# Patient Record
Sex: FEMALE | Race: WHITE | NOT HISPANIC OR LATINO | URBAN - NONMETROPOLITAN AREA
[De-identification: names, ages, dates, MRNs, and addresses within clinical notes are randomized per-mention and may not be internally consistent; named-entity substitution may affect disease eponyms.]

---

## 2017-03-31 ENCOUNTER — APPOINTMENT (OUTPATIENT)
Age: 67
Setting detail: DERMATOLOGY
End: 2017-03-31

## 2017-03-31 DIAGNOSIS — L57.8 OTHER SKIN CHANGES DUE TO CHRONIC EXPOSURE TO NONIONIZING RADIATION: ICD-10-CM

## 2017-03-31 DIAGNOSIS — L40.0 PSORIASIS VULGARIS: ICD-10-CM

## 2017-03-31 DIAGNOSIS — D485 NEOPLASM OF UNCERTAIN BEHAVIOR OF SKIN: ICD-10-CM

## 2017-03-31 PROBLEM — D48.5 NEOPLASM OF UNCERTAIN BEHAVIOR OF SKIN: Status: ACTIVE | Noted: 2017-03-31

## 2017-03-31 PROCEDURE — OTHER TREATMENT REGIMEN: OTHER

## 2017-03-31 PROCEDURE — 11101: CPT

## 2017-03-31 PROCEDURE — 99214 OFFICE O/P EST MOD 30 MIN: CPT | Mod: 25

## 2017-03-31 PROCEDURE — OTHER COUNSELING: OTHER

## 2017-03-31 PROCEDURE — OTHER BIOPSY BY SHAVE METHOD: OTHER

## 2017-03-31 PROCEDURE — OTHER MIPS QUALITY: OTHER

## 2017-03-31 PROCEDURE — 11100: CPT

## 2017-03-31 PROCEDURE — OTHER DEFER: OTHER

## 2017-03-31 ASSESSMENT — LOCATION DETAILED DESCRIPTION DERM
LOCATION DETAILED: RIGHT UPPER CUTANEOUS LIP
LOCATION DETAILED: LEFT MEDIAL DISTAL PRETIBIAL REGION
LOCATION DETAILED: RIGHT CENTRAL FRONTAL SCALP
LOCATION DETAILED: RIGHT MEDIAL TRAPEZIAL NECK
LOCATION DETAILED: UPPER STERNUM
LOCATION DETAILED: LEFT DISTAL POSTERIOR UPPER ARM
LOCATION DETAILED: RIGHT KNEE

## 2017-03-31 ASSESSMENT — LOCATION ZONE DERM
LOCATION ZONE: ARM
LOCATION ZONE: LEG
LOCATION ZONE: TRUNK
LOCATION ZONE: SCALP
LOCATION ZONE: LIP
LOCATION ZONE: NECK

## 2017-03-31 ASSESSMENT — LOCATION SIMPLE DESCRIPTION DERM
LOCATION SIMPLE: LEFT PRETIBIAL REGION
LOCATION SIMPLE: RIGHT KNEE
LOCATION SIMPLE: LEFT POSTERIOR UPPER ARM
LOCATION SIMPLE: CHEST
LOCATION SIMPLE: POSTERIOR NECK
LOCATION SIMPLE: RIGHT SCALP
LOCATION SIMPLE: RIGHT LIP

## 2017-03-31 NOTE — PROCEDURE: MIPS QUALITY
Quality 111:Pneumonia Vaccination Status For Older Adults: Pneumococcal Vaccination not Administered or Previously Received, Reason not Otherwise Specified
Detail Level: Detailed
Quality 110: Preventive Care And Screening: Influenza Immunization: Influenza Immunization Ordered or Recommended, but not Administered
Quality 47: Advance Care Plan: Advance Care Planning discussed and documented; advance care plan or surrogate decision maker documented in the medical record.

## 2017-03-31 NOTE — PROCEDURE: TREATMENT REGIMEN
Action 1: Continue
Initiate Regimen: Kit-psor 1 and Kit- eczema from [facility]
Detail Level: Zone

## 2017-03-31 NOTE — PROCEDURE: BIOPSY BY SHAVE METHOD
Cryotherapy Text: The wound bed was treated with cryotherapy after the biopsy was performed.
Wound Care: Bacitracin
Post-Care Instructions: I reviewed with the patient in detail post-care instructions. Patient is to keep the biopsy site dry overnight, and then apply bacitracin twice daily until healed. Patient may apply hydrogen peroxide soaks to remove any crusting.
Silver Nitrate Text: The wound bed was treated with silver nitrate after the biopsy was performed.
Electrodesiccation Text: The wound bed was treated with electrodesiccation after the biopsy was performed.
Anesthesia Volume In Cc: 0.5
Consent: Written consent was obtained and risks were reviewed including but not limited to scarring, infection, bleeding, scabbing, incomplete removal, nerve damage and allergy to anesthesia.
Hemostasis: Drysol
Anesthesia Type: 1% lidocaine with epinephrine
Additional Anesthesia Volume In Cc (Will Not Render If 0): 0
Billing Type: Third-Party Bill
Biopsy Method: Double edge Personna blades
Bill For Surgical Tray: no
Dressing: bandage
Electrodesiccation And Curettage Text: The wound bed was treated with electrodesiccation and curettage after the biopsy was performed.
Detail Level: Detailed
Type Of Destruction Used: Curettage
Notification Instructions: Patient will be notified of biopsy results. However, patient instructed to call the office if not contacted within 2 weeks.
Biopsy Type: H and E
Curettage Text: The wound bed was treated with curettage after the biopsy was performed.

## 2017-04-28 ENCOUNTER — APPOINTMENT (OUTPATIENT)
Age: 67
Setting detail: DERMATOLOGY
End: 2017-04-28

## 2017-04-28 DIAGNOSIS — L57.0 ACTINIC KERATOSIS: ICD-10-CM

## 2017-04-28 DIAGNOSIS — D485 NEOPLASM OF UNCERTAIN BEHAVIOR OF SKIN: ICD-10-CM

## 2017-04-28 DIAGNOSIS — L57.8 OTHER SKIN CHANGES DUE TO CHRONIC EXPOSURE TO NONIONIZING RADIATION: ICD-10-CM

## 2017-04-28 PROBLEM — E78.5 HYPERLIPIDEMIA, UNSPECIFIED: Status: ACTIVE | Noted: 2017-04-28

## 2017-04-28 PROBLEM — D48.5 NEOPLASM OF UNCERTAIN BEHAVIOR OF SKIN: Status: ACTIVE | Noted: 2017-04-28

## 2017-04-28 PROCEDURE — 99213 OFFICE O/P EST LOW 20 MIN: CPT | Mod: 25

## 2017-04-28 PROCEDURE — 17000 DESTRUCT PREMALG LESION: CPT

## 2017-04-28 PROCEDURE — OTHER RECOMMENDATIONS: OTHER

## 2017-04-28 PROCEDURE — OTHER LIQUID NITROGEN: OTHER

## 2017-04-28 PROCEDURE — OTHER FOLLOW UP FOR NEXT VISIT: OTHER

## 2017-04-28 PROCEDURE — OTHER BIOPSY BY SHAVE METHOD: OTHER

## 2017-04-28 PROCEDURE — 11100: CPT | Mod: 59

## 2017-04-28 PROCEDURE — OTHER COUNSELING: OTHER

## 2017-04-28 PROCEDURE — 17003 DESTRUCT PREMALG LES 2-14: CPT

## 2017-04-28 ASSESSMENT — LOCATION SIMPLE DESCRIPTION DERM
LOCATION SIMPLE: CHEST
LOCATION SIMPLE: LEFT PRETIBIAL REGION
LOCATION SIMPLE: RIGHT LIP

## 2017-04-28 ASSESSMENT — LOCATION DETAILED DESCRIPTION DERM
LOCATION DETAILED: UPPER STERNUM
LOCATION DETAILED: RIGHT UPPER CUTANEOUS LIP
LOCATION DETAILED: LEFT DISTAL PRETIBIAL REGION

## 2017-04-28 ASSESSMENT — LOCATION ZONE DERM
LOCATION ZONE: TRUNK
LOCATION ZONE: LIP
LOCATION ZONE: LEG

## 2017-04-28 NOTE — PROCEDURE: LIQUID NITROGEN
Render Post-Care Instructions In Note?: no
Detail Level: Simple
Post-Care Instructions: I reviewed with the patient in detail post-care instructions. Patient is to wear sunprotection, and avoid picking at any of the treated lesions. Pt may apply Vaseline to crusted or scabbing areas.
Consent: The patient's consent was obtained including but not limited to risks of crusting, scabbing, blistering, scarring, darker or lighter pigmentary change, recurrence, incomplete removal and infection.
Number Of Freeze-Thaw Cycles: 2 freeze-thaw cycles
Duration Of Freeze Thaw-Cycle (Seconds): 2

## 2017-04-28 NOTE — PROCEDURE: RECOMMENDATIONS
Detail Level: Simple
Recommendation Preamble: The following recommendations were made during the visit:

## 2017-04-28 NOTE — PROCEDURE: BIOPSY BY SHAVE METHOD
Additional Anesthesia Volume In Cc (Will Not Render If 0): 0
Dressing: bandage
Detail Level: Detailed
Electrodesiccation Text: The wound bed was treated with electrodesiccation after the biopsy was performed.
Curettage Text: The wound bed was treated with curettage after the biopsy was performed.
Silver Nitrate Text: The wound bed was treated with silver nitrate after the biopsy was performed.
Type Of Destruction Used: Curettage
Hemostasis: Drysol
Destruction After The Procedure: No
Notification Instructions: Patient will be notified of biopsy results. However, patient instructed to call the office if not contacted within 2 weeks.
Consent: Written consent was obtained and risks were reviewed including but not limited to scarring, infection, bleeding, scabbing, incomplete removal, nerve damage and allergy to anesthesia.
Biopsy Method: Double edge Personna blades
Post-Care Instructions: I reviewed with the patient in detail post-care instructions. Patient is to keep the biopsy site dry overnight, and then apply bacitracin twice daily until healed. Patient may apply hydrogen peroxide soaks to remove any crusting.
Anesthesia Volume In Cc: 0.5
Anesthesia Type: 1% lidocaine with epinephrine
Electrodesiccation And Curettage Text: The wound bed was treated with electrodesiccation and curettage after the biopsy was performed.
Biopsy Type: H and E
Wound Care: Bacitracin
Billing Type: Third-Party Bill
Cryotherapy Text: The wound bed was treated with cryotherapy after the biopsy was performed.

## 2017-05-31 ENCOUNTER — APPOINTMENT (OUTPATIENT)
Age: 67
Setting detail: DERMATOLOGY
End: 2017-06-01

## 2017-05-31 PROBLEM — C44.92 SQUAMOUS CELL CARCINOMA OF SKIN, UNSPECIFIED: Status: ACTIVE | Noted: 2017-05-31

## 2017-05-31 PROBLEM — C44.729 SQUAMOUS CELL CARCINOMA OF SKIN OF LEFT LOWER LIMB, INCLUDING HIP: Status: ACTIVE | Noted: 2017-05-31

## 2017-05-31 PROCEDURE — OTHER SUPERFICIAL RADIATION TREATMENT: OTHER

## 2017-05-31 PROCEDURE — OTHER TREATMENT REGIMEN: OTHER

## 2017-05-31 PROCEDURE — 77334 RADIATION TREATMENT AID(S): CPT

## 2017-05-31 PROCEDURE — 77280 THER RAD SIMULAJ FIELD SMPL: CPT

## 2017-05-31 PROCEDURE — 99211 OFF/OP EST MAY X REQ PHY/QHP: CPT | Mod: 25

## 2017-05-31 PROCEDURE — OTHER COUNSELING: OTHER

## 2017-05-31 PROCEDURE — OTHER FOLLOW UP FOR NEXT VISIT: OTHER

## 2017-05-31 PROCEDURE — 77300 RADIATION THERAPY DOSE PLAN: CPT

## 2017-05-31 PROCEDURE — 77261 THER RADIOLOGY TX PLNG SMPL: CPT

## 2017-05-31 NOTE — PROCEDURE: SUPERFICIAL RADIATION TREATMENT
Include Rx 4 When Rendering Additional Prescriptions: No
Total Dose (Optional-Please Include Units): 5062.2 cGy
Simple Simulation Afterword Text Will Be Included With Simple Simulations (Indications............): The patient had a complete consultation regarding all applicable modalities for the treatment of their skin cancer and based on a variety of factors including the type of tumor, size, and location, the relevant medical history as well as local tissue factors, the functional status of the individual, the ability to perform necessary postoperative wound instructions and the need for simultaneous treatments as well as overall wound healing status, it was determined that the patient would begin radiation therapy treatment for skin cancer.  A full simulation and treatment device design was performed including the determination and formulation of appropriate simple and complex devices including lead shield of 0.762 mm thickness to form molded customized shielding to specifically correlate with the lesion size including treatment margin.  The custom lead shield is adequate to accommodate the appropriate applicator and provide adequate shielding around the treatment site.  The specific field applicator, shields, and devices both simple and complex as well as the specific patient setup is outlined below.  The patient was given a full consent for superficial radiation to both verbally and in writing and the full determination of patient's eligibility for treatment and selection is outlined on the patient eligibility and treatment selection form.  The specific superficial radiotherapy prescription was determined and was documented on the superficial radiotherapy prescription form.  A treatment calculation was also performed and documented on the treatment calculation form.  Based on the prescription, the patient was scheduled for a series of fractional treatments.
Port Dimensions-X Axis In Cm: 2
Field Size (Applicator): 2.5 cm
Total Number Of Fractions Rx 3: 15
Fractionation Number (Evaluation): 5
Field Size (Applicator) Rx 2: 4.0 cm
Treatment Margins In Cm: 0.5
Custom Shielding Preamble Text Will Not Be Included With Simple Simulations (.......... X X Y Cm): A lead shield of 0.762 mm thickness is utilized to form a molded, custom shield with a
Field Number: 1
Additional Prescription Justification Text: If there is any interruption in treatment exceeding 5 days please see Decay and Dose Adjustment Calculation and complete treatment under Prescription 2.
Simple Simulation Preamble Text Will Be Included With Simple Simulations (.......... Indications): Simple simulation was performed today for the following reasons:
Energy (Include Units): 50kV
Fractions / Week: 3
Dose / Tx In Cgy (Optional): 383.5
Treatment Time / Fractionation (Optional- Include Units): 0.33 min
Assessment: Appropriate reaction
Total Number Of Fractions: 20
Fractionation Number: 0
Daily Fractionated Dose (Optional- Include Units): 253.11 cGy
Treatment Time In Min (Optional): 0.50
Computed Treatment Time In Min (Will Render The Same As Calculated Treatment Time If Left Blank): 0.33
Treatment Device Design After Initial Simulation Justification (Will Render If Bill For Treatment Devices = Yes): The patient is status post radiation simulation and is evaluated as to the use of additional devices for shielding and placement for radiation therapy.
Detail Level: Detailed
Render Prescriptions In Note?: Yes
Custom Shielding Afterword Text Will Not Be Included With Simple Simulations (X X Y Cm............): port to correlate with the lesion size, including treatment margin. The custom lead shield is adequate to accommodate the appropriate applicator and provide adequate shielding around the treatment site. Additional shielding (as noted below) is used to protect sensitive, normal tissues.
Functional Status: 1 (ambulatory, light activity)
Time Dose Fractionation (Optional- Include Units If Applicable): 86
Intro Statement (Will Not Render If Left Blank): The patient is undergoing superficial radiation therapy for skin cancer and presents for weekly evaluation and management.  Per protocol and as documented on the flow sheet, the patient was questioned as to subjective redness, pruritus, pain, drainage, fatigue, or any other symptoms.  Objectively, the radiation area was evaluated with regards to erythema, atrophy, scale, crusting, erosion, ulceration, edema, purpura, tenderness, warmth, drainage, and any other findings.  The plan was extensively reviewed including the dose, and dosing schedule.  The simulation and clinical setup was also reviewed as was the external and any internal shields and based on this review the appropriateness and sufficiency of treatment was determined.
Shielding Size (Optional- Include Units) Rx 2: 3.0 x 2.5 cm
Shielding Size (Optional- Include Units): 2.0 x 2.0 cm
Energy (Optional-Please Include Units) Rx 2: 50 kV
Dose Per Fractionation In Cgy (Optional): 253.11
Patient Positioning: Sitting

## 2017-05-31 NOTE — PROCEDURE: TREATMENT REGIMEN
Plan: Per the request of Blaire Camejo Sherry was seen today for Superficial Radiation Therapy requiring simulation (CPT® 62127) in preparation for treatment of specific diseased site(s). Simulation is necessary to determine correct patient and treatment portal positioning, deliver safe and effective radiation therapy.
Detail Level: Zone

## 2017-07-05 ENCOUNTER — APPOINTMENT (OUTPATIENT)
Age: 67
Setting detail: DERMATOLOGY
End: 2017-07-05

## 2017-07-05 PROBLEM — C44.729 SQUAMOUS CELL CARCINOMA OF SKIN OF LEFT LOWER LIMB, INCLUDING HIP: Status: ACTIVE | Noted: 2017-07-05

## 2017-07-05 PROCEDURE — 77280 THER RAD SIMULAJ FIELD SMPL: CPT

## 2017-07-05 PROCEDURE — OTHER SUPERFICIAL RADIATION TREATMENT: OTHER

## 2017-07-05 PROCEDURE — OTHER FOLLOW UP FOR NEXT VISIT: OTHER

## 2017-07-05 PROCEDURE — OTHER TREATMENT REGIMEN: OTHER

## 2017-07-05 PROCEDURE — G6001 ECHO GUIDANCE RADIOTHERAPY: HCPCS

## 2017-07-05 PROCEDURE — 77401 RADIATION TX DELIVERY SUPFC: CPT

## 2017-07-05 NOTE — PROCEDURE: TREATMENT REGIMEN
Detail Level: Zone
Plan: Per the request of Blaire Camejo Irais was seen today for Superficial Radiation Therapy requiring simulation (CPT® 63664) in preparation for treatment of specific diseased site(s). Simulation is necessary to determine correct patient and treatment portal positioning, deliver safe and effective radiation therapy. A high frequency ultrasound image was acquired prior to treatment today for three dimensional evaluation of tumor volume and response to treatment, in addition, geometric accuracy of field placement (CPT® ). Physician evaluation of the ultrasound tumor depth will be ongoing through course of treatment, and is deemed medically necessary ensuring efficacy of treatment. Today’s image and setup was evaluated determining continuation of treatment with the current plan, or necessary changes as appropriate. All appropriate custom blocking and treatment parameters verified by radiation therapist according to initial simulation.\\n\\nUS image guidance and field placement prior to treatment delivery performed. US depth= 0.7 mm\\n\\nPer Dr. Reid, continued daily US guidance and simulation is required for field placement, measurement of tumor depth, progress and edema monitoring

## 2017-07-05 NOTE — PROCEDURE: SUPERFICIAL RADIATION TREATMENT
Total Number Of Fractions Rx 4: 15
Vinh For Simulation Without Treatment Device Design (Simple Simulation): No
Field Size (Applicator) Rx 2: 4.0 cm
Fractionation Number (Evaluation): 5
Total Number Of Fractions: 20
Additional Prescription Justification Text: If there is any interruption in treatment exceeding 5 days please see Decay and Dose Adjustment Calculation and complete treatment under Prescription 2.
Dose Per Fractionation In Cgy (Optional): 253.11
Functional Status: 1 (ambulatory, light activity)
Prescription Used: 1
Treatment Device Design After Initial Simulation Justification (Will Render If Bill For Treatment Devices = Yes): The patient is status post radiation simulation and is evaluated as to the use of additional devices for shielding and placement for radiation therapy.
Shielding Size (Optional- Include Units) Rx 2: 3.0 x 2.5 cm
Energy (Include Units): 50kV
Detail Level: Detailed
Total Dose (Optional-Please Include Units): 5062.2 cGy
Initial Radiation Treatment Planning (Will Render If Bill Simulation = Yes): The patient had a complete consultation regarding all applicable modalities for the treatment of their skin cancer and based on a variety of factors including the type of tumor, size, and location, the relevant medical history as well as local tissue factors, the functional status of the individual, the ability to perform necessary postoperative wound instructions and the need for simultaneous treatments as well as overall wound healing status, it was determined that the patient would begin radiation therapy treatment for skin cancer.  A full simulation and treatment device design was performed including the determination and formulation of appropriate simple and complex devices including lead shield of 0.762 mm thickness to form molded customized shielding to specifically correlate with the lesion size including treatment margin.  The custom lead shield is adequate to accommodate the appropriate applicator and provide adequate shielding around the treatment site.  The specific field applicator, shields, and devices both simple and complex as well as the specific patient setup is outlined below.  The patient was given a full consent for superficial radiation to both verbally and in writing and the full determination of patient's eligibility for treatment and selection is outlined on the patient eligibility and treatment selection form.  The specific superficial radiotherapy prescription was determined and was documented on the superficial radiotherapy prescription form.  A treatment calculation was also performed and documented on the treatment calculation form.  Based on the prescription, the patient was scheduled for a series of fractional treatments.
Computed Treatment Time In Min (Will Render The Same As Calculated Treatment Time If Left Blank): 0.33
Field Size (Applicator): 2.5 cm
Fractionation Number: 0
Treatment Time / Fractionation (Optional- Include Units): 0.33 min
Energy (Optional-Please Include Units) Rx 2: 50 kV
Port Dimensions-Y Axis In Cm: 2
Patient Positioning: Sitting
Intro Statement (Will Not Render If Left Blank): The patient is undergoing superficial radiation therapy for skin cancer and presents for weekly evaluation and management.  Per protocol and as documented on the flow sheet, the patient was questioned as to subjective redness, pruritus, pain, drainage, fatigue, or any other symptoms.  Objectively, the radiation area was evaluated with regards to erythema, atrophy, scale, crusting, erosion, ulceration, edema, purpura, tenderness, warmth, drainage, and any other findings.  The plan was extensively reviewed including the dose, and dosing schedule.  The simulation and clinical setup was also reviewed as was the external and any internal shields and based on this review the appropriateness and sufficiency of treatment was determined.
Custom Shielding Afterword Text Will Not Be Included With Simple Simulations (X X Y Cm............): port to correlate with the lesion size, including treatment margin. The custom lead shield is adequate to accommodate the appropriate applicator and provide adequate shielding around the treatment site. Additional shielding (as noted below) is used to protect sensitive, normal tissues.
Assessment: Appropriate reaction
Day Of The Week Treatment Administered: Wednesday
Time Dose Fractionation (Optional- Include Units If Applicable): 86
Bill For Radiation Treatment: Yes
Treatment Margins In Cm: 0.5
Daily Fractionated Dose (Optional- Include Units): 253.11 cGy
Custom Shielding Preamble Text Will Not Be Included With Simple Simulations (.......... X X Y Cm): A lead shield of 0.762 mm thickness is utilized to form a molded, custom shield with a
Fractions / Week Rx 2: 3
Shielding Size (Optional- Include Units): 2.0 x 2.0 cm
Treatment Time In Min (Optional): 0.50
Simple Simulation Preamble Text Will Be Included With Simple Simulations (.......... Indications): Simple simulation was performed today for the following reasons:
Dose / Tx In Cgy (Optional): 383.5

## 2017-07-07 ENCOUNTER — APPOINTMENT (OUTPATIENT)
Age: 67
Setting detail: DERMATOLOGY
End: 2017-07-10

## 2017-07-07 PROBLEM — E78.5 HYPERLIPIDEMIA, UNSPECIFIED: Status: ACTIVE | Noted: 2017-07-07

## 2017-07-07 PROBLEM — C44.729 SQUAMOUS CELL CARCINOMA OF SKIN OF LEFT LOWER LIMB, INCLUDING HIP: Status: ACTIVE | Noted: 2017-07-07

## 2017-07-07 PROCEDURE — OTHER TREATMENT REGIMEN: OTHER

## 2017-07-07 PROCEDURE — OTHER SUPERFICIAL RADIATION TREATMENT: OTHER

## 2017-07-07 PROCEDURE — 77280 THER RAD SIMULAJ FIELD SMPL: CPT

## 2017-07-07 PROCEDURE — 77401 RADIATION TX DELIVERY SUPFC: CPT

## 2017-07-07 PROCEDURE — G6001 ECHO GUIDANCE RADIOTHERAPY: HCPCS

## 2017-07-07 PROCEDURE — OTHER FOLLOW UP FOR NEXT VISIT: OTHER

## 2017-07-07 NOTE — PROCEDURE: TREATMENT REGIMEN
Plan: Per the request of Blaire Camejo Sherry was seen today for Superficial Radiation Therapy requiring simulation (CPT® 66760) in preparation for treatment of specific diseased site(s). Simulation is necessary to determine correct patient and treatment portal positioning, deliver safe and effective radiation therapy. A high frequency ultrasound image was acquired prior to treatment today for three dimensional evaluation of tumor volume and response to treatment, in addition, geometric accuracy of field placement (CPT® ). Physician evaluation of the ultrasound tumor depth will be ongoing through course of treatment, and is deemed medically necessary ensuring efficacy of treatment. Today’s image and setup was evaluated determining continuation of treatment with the current plan, or necessary changes as appropriate. All appropriate custom blocking and treatment parameters verified by radiation therapist according to initial simulation.\\n\\nUS image guidance and field placement prior to treatment delivery performed. US depth= 0.54\\n\\nPer Dr. Reid, continued daily US guidance and simulation is required for field placement, measurement of tumor depth, progress and edema monitoring
Detail Level: Zone

## 2017-07-07 NOTE — PROCEDURE: SUPERFICIAL RADIATION TREATMENT
Intro Statement (Will Not Render If Left Blank): The patient is undergoing superficial radiation therapy for skin cancer and presents for weekly evaluation and management.  Per protocol and as documented on the flow sheet, the patient was questioned as to subjective redness, pruritus, pain, drainage, fatigue, or any other symptoms.  Objectively, the radiation area was evaluated with regards to erythema, atrophy, scale, crusting, erosion, ulceration, edema, purpura, tenderness, warmth, drainage, and any other findings.  The plan was extensively reviewed including the dose, and dosing schedule.  The simulation and clinical setup was also reviewed as was the external and any internal shields and based on this review the appropriateness and sufficiency of treatment was determined.
Functional Status: 1 (ambulatory, light activity)
Render Patient Eligibility And Selection In Note?: No
Bill For Radiation Treatment: Yes
Fractions / Week: 3
Detail Level: Detailed
Fractionation Number (Evaluation): 5
Cumulative Dose In Cgy (Optional): 506.22
Shielding Size (Optional- Include Units): 2.0 x 2.0 cm
Treatment Device Design After Initial Simulation Justification (Will Render If Bill For Treatment Devices = Yes): The patient is status post radiation simulation and is evaluated as to the use of additional devices for shielding and placement for radiation therapy.
Custom Shielding Afterword Text Will Not Be Included With Simple Simulations (X X Y Cm............): port to correlate with the lesion size, including treatment margin. The custom lead shield is adequate to accommodate the appropriate applicator and provide adequate shielding around the treatment site. Additional shielding (as noted below) is used to protect sensitive, normal tissues.
Field Size (Applicator): 2.5 cm
Port Dimensions-X Axis In Cm: 2
Additional Prescription Justification Text: If there is any interruption in treatment exceeding 5 days please see Decay and Dose Adjustment Calculation and complete treatment under Prescription 2.
Dimensions-X Axis In Cm: 1
Total Dose (Optional-Please Include Units): 5062.2 cGy
Dose Per Fractionation In Cgy (Optional): 253.11
Shielding Size (Optional- Include Units) Rx 2: 3.0 x 2.5 cm
Custom Shielding Preamble Text Will Not Be Included With Simple Simulations (.......... X X Y Cm): A lead shield of 0.762 mm thickness is utilized to form a molded, custom shield with a
Assessment: Appropriate reaction
Time Dose Fractionation (Optional- Include Units If Applicable): 86
Energy (Include Units): 50kV
Patient Positioning: Sitting
Total Number Of Fractions Rx 3: 15
Dose / Tx In Cgy (Optional): 383.5
Simple Simulation Preamble Text Will Be Included With Simple Simulations (.......... Indications): Simple simulation was performed today for the following reasons:
Treatment Time In Min (Optional): 0.50
Field Size (Applicator) Rx 2: 4.0 cm
Energy (Optional-Please Include Units) Rx 2: 50 kV
Total Number Of Fractions: 20
Daily Fractionated Dose (Optional- Include Units): 253.11 cGy
Initial Radiation Treatment Planning (Will Render If Bill Simulation = Yes): The patient had a complete consultation regarding all applicable modalities for the treatment of their skin cancer and based on a variety of factors including the type of tumor, size, and location, the relevant medical history as well as local tissue factors, the functional status of the individual, the ability to perform necessary postoperative wound instructions and the need for simultaneous treatments as well as overall wound healing status, it was determined that the patient would begin radiation therapy treatment for skin cancer.  A full simulation and treatment device design was performed including the determination and formulation of appropriate simple and complex devices including lead shield of 0.762 mm thickness to form molded customized shielding to specifically correlate with the lesion size including treatment margin.  The custom lead shield is adequate to accommodate the appropriate applicator and provide adequate shielding around the treatment site.  The specific field applicator, shields, and devices both simple and complex as well as the specific patient setup is outlined below.  The patient was given a full consent for superficial radiation to both verbally and in writing and the full determination of patient's eligibility for treatment and selection is outlined on the patient eligibility and treatment selection form.  The specific superficial radiotherapy prescription was determined and was documented on the superficial radiotherapy prescription form.  A treatment calculation was also performed and documented on the treatment calculation form.  Based on the prescription, the patient was scheduled for a series of fractional treatments.
Computed Treatment Time In Min (Will Render The Same As Calculated Treatment Time If Left Blank): 0.33
Treatment Time / Fractionation (Optional- Include Units): 0.33 min
Treatment Margins In Cm: 0.5
Day Of The Week Treatment Administered: Friday
Fractionation Number: 0

## 2017-07-10 ENCOUNTER — APPOINTMENT (OUTPATIENT)
Age: 67
Setting detail: DERMATOLOGY
End: 2017-07-10

## 2017-07-10 PROBLEM — C44.729 SQUAMOUS CELL CARCINOMA OF SKIN OF LEFT LOWER LIMB, INCLUDING HIP: Status: ACTIVE | Noted: 2017-07-10

## 2017-07-10 PROCEDURE — 77280 THER RAD SIMULAJ FIELD SMPL: CPT

## 2017-07-10 PROCEDURE — OTHER SUPERFICIAL RADIATION TREATMENT: OTHER

## 2017-07-10 PROCEDURE — OTHER TREATMENT REGIMEN: OTHER

## 2017-07-10 PROCEDURE — 77401 RADIATION TX DELIVERY SUPFC: CPT

## 2017-07-10 PROCEDURE — OTHER FOLLOW UP FOR NEXT VISIT: OTHER

## 2017-07-10 PROCEDURE — G6001 ECHO GUIDANCE RADIOTHERAPY: HCPCS

## 2017-07-10 NOTE — PROCEDURE: TREATMENT REGIMEN
Detail Level: Zone
Plan: Per the request of Dr. Reid, Blaire Irais was seen today for Superficial Radiation Therapy requiring simulation (CPT® 45794) in preparation for treatment of specific diseased site(s). Simulation is necessary to determine correct patient and treatment portal positioning, deliver safe and effective radiation therapy. A high frequency ultrasound image was acquired prior to treatment today for three dimensional evaluation of tumor volume and response to treatment, in addition, geometric accuracy of field placement (CPT® ). Physician evaluation of the ultrasound tumor depth will be ongoing through course of treatment, and is deemed medically necessary ensuring efficacy of treatment. Today’s image and setup was evaluated determining continuation of treatment with the current plan, or necessary changes as appropriate. All appropriate custom blocking and treatment parameters verified by radiation therapist according to initial simulation.\\n\\nUS image guidance and field placement prior to treatment delivery performed. US depth is 0.67mm.\\n\\nPer Dr. Reid, continued daily US guidance and simulation is required for field placement, measurement of tumor depth, progress and edema monitoring

## 2017-07-10 NOTE — PROCEDURE: SUPERFICIAL RADIATION TREATMENT
Port Dimensions-Y Axis In Cm: 2
Computed Treatment Time In Min (Will Render The Same As Calculated Treatment Time If Left Blank): 0.33
Field Size (Applicator): 2.5 cm
Prescription Used: 1
Render Patient Eligibility And Selection In Note?: No
Day Of The Week Treatment Administered: Monday
Time Dose Fractionation (Optional- Include Units If Applicable): 86
Patient Positioning: Sitting
Total Number Of Fractions Rx 4: 15
Energy (Optional-Please Include Units): 50kV
Custom Shielding Afterword Text Will Not Be Included With Simple Simulations (X X Y Cm............): port to correlate with the lesion size, including treatment margin. The custom lead shield is adequate to accommodate the appropriate applicator and provide adequate shielding around the treatment site. Additional shielding (as noted below) is used to protect sensitive, normal tissues.
Field Size (Applicator) Rx 2: 4.0 cm
Simple Simulation Preamble Text Will Be Included With Simple Simulations (.......... Indications): Simple simulation was performed today for the following reasons:
Shielding Size (Optional- Include Units): 2.0 x 2.0 cm
Fractionation Number (Evaluation): 5
Energy (Optional-Please Include Units) Rx 2: 50 kV
Treatment Time In Min (Optional): 0.50
Custom Shielding Preamble Text Will Not Be Included With Simple Simulations (.......... X X Y Cm): A lead shield of 0.762 mm thickness is utilized to form a molded, custom shield with a
Intro Statement (Will Not Render If Left Blank): The patient is undergoing superficial radiation therapy for skin cancer and presents for weekly evaluation and management.  Per protocol and as documented on the flow sheet, the patient was questioned as to subjective redness, pruritus, pain, drainage, fatigue, or any other symptoms.  Objectively, the radiation area was evaluated with regards to erythema, atrophy, scale, crusting, erosion, ulceration, edema, purpura, tenderness, warmth, drainage, and any other findings.  The plan was extensively reviewed including the dose, and dosing schedule.  The simulation and clinical setup was also reviewed as was the external and any internal shields and based on this review the appropriateness and sufficiency of treatment was determined.
Treatment Device Design After Initial Simulation Justification (Will Render If Bill For Treatment Devices = Yes): The patient is status post radiation simulation and is evaluated as to the use of additional devices for shielding and placement for radiation therapy.
Total Number Of Fractions: 20
Total Dose (Optional-Please Include Units): 5062.2 cGy
Initial Radiation Treatment Planning (Will Render If Bill Simulation = Yes): The patient had a complete consultation regarding all applicable modalities for the treatment of their skin cancer and based on a variety of factors including the type of tumor, size, and location, the relevant medical history as well as local tissue factors, the functional status of the individual, the ability to perform necessary postoperative wound instructions and the need for simultaneous treatments as well as overall wound healing status, it was determined that the patient would begin radiation therapy treatment for skin cancer.  A full simulation and treatment device design was performed including the determination and formulation of appropriate simple and complex devices including lead shield of 0.762 mm thickness to form molded customized shielding to specifically correlate with the lesion size including treatment margin.  The custom lead shield is adequate to accommodate the appropriate applicator and provide adequate shielding around the treatment site.  The specific field applicator, shields, and devices both simple and complex as well as the specific patient setup is outlined below.  The patient was given a full consent for superficial radiation to both verbally and in writing and the full determination of patient's eligibility for treatment and selection is outlined on the patient eligibility and treatment selection form.  The specific superficial radiotherapy prescription was determined and was documented on the superficial radiotherapy prescription form.  A treatment calculation was also performed and documented on the treatment calculation form.  Based on the prescription, the patient was scheduled for a series of fractional treatments.
Fractions / Week: 3
Treatment Margins In Cm: 0.5
Detail Level: Detailed
Functional Status: 1 (ambulatory, light activity)
Bill For Radiation Treatment: Yes
Daily Fractionated Dose (Optional- Include Units): 253.11 cGy
Assessment: Appropriate reaction
Dose Per Fractionation In Cgy (Optional): 253.11
Cumulative Dose In Cgy (Optional): 759.33
Shielding Size (Optional- Include Units) Rx 2: 3.0 x 2.5 cm
Additional Prescription Justification Text: If there is any interruption in treatment exceeding 5 days please see Decay and Dose Adjustment Calculation and complete treatment under Prescription 2.
Treatment Time / Fractionation (Optional- Include Units): 0.33 min
Dose / Tx In Cgy (Optional): 383.5
Fractionation Number: 0

## 2017-07-12 ENCOUNTER — APPOINTMENT (OUTPATIENT)
Age: 67
Setting detail: DERMATOLOGY
End: 2017-07-12

## 2017-07-12 PROBLEM — C44.729 SQUAMOUS CELL CARCINOMA OF SKIN OF LEFT LOWER LIMB, INCLUDING HIP: Status: ACTIVE | Noted: 2017-07-12

## 2017-07-12 PROCEDURE — 77401 RADIATION TX DELIVERY SUPFC: CPT

## 2017-07-12 PROCEDURE — 77280 THER RAD SIMULAJ FIELD SMPL: CPT

## 2017-07-12 PROCEDURE — OTHER TREATMENT REGIMEN: OTHER

## 2017-07-12 PROCEDURE — OTHER SUPERFICIAL RADIATION TREATMENT: OTHER

## 2017-07-12 PROCEDURE — G6001 ECHO GUIDANCE RADIOTHERAPY: HCPCS

## 2017-07-12 PROCEDURE — OTHER FOLLOW UP FOR NEXT VISIT: OTHER

## 2017-07-12 NOTE — PROCEDURE: TREATMENT REGIMEN
Detail Level: Zone
Plan: Per the request of Dr. Reid, Blaire Irais was seen today for Superficial Radiation Therapy requiring simulation (CPT® 19310) in preparation for treatment of specific diseased site(s). Simulation is necessary to determine correct patient and treatment portal positioning, deliver safe and effective radiation therapy. A high frequency ultrasound image was acquired prior to treatment today for three dimensional evaluation of tumor volume and response to treatment, in addition, geometric accuracy of field placement (CPT® ). Physician evaluation of the ultrasound tumor depth will be ongoing through course of treatment, and is deemed medically necessary ensuring efficacy of treatment. Today’s image and setup was evaluated determining continuation of treatment with the current plan, or necessary changes as appropriate. All appropriate custom blocking and treatment parameters verified by radiation therapist according to initial simulation.\\n\\nUS image guidance and field placement prior to treatment delivery performed. US depth is 0.54mm.\\n\\nPer Dr. Reid, continued daily US guidance and simulation is required for field placement, measurement of tumor depth, progress and edema monitoring

## 2017-07-12 NOTE — PROCEDURE: SUPERFICIAL RADIATION TREATMENT
Patient Positioning: Sitting
Cumulative Dose In Cgy (Optional): 1012.44
Intro Statement (Will Not Render If Left Blank): The patient is undergoing superficial radiation therapy for skin cancer and presents for weekly evaluation and management.  Per protocol and as documented on the flow sheet, the patient was questioned as to subjective redness, pruritus, pain, drainage, fatigue, or any other symptoms.  Objectively, the radiation area was evaluated with regards to erythema, atrophy, scale, crusting, erosion, ulceration, edema, purpura, tenderness, warmth, drainage, and any other findings.  The plan was extensively reviewed including the dose, and dosing schedule.  The simulation and clinical setup was also reviewed as was the external and any internal shields and based on this review the appropriateness and sufficiency of treatment was determined.
Field Size (Applicator): 2.5 cm
Render Patient Eligibility And Selection In Note?: No
Field Number: 1
Treatment Device Design After Initial Simulation Justification (Will Render If Bill For Treatment Devices = Yes): The patient is status post radiation simulation and is evaluated as to the use of additional devices for shielding and placement for radiation therapy.
Custom Shielding Afterword Text Will Not Be Included With Simple Simulations (X X Y Cm............): port to correlate with the lesion size, including treatment margin. The custom lead shield is adequate to accommodate the appropriate applicator and provide adequate shielding around the treatment site. Additional shielding (as noted below) is used to protect sensitive, normal tissues.
Fractionation Number: 4
Bill For Radiation Treatment: Yes
Computed Treatment Time In Min (Will Render The Same As Calculated Treatment Time If Left Blank): 0.33
Total Number Of Fractions: 20
Custom Shielding Preamble Text Will Not Be Included With Simple Simulations (.......... X X Y Cm): A lead shield of 0.762 mm thickness is utilized to form a molded, custom shield with a
Field Size (Applicator) Rx 2: 4.0 cm
Treatment Time / Fractionation (Optional- Include Units): 0.33 min
Total Number Of Fractions Rx 3: 15
Assessment: Appropriate reaction
Dose Per Fractionation In Cgy (Optional): 253.11
Total Dose (Optional-Please Include Units): 5062.2 cGy
Fractions / Week Rx 3: 5
Port Dimensions-Y Axis In Cm: 2
Functional Status: 1 (ambulatory, light activity)
Shielding Size (Optional- Include Units) Rx 2: 3.0 x 2.5 cm
Fractionation Number: 0
Daily Fractionated Dose (Optional- Include Units): 253.11 cGy
Fractions / Week Rx 2: 3
Energy (Optional-Please Include Units): 50kV
Dose / Tx In Cgy (Optional): 383.5
Shielding Size (Optional- Include Units): 2.0 x 2.0 cm
Initial Radiation Treatment Planning (Will Render If Bill Simulation = Yes): The patient had a complete consultation regarding all applicable modalities for the treatment of their skin cancer and based on a variety of factors including the type of tumor, size, and location, the relevant medical history as well as local tissue factors, the functional status of the individual, the ability to perform necessary postoperative wound instructions and the need for simultaneous treatments as well as overall wound healing status, it was determined that the patient would begin radiation therapy treatment for skin cancer.  A full simulation and treatment device design was performed including the determination and formulation of appropriate simple and complex devices including lead shield of 0.762 mm thickness to form molded customized shielding to specifically correlate with the lesion size including treatment margin.  The custom lead shield is adequate to accommodate the appropriate applicator and provide adequate shielding around the treatment site.  The specific field applicator, shields, and devices both simple and complex as well as the specific patient setup is outlined below.  The patient was given a full consent for superficial radiation to both verbally and in writing and the full determination of patient's eligibility for treatment and selection is outlined on the patient eligibility and treatment selection form.  The specific superficial radiotherapy prescription was determined and was documented on the superficial radiotherapy prescription form.  A treatment calculation was also performed and documented on the treatment calculation form.  Based on the prescription, the patient was scheduled for a series of fractional treatments.
Detail Level: Detailed
Treatment Margins In Cm: 0.5
Simple Simulation Preamble Text Will Be Included With Simple Simulations (.......... Indications): Simple simulation was performed today for the following reasons:
Treatment Time In Min (Optional): 0.50
Time Dose Fractionation (Optional- Include Units If Applicable): 86
Energy (Optional-Please Include Units) Rx 2: 50 kV
Day Of The Week Treatment Administered: Wednesday
Additional Prescription Justification Text: If there is any interruption in treatment exceeding 5 days please see Decay and Dose Adjustment Calculation and complete treatment under Prescription 2.

## 2017-07-14 ENCOUNTER — APPOINTMENT (OUTPATIENT)
Age: 67
Setting detail: DERMATOLOGY
End: 2017-07-14

## 2017-07-14 PROBLEM — C44.729 SQUAMOUS CELL CARCINOMA OF SKIN OF LEFT LOWER LIMB, INCLUDING HIP: Status: ACTIVE | Noted: 2017-07-14

## 2017-07-14 PROCEDURE — OTHER TREATMENT REGIMEN: OTHER

## 2017-07-14 PROCEDURE — OTHER SUPERFICIAL RADIATION TREATMENT: OTHER

## 2017-07-14 PROCEDURE — G6001 ECHO GUIDANCE RADIOTHERAPY: HCPCS

## 2017-07-14 PROCEDURE — 77401 RADIATION TX DELIVERY SUPFC: CPT

## 2017-07-14 PROCEDURE — OTHER FOLLOW UP FOR NEXT VISIT: OTHER

## 2017-07-14 PROCEDURE — 77280 THER RAD SIMULAJ FIELD SMPL: CPT

## 2017-07-14 NOTE — PROCEDURE: TREATMENT REGIMEN
Plan: Per the request of Dr. Reid, Blaire Irais was seen today for Superficial Radiation Therapy requiring simulation (CPT® 01801) in preparation for treatment of specific diseased site(s). Simulation is necessary to determine correct patient and treatment portal positioning, deliver safe and effective radiation therapy. A high frequency ultrasound image was acquired prior to treatment today for three dimensional evaluation of tumor volume and response to treatment, in addition, geometric accuracy of field placement (CPT® ). Physician evaluation of the ultrasound tumor depth will be ongoing through course of treatment, and is deemed medically necessary ensuring efficacy of treatment. Today’s image and setup was evaluated determining continuation of treatment with the current plan, or necessary changes as appropriate. All appropriate custom blocking and treatment parameters verified by radiation therapist according to initial simulation.\\n\\nUS image guidance and field placement prior to treatment delivery performed. US depth is 0.54mm.\\n\\nPer Dr. Reid, continued daily US guidance and simulation is required for field placement, measurement of tumor depth, progress and edema monitoring
Detail Level: Zone
Plan: Per the request of Dr. Reid, BlaireIrais was seen today for Superficial Radiation Therapy requiring simulation (CPT® 16095) in preparation for treatment of specific diseased site(s). Simulation is necessary to determine correct patient and treatment portal positioning, deliver safe and effective radiation therapy. A high frequency ultrasound image was acquired prior to treatment today for three dimensional evaluation of tumor volume and response to treatment, in addition, geometric accuracy of field placement (CPT® ). Physician evaluation of the ultrasound tumor depth will be ongoing through course of treatment, and is deemed medically necessary ensuring efficacy of treatment. Today’s image and setup was evaluated determining continuation of treatment with the current plan, or necessary changes as appropriate. All appropriate custom blocking and treatment parameters verified by radiation therapist according to initial simulation.\\n\\nUS image guidance and field placement prior to treatment delivery performed. US depth is n/a due to poor image quality.\\n\\nPer Dr. Reid, continued daily US guidance and simulation is required for field placement, measurement of tumor depth, progress and edema monitoring

## 2017-07-14 NOTE — PROCEDURE: SUPERFICIAL RADIATION TREATMENT
Total Number Of Fractions: 20
Custom Shielding Afterword Text Will Not Be Included With Simple Simulations (X X Y Cm............): port to correlate with the lesion size, including treatment margin. The custom lead shield is adequate to accommodate the appropriate applicator and provide adequate shielding around the treatment site. Additional shielding (as noted below) is used to protect sensitive, normal tissues.
Energy (Include Units): 50kV
Daily Fractionated Dose (Optional- Include Units): 253.11 cGy
Detail Level: Detailed
Port Dimensions-X Axis In Cm: 2
Number Of Days Off Treatment: 1
Field Size (Applicator) Rx 2: 4.0 cm
Bill For Dosimetry/Render Treatment Time Calculation In Note: No
Field Size (Applicator): 2.5 cm
Patient Positioning: Sitting
Treatment Device Design After Initial Simulation Justification (Will Render If Bill For Treatment Devices = Yes): The patient is status post radiation simulation and is evaluated as to the use of additional devices for shielding and placement for radiation therapy.
Additional Prescription Justification Text: If there is any interruption in treatment exceeding 5 days please see Decay and Dose Adjustment Calculation and complete treatment under Prescription 2.
Total Number Of Fractions Rx 2: 15
Energy (Optional-Please Include Units) Rx 2: 50 kV
Functional Status: 1 (ambulatory, light activity)
Fractions / Week: 3
Dose / Tx In Cgy (Optional): 253.11
Bill For Radiation Treatment: Yes
Fractionation Number (Evaluation): 5
Simple Simulation Preamble Text Will Be Included With Simple Simulations (.......... Indications): Simple simulation was performed today for the following reasons:
Simple Simulation Afterword Text Will Be Included With Simple Simulations (Indications............): The patient had a complete consultation regarding all applicable modalities for the treatment of their skin cancer and based on a variety of factors including the type of tumor, size, and location, the relevant medical history as well as local tissue factors, the functional status of the individual, the ability to perform necessary postoperative wound instructions and the need for simultaneous treatments as well as overall wound healing status, it was determined that the patient would begin radiation therapy treatment for skin cancer.  A full simulation and treatment device design was performed including the determination and formulation of appropriate simple and complex devices including lead shield of 0.762 mm thickness to form molded customized shielding to specifically correlate with the lesion size including treatment margin.  The custom lead shield is adequate to accommodate the appropriate applicator and provide adequate shielding around the treatment site.  The specific field applicator, shields, and devices both simple and complex as well as the specific patient setup is outlined below.  The patient was given a full consent for superficial radiation to both verbally and in writing and the full determination of patient's eligibility for treatment and selection is outlined on the patient eligibility and treatment selection form.  The specific superficial radiotherapy prescription was determined and was documented on the superficial radiotherapy prescription form.  A treatment calculation was also performed and documented on the treatment calculation form.  Based on the prescription, the patient was scheduled for a series of fractional treatments.
Treatment Time In Min (Optional): 0.50
Treatment Margins In Cm: 0.5
Assessment: Appropriate reaction
Day Of The Week Treatment Administered: Friday
Intro Statement (Will Not Render If Left Blank): The patient is undergoing superficial radiation therapy for skin cancer and presents for weekly evaluation and management.  Per protocol and as documented on the flow sheet, the patient was questioned as to subjective redness, pruritus, pain, drainage, fatigue, or any other symptoms.  Objectively, the radiation area was evaluated with regards to erythema, atrophy, scale, crusting, erosion, ulceration, edema, purpura, tenderness, warmth, drainage, and any other findings.  The plan was extensively reviewed including the dose, and dosing schedule.  The simulation and clinical setup was also reviewed as was the external and any internal shields and based on this review the appropriateness and sufficiency of treatment was determined.
Shielding Size (Optional- Include Units) Rx 2: 3.0 x 2.5 cm
Fractionation Number: 0
Custom Shielding Preamble Text Will Not Be Included With Simple Simulations (.......... X X Y Cm): A lead shield of 0.762 mm thickness is utilized to form a molded, custom shield with a
Dose / Tx In Cgy (Optional): 383.5
Day Of The Week Treatment Administered: Wednesday
Computed Treatment Time In Min (Will Render The Same As Calculated Treatment Time If Left Blank): 0.33
Treatment Time / Fractionation (Optional- Include Units): 0.33 min
Cumulative Dose In Cgy (Optional): 1012.44
Cumulative Dose In Cgy (Optional): 1265.55
Time Dose Fractionation (Optional- Include Units If Applicable): 86
Total Dose (Optional-Please Include Units): 5062.2 cGy
Shielding Size (Optional- Include Units): 2.0 x 2.0 cm
Fractionation Number: 4

## 2017-07-17 ENCOUNTER — APPOINTMENT (OUTPATIENT)
Age: 67
Setting detail: DERMATOLOGY
End: 2017-07-24

## 2017-07-17 PROBLEM — C44.729 SQUAMOUS CELL CARCINOMA OF SKIN OF LEFT LOWER LIMB, INCLUDING HIP: Status: ACTIVE | Noted: 2017-07-17

## 2017-07-17 PROCEDURE — OTHER FOLLOW UP FOR NEXT VISIT: OTHER

## 2017-07-17 PROCEDURE — 77427 RADIATION TX MANAGEMENT X5: CPT

## 2017-07-17 PROCEDURE — OTHER SUPERFICIAL RADIATION TREATMENT: OTHER

## 2017-07-17 PROCEDURE — OTHER TREATMENT REGIMEN: OTHER

## 2017-07-17 PROCEDURE — G6001 ECHO GUIDANCE RADIOTHERAPY: HCPCS

## 2017-07-17 NOTE — PROCEDURE: SUPERFICIAL RADIATION TREATMENT
Dose Per Fractionation In Cgy (Optional): 253.11
Field Size (Applicator): 2.5 cm
Render Text From Evaluation And Management Tab (Will Not Bill 37348): No
Fractions / Week Rx 2: 3
Fractions / Week Rx 3: 5
Shielding Size (Optional- Include Units) Rx 2: 3.0 x 2.5 cm
Energy (Optional-Please Include Units): 50kV
Additional Prescription Justification Text: If there is any interruption in treatment exceeding 5 days please see Decay and Dose Adjustment Calculation and complete treatment under Prescription 2.
Dimensions-X Axis In Cm: 1
Assessment: Appropriate reaction
Total Number Of Fractions Rx 2: 15
Custom Shielding Afterword Text Will Not Be Included With Simple Simulations (X X Y Cm............): port to correlate with the lesion size, including treatment margin. The custom lead shield is adequate to accommodate the appropriate applicator and provide adequate shielding around the treatment site. Additional shielding (as noted below) is used to protect sensitive, normal tissues.
Field Size (Applicator) Rx 2: 4.0 cm
Simple Simulation Afterword Text Will Be Included With Simple Simulations (Indications............): The patient had a complete consultation regarding all applicable modalities for the treatment of their skin cancer and based on a variety of factors including the type of tumor, size, and location, the relevant medical history as well as local tissue factors, the functional status of the individual, the ability to perform necessary postoperative wound instructions and the need for simultaneous treatments as well as overall wound healing status, it was determined that the patient would begin radiation therapy treatment for skin cancer.  A full simulation and treatment device design was performed including the determination and formulation of appropriate simple and complex devices including lead shield of 0.762 mm thickness to form molded customized shielding to specifically correlate with the lesion size including treatment margin.  The custom lead shield is adequate to accommodate the appropriate applicator and provide adequate shielding around the treatment site.  The specific field applicator, shields, and devices both simple and complex as well as the specific patient setup is outlined below.  The patient was given a full consent for superficial radiation to both verbally and in writing and the full determination of patient's eligibility for treatment and selection is outlined on the patient eligibility and treatment selection form.  The specific superficial radiotherapy prescription was determined and was documented on the superficial radiotherapy prescription form.  A treatment calculation was also performed and documented on the treatment calculation form.  Based on the prescription, the patient was scheduled for a series of fractional treatments.
Daily Fractionated Dose (Optional- Include Units): 253.11 cGy
Dose / Tx In Cgy (Optional): 383.5
Total Dose (Optional-Please Include Units): 5062.2 cGy
Custom Shielding Preamble Text Will Not Be Included With Simple Simulations (.......... X X Y Cm): A lead shield of 0.762 mm thickness is utilized to form a molded, custom shield with a
Treatment Device Design After Initial Simulation Justification (Will Render If Bill For Treatment Devices = Yes): The patient is status post radiation simulation and is evaluated as to the use of additional devices for shielding and placement for radiation therapy.
Bill For Radiation Treatment: Yes
Treatment Time / Fractionation (Optional- Include Units): 0.33 min
Functional Status: 1 (ambulatory, light activity)
Time Dose Fractionation (Optional- Include Units If Applicable): 86
Simple Simulation Preamble Text Will Be Included With Simple Simulations (.......... Indications): Simple simulation was performed today for the following reasons:
Fractionation Number: 0
Energy (Optional-Please Include Units) Rx 2: 50 kV
Total Number Of Fractions: 20
Port Dimensions-X Axis In Cm: 2
Detail Level: Detailed
Shielding Size (Optional- Include Units): 2.0 x 2.0 cm
Treatment Time In Min (Optional): 0.50
Intro Statement (Will Not Render If Left Blank): The patient is undergoing superficial radiation therapy for skin cancer and presents for weekly evaluation and management.  Per protocol and as documented on the flow sheet, the patient was questioned as to subjective redness, pruritus, pain, drainage, fatigue, or any other symptoms.  Objectively, the radiation area was evaluated with regards to erythema, atrophy, scale, crusting, erosion, ulceration, edema, purpura, tenderness, warmth, drainage, and any other findings.  The plan was extensively reviewed including the dose, and dosing schedule.  The simulation and clinical setup was also reviewed as was the external and any internal shields and based on this review the appropriateness and sufficiency of treatment was determined.
Patient Positioning: Sitting
Cumulative Dose In Cgy (Optional): 1265.55
Computed Treatment Time In Min (Will Render The Same As Calculated Treatment Time If Left Blank): 0.33
Treatment Margins In Cm: 0.5
Day Of The Week Treatment Administered: Friday

## 2017-07-19 ENCOUNTER — APPOINTMENT (OUTPATIENT)
Age: 67
Setting detail: DERMATOLOGY
End: 2017-07-24

## 2017-07-19 PROBLEM — C44.729 SQUAMOUS CELL CARCINOMA OF SKIN OF LEFT LOWER LIMB, INCLUDING HIP: Status: ACTIVE | Noted: 2017-07-19

## 2017-07-19 PROCEDURE — 77280 THER RAD SIMULAJ FIELD SMPL: CPT

## 2017-07-19 PROCEDURE — 77401 RADIATION TX DELIVERY SUPFC: CPT

## 2017-07-19 PROCEDURE — G6001 ECHO GUIDANCE RADIOTHERAPY: HCPCS

## 2017-07-19 PROCEDURE — OTHER SUPERFICIAL RADIATION TREATMENT: OTHER

## 2017-07-19 PROCEDURE — OTHER FOLLOW UP FOR NEXT VISIT: OTHER

## 2017-07-19 PROCEDURE — OTHER TREATMENT REGIMEN: OTHER

## 2017-07-19 NOTE — PROCEDURE: TREATMENT REGIMEN
Detail Level: Zone
Plan: Per the request of Dr. Reid, Blaire Irais was seen today for Superficial Radiation Therapy requiring simulation (CPT® 61453) in preparation for treatment of specific diseased site(s). Simulation is necessary to determine correct patient and treatment portal positioning, deliver safe and effective radiation therapy. A high frequency ultrasound image was acquired prior to treatment today for three dimensional evaluation of tumor volume and response to treatment, in addition, geometric accuracy of field placement (CPT® ). Physician evaluation of the ultrasound tumor depth will be ongoing through course of treatment, and is deemed medically necessary ensuring efficacy of treatment. Today’s image and setup was evaluated determining continuation of treatment with the current plan, or necessary changes as appropriate. All appropriate custom blocking and treatment parameters verified by radiation therapist according to initial simulation.\\n\\nUS image guidance and field placement prior to treatment delivery performed. US depth is 0.53mm.\\n\\nPer Dr. Reid, continued daily US guidance and simulation is required for field placement, measurement of tumor depth, progress and edema monitoring

## 2017-07-19 NOTE — PROCEDURE: SUPERFICIAL RADIATION TREATMENT
Port Dimensions-X Axis In Cm: 2
Energy (Optional-Please Include Units): 50kV
Intro Statement (Will Not Render If Left Blank): The patient is undergoing superficial radiation therapy for skin cancer and presents for weekly evaluation and management.  Per protocol and as documented on the flow sheet, the patient was questioned as to subjective redness, pruritus, pain, drainage, fatigue, or any other symptoms.  Objectively, the radiation area was evaluated with regards to erythema, atrophy, scale, crusting, erosion, ulceration, edema, purpura, tenderness, warmth, drainage, and any other findings.  The plan was extensively reviewed including the dose, and dosing schedule.  The simulation and clinical setup was also reviewed as was the external and any internal shields and based on this review the appropriateness and sufficiency of treatment was determined.
Dose / Tx In Cgy (Optional): 383.5
Shielding Size (Optional- Include Units): 2.0 x 2.0 cm
Fractions / Week Rx 3: 5
Field Number: 1
Vinh For Simulation Without Treatment Device Design (Simple Simulation): No
Treatment Time / Fractionation (Optional- Include Units): 0.33 min
Additional Prescription Justification Text: If there is any interruption in treatment exceeding 5 days please see Decay and Dose Adjustment Calculation and complete treatment under Prescription 2.
Dose Per Fractionation In Cgy (Optional): 253.11
Total Dose (Optional-Please Include Units): 5062.2 cGy
Custom Shielding Afterword Text Will Not Be Included With Simple Simulations (X X Y Cm............): port to correlate with the lesion size, including treatment margin. The custom lead shield is adequate to accommodate the appropriate applicator and provide adequate shielding around the treatment site. Additional shielding (as noted below) is used to protect sensitive, normal tissues.
Assessment: Appropriate reaction
Day Of The Week Treatment Administered: Wednesday
Cumulative Dose In Cgy (Optional): 1518.66
Functional Status: 1 (ambulatory, light activity)
Fractions / Week Rx 2: 3
Total Number Of Fractions Rx 4: 15
Computed Treatment Time In Min (Will Render The Same As Calculated Treatment Time If Left Blank): 0.33
Field Size (Applicator) Rx 2: 4.0 cm
Shielding Size (Optional- Include Units) Rx 2: 3.0 x 2.5 cm
Initial Radiation Treatment Planning (Will Render If Bill Simulation = Yes): The patient had a complete consultation regarding all applicable modalities for the treatment of their skin cancer and based on a variety of factors including the type of tumor, size, and location, the relevant medical history as well as local tissue factors, the functional status of the individual, the ability to perform necessary postoperative wound instructions and the need for simultaneous treatments as well as overall wound healing status, it was determined that the patient would begin radiation therapy treatment for skin cancer.  A full simulation and treatment device design was performed including the determination and formulation of appropriate simple and complex devices including lead shield of 0.762 mm thickness to form molded customized shielding to specifically correlate with the lesion size including treatment margin.  The custom lead shield is adequate to accommodate the appropriate applicator and provide adequate shielding around the treatment site.  The specific field applicator, shields, and devices both simple and complex as well as the specific patient setup is outlined below.  The patient was given a full consent for superficial radiation to both verbally and in writing and the full determination of patient's eligibility for treatment and selection is outlined on the patient eligibility and treatment selection form.  The specific superficial radiotherapy prescription was determined and was documented on the superficial radiotherapy prescription form.  A treatment calculation was also performed and documented on the treatment calculation form.  Based on the prescription, the patient was scheduled for a series of fractional treatments.
Simple Simulation Preamble Text Will Be Included With Simple Simulations (.......... Indications): Simple simulation was performed today for the following reasons:
Bill For Radiation Treatment: Yes
Patient Positioning: Sitting
Field Size (Applicator): 2.5 cm
Treatment Time In Min (Optional): 0.50
Fractionation Number: 6
Total Number Of Fractions: 20
Fractionation Number: 0
Time Dose Fractionation (Optional- Include Units If Applicable): 86
Daily Fractionated Dose (Optional- Include Units): 253.11 cGy
Detail Level: Detailed
Custom Shielding Preamble Text Will Not Be Included With Simple Simulations (.......... X X Y Cm): A lead shield of 0.762 mm thickness is utilized to form a molded, custom shield with a
Treatment Device Design After Initial Simulation Justification (Will Render If Bill For Treatment Devices = Yes): The patient is status post radiation simulation and is evaluated as to the use of additional devices for shielding and placement for radiation therapy.
Treatment Margins In Cm: 0.5
Energy (Optional-Please Include Units) Rx 2: 50 kV

## 2017-07-21 ENCOUNTER — APPOINTMENT (OUTPATIENT)
Age: 67
Setting detail: DERMATOLOGY
End: 2017-07-24

## 2017-07-21 PROBLEM — C44.729 SQUAMOUS CELL CARCINOMA OF SKIN OF LEFT LOWER LIMB, INCLUDING HIP: Status: ACTIVE | Noted: 2017-07-21

## 2017-07-21 PROCEDURE — OTHER FOLLOW UP FOR NEXT VISIT: OTHER

## 2017-07-21 PROCEDURE — G6001 ECHO GUIDANCE RADIOTHERAPY: HCPCS

## 2017-07-21 PROCEDURE — OTHER TREATMENT REGIMEN: OTHER

## 2017-07-21 PROCEDURE — OTHER SUPERFICIAL RADIATION TREATMENT: OTHER

## 2017-07-21 PROCEDURE — 77401 RADIATION TX DELIVERY SUPFC: CPT

## 2017-07-21 PROCEDURE — 77280 THER RAD SIMULAJ FIELD SMPL: CPT

## 2017-07-21 NOTE — PROCEDURE: TREATMENT REGIMEN
Detail Level: Zone
Plan: Per the request of Dr. Reid, Blaire Irais was seen today for Superficial Radiation Therapy requiring simulation (CPT® 30730) in preparation for treatment of specific diseased site(s). Simulation is necessary to determine correct patient and treatment portal positioning, deliver safe and effective radiation therapy. A high frequency ultrasound image was acquired prior to treatment today for three dimensional evaluation of tumor volume and response to treatment, in addition, geometric accuracy of field placement (CPT® ). Physician evaluation of the ultrasound tumor depth will be ongoing through course of treatment, and is deemed medically necessary ensuring efficacy of treatment. Today’s image and setup was evaluated determining continuation of treatment with the current plan, or necessary changes as appropriate. All appropriate custom blocking and treatment parameters verified by radiation therapist according to initial simulation.\\n\\nUS image guidance and field placement prior to treatment delivery performed. US depth is 0.50mm.\\n\\nPer Dr. Reid, continued daily US guidance and simulation is required for field placement, measurement of tumor depth, progress and edema monitoring

## 2017-07-21 NOTE — PROCEDURE: SUPERFICIAL RADIATION TREATMENT
Field Number: 1
Treatment Time / Fractionation (Optional- Include Units): 0.33 min
Fractions / Week Rx 4: 5
Total Number Of Fractions: 20
Simple Simulation Afterword Text Will Be Included With Simple Simulations (Indications............): The patient had a complete consultation regarding all applicable modalities for the treatment of their skin cancer and based on a variety of factors including the type of tumor, size, and location, the relevant medical history as well as local tissue factors, the functional status of the individual, the ability to perform necessary postoperative wound instructions and the need for simultaneous treatments as well as overall wound healing status, it was determined that the patient would begin radiation therapy treatment for skin cancer.  A full simulation and treatment device design was performed including the determination and formulation of appropriate simple and complex devices including lead shield of 0.762 mm thickness to form molded customized shielding to specifically correlate with the lesion size including treatment margin.  The custom lead shield is adequate to accommodate the appropriate applicator and provide adequate shielding around the treatment site.  The specific field applicator, shields, and devices both simple and complex as well as the specific patient setup is outlined below.  The patient was given a full consent for superficial radiation to both verbally and in writing and the full determination of patient's eligibility for treatment and selection is outlined on the patient eligibility and treatment selection form.  The specific superficial radiotherapy prescription was determined and was documented on the superficial radiotherapy prescription form.  A treatment calculation was also performed and documented on the treatment calculation form.  Based on the prescription, the patient was scheduled for a series of fractional treatments.
Shielding Size (Optional- Include Units) Rx 2: 3.0 x 2.5 cm
Energy (Optional-Please Include Units) Rx 2: 50 kV
Fractionation Number: 0
Port Dimensions-Y Axis In Cm: 2
Computed Treatment Time In Min (Will Render The Same As Calculated Treatment Time If Left Blank): 0.33
Render Additional Prescriptions In Note?: No
Dose Per Fractionation In Cgy (Optional): 253.11
Time Dose Fractionation (Optional- Include Units If Applicable): 86
Field Size (Applicator): 2.5 cm
Patient Positioning: Sitting
Energy (Optional-Please Include Units): 50kV
Treatment Time In Min (Optional): 0.50
Fractions / Week Rx 2: 3
Custom Shielding Afterword Text Will Not Be Included With Simple Simulations (X X Y Cm............): port to correlate with the lesion size, including treatment margin. The custom lead shield is adequate to accommodate the appropriate applicator and provide adequate shielding around the treatment site. Additional shielding (as noted below) is used to protect sensitive, normal tissues.
Detail Level: Detailed
Bill For Radiation Treatment: Yes
Assessment: Appropriate reaction
Treatment Device Design After Initial Simulation Justification (Will Render If Bill For Treatment Devices = Yes): The patient is status post radiation simulation and is evaluated as to the use of additional devices for shielding and placement for radiation therapy.
Intro Statement (Will Not Render If Left Blank): The patient is undergoing superficial radiation therapy for skin cancer and presents for weekly evaluation and management.  Per protocol and as documented on the flow sheet, the patient was questioned as to subjective redness, pruritus, pain, drainage, fatigue, or any other symptoms.  Objectively, the radiation area was evaluated with regards to erythema, atrophy, scale, crusting, erosion, ulceration, edema, purpura, tenderness, warmth, drainage, and any other findings.  The plan was extensively reviewed including the dose, and dosing schedule.  The simulation and clinical setup was also reviewed as was the external and any internal shields and based on this review the appropriateness and sufficiency of treatment was determined.
Additional Prescription Justification Text: If there is any interruption in treatment exceeding 5 days please see Decay and Dose Adjustment Calculation and complete treatment under Prescription 2.
Dose / Tx In Cgy (Optional): 383.5
Total Number Of Fractions Rx 3: 15
Shielding Size (Optional- Include Units): 2.0 x 2.0 cm
Daily Fractionated Dose (Optional- Include Units): 253.11 cGy
Field Size (Applicator) Rx 2: 4.0 cm
Total Dose (Optional-Please Include Units): 5062.2 cGy
Custom Shielding Preamble Text Will Not Be Included With Simple Simulations (.......... X X Y Cm): A lead shield of 0.762 mm thickness is utilized to form a molded, custom shield with a
Simple Simulation Preamble Text Will Be Included With Simple Simulations (.......... Indications): Simple simulation was performed today for the following reasons:
Fractionation Number: 7
Treatment Margins In Cm: 0.5
Functional Status: 1 (ambulatory, light activity)
Day Of The Week Treatment Administered: Friday
Cumulative Dose In Cgy (Optional): 1771.77

## 2017-07-26 ENCOUNTER — APPOINTMENT (OUTPATIENT)
Age: 67
Setting detail: DERMATOLOGY
End: 2017-07-26

## 2017-07-26 PROBLEM — C44.729 SQUAMOUS CELL CARCINOMA OF SKIN OF LEFT LOWER LIMB, INCLUDING HIP: Status: ACTIVE | Noted: 2017-07-26

## 2017-07-26 PROCEDURE — OTHER FOLLOW UP FOR NEXT VISIT: OTHER

## 2017-07-26 PROCEDURE — OTHER TREATMENT REGIMEN: OTHER

## 2017-07-26 PROCEDURE — G6001 ECHO GUIDANCE RADIOTHERAPY: HCPCS

## 2017-07-26 PROCEDURE — 77401 RADIATION TX DELIVERY SUPFC: CPT

## 2017-07-26 PROCEDURE — 77280 THER RAD SIMULAJ FIELD SMPL: CPT

## 2017-07-26 PROCEDURE — OTHER SUPERFICIAL RADIATION TREATMENT: OTHER

## 2017-07-26 NOTE — PROCEDURE: SUPERFICIAL RADIATION TREATMENT
Dose Per Fractionation In Cgy (Optional): 253.11
Total Dose (Optional-Please Include Units): 5062.2 cGy
Treatment Time In Min (Optional): 0.50
Field Size (Applicator): 2.5 cm
Daily Fractionated Dose (Optional- Include Units): 253.11 cGy
Include Rx 2 When Rendering Additional Prescriptions: No
Fractions / Week: 3
Initial Radiation Treatment Planning (Will Render If Bill Simulation = Yes): The patient had a complete consultation regarding all applicable modalities for the treatment of their skin cancer and based on a variety of factors including the type of tumor, size, and location, the relevant medical history as well as local tissue factors, the functional status of the individual, the ability to perform necessary postoperative wound instructions and the need for simultaneous treatments as well as overall wound healing status, it was determined that the patient would begin radiation therapy treatment for skin cancer.  A full simulation and treatment device design was performed including the determination and formulation of appropriate simple and complex devices including lead shield of 0.762 mm thickness to form molded customized shielding to specifically correlate with the lesion size including treatment margin.  The custom lead shield is adequate to accommodate the appropriate applicator and provide adequate shielding around the treatment site.  The specific field applicator, shields, and devices both simple and complex as well as the specific patient setup is outlined below.  The patient was given a full consent for superficial radiation to both verbally and in writing and the full determination of patient's eligibility for treatment and selection is outlined on the patient eligibility and treatment selection form.  The specific superficial radiotherapy prescription was determined and was documented on the superficial radiotherapy prescription form.  A treatment calculation was also performed and documented on the treatment calculation form.  Based on the prescription, the patient was scheduled for a series of fractional treatments.
Custom Shielding Afterword Text Will Not Be Included With Simple Simulations (X X Y Cm............): port to correlate with the lesion size, including treatment margin. The custom lead shield is adequate to accommodate the appropriate applicator and provide adequate shielding around the treatment site. Additional shielding (as noted below) is used to protect sensitive, normal tissues.
Custom Shielding Preamble Text Will Not Be Included With Simple Simulations (.......... X X Y Cm): A lead shield of 0.762 mm thickness is utilized to form a molded, custom shield with a
Fractions / Week Rx 3: 5
Assessment: Appropriate reaction
Additional Prescription Justification Text: If there is any interruption in treatment exceeding 5 days please see Decay and Dose Adjustment Calculation and complete treatment under Prescription 2.
Detail Level: Detailed
Energy (Optional-Please Include Units): 50kV
Field Size (Applicator) Rx 2: 4.0 cm
Simple Simulation Preamble Text Will Be Included With Simple Simulations (.......... Indications): Simple simulation was performed today for the following reasons:
Shielding Size (Optional- Include Units): 2.0 x 2.0 cm
Time Dose Fractionation (Optional- Include Units If Applicable): 86
Cumulative Dose In Cgy (Optional): 2024.88
Total Number Of Fractions Rx 3: 15
Fractionation Number: 8
Number Of Days Off Treatment: 1
Patient Positioning: Sitting
Port Dimensions-X Axis In Cm: 2
Computed Treatment Time In Min (Will Render The Same As Calculated Treatment Time If Left Blank): 0.33
Functional Status: 1 (ambulatory, light activity)
Total Number Of Fractions: 20
Energy (Optional-Please Include Units) Rx 2: 50 kV
Dose / Tx In Cgy (Optional): 383.5
Treatment Margins In Cm: 0.5
Shielding Size (Optional- Include Units) Rx 2: 3.0 x 2.5 cm
Bill For Radiation Treatment: Yes
Treatment Device Design After Initial Simulation Justification (Will Render If Bill For Treatment Devices = Yes): The patient is status post radiation simulation and is evaluated as to the use of additional devices for shielding and placement for radiation therapy.
Day Of The Week Treatment Administered: Wednesday
Intro Statement (Will Not Render If Left Blank): The patient is undergoing superficial radiation therapy for skin cancer and presents for weekly evaluation and management.  Per protocol and as documented on the flow sheet, the patient was questioned as to subjective redness, pruritus, pain, drainage, fatigue, or any other symptoms.  Objectively, the radiation area was evaluated with regards to erythema, atrophy, scale, crusting, erosion, ulceration, edema, purpura, tenderness, warmth, drainage, and any other findings.  The plan was extensively reviewed including the dose, and dosing schedule.  The simulation and clinical setup was also reviewed as was the external and any internal shields and based on this review the appropriateness and sufficiency of treatment was determined.
Fractionation Number: 0
Treatment Time / Fractionation (Optional- Include Units): 0.33 min

## 2017-07-26 NOTE — PROCEDURE: TREATMENT REGIMEN
Detail Level: Zone
Plan: Per the request of Dr. Reid, Blaire Irais was seen today for Superficial Radiation Therapy requiring simulation (CPT® 71343) in preparation for treatment of specific diseased site(s). Simulation is necessary to determine correct patient and treatment portal positioning, deliver safe and effective radiation therapy. A high frequency ultrasound image was acquired prior to treatment today for three dimensional evaluation of tumor volume and response to treatment, in addition, geometric accuracy of field placement (CPT® ). Physician evaluation of the ultrasound tumor depth will be ongoing through course of treatment, and is deemed medically necessary ensuring efficacy of treatment. Today’s image and setup was evaluated determining continuation of treatment with the current plan, or necessary changes as appropriate. All appropriate custom blocking and treatment parameters verified by radiation therapist according to initial simulation.\\n\\nUS image guidance and field placement prior to treatment delivery performed. US depth is 0.74mm.\\n\\nPer Dr. Reid, continued daily US guidance and simulation is required for field placement, measurement of tumor depth, progress and edema monitoring

## 2017-07-28 ENCOUNTER — APPOINTMENT (OUTPATIENT)
Age: 67
Setting detail: DERMATOLOGY
End: 2017-07-28

## 2017-07-28 PROBLEM — C44.729 SQUAMOUS CELL CARCINOMA OF SKIN OF LEFT LOWER LIMB, INCLUDING HIP: Status: ACTIVE | Noted: 2017-07-28

## 2017-07-28 PROCEDURE — OTHER FOLLOW UP FOR NEXT VISIT: OTHER

## 2017-07-28 PROCEDURE — 77401 RADIATION TX DELIVERY SUPFC: CPT

## 2017-07-28 PROCEDURE — 77280 THER RAD SIMULAJ FIELD SMPL: CPT

## 2017-07-28 PROCEDURE — OTHER SUPERFICIAL RADIATION TREATMENT: OTHER

## 2017-07-28 PROCEDURE — OTHER TREATMENT REGIMEN: OTHER

## 2017-07-28 PROCEDURE — G6001 ECHO GUIDANCE RADIOTHERAPY: HCPCS

## 2017-07-28 NOTE — PROCEDURE: SUPERFICIAL RADIATION TREATMENT
Energy (Include Units): 50kV
Render Text From Evaluation And Management Tab (Will Not Bill 14810): No
Prescription Used: 1
Shielding Size (Optional- Include Units): 2.0 x 2.0 cm
Functional Status: 1 (ambulatory, light activity)
Additional Prescription Justification Text: If there is any interruption in treatment exceeding 5 days please see Decay and Dose Adjustment Calculation and complete treatment under Prescription 2.
Assessment: Appropriate reaction
Treatment Margins In Cm: 0.5
Total Number Of Fractions Rx 2: 15
Shielding Size (Optional- Include Units) Rx 2: 3.0 x 2.5 cm
Patient Positioning: Sitting
Port Dimensions-Y Axis In Cm: 2
Dose / Tx In Cgy (Optional): 383.5
Detail Level: Detailed
Fractions / Week: 3
Daily Fractionated Dose (Optional- Include Units): 253.11 cGy
Bill For Radiation Treatment: Yes
Intro Statement (Will Not Render If Left Blank): The patient is undergoing superficial radiation therapy for skin cancer and presents for weekly evaluation and management.  Per protocol and as documented on the flow sheet, the patient was questioned as to subjective redness, pruritus, pain, drainage, fatigue, or any other symptoms.  Objectively, the radiation area was evaluated with regards to erythema, atrophy, scale, crusting, erosion, ulceration, edema, purpura, tenderness, warmth, drainage, and any other findings.  The plan was extensively reviewed including the dose, and dosing schedule.  The simulation and clinical setup was also reviewed as was the external and any internal shields and based on this review the appropriateness and sufficiency of treatment was determined.
Fractionation Number (Evaluation): 5
Total Number Of Fractions: 20
Custom Shielding Afterword Text Will Not Be Included With Simple Simulations (X X Y Cm............): port to correlate with the lesion size, including treatment margin. The custom lead shield is adequate to accommodate the appropriate applicator and provide adequate shielding around the treatment site. Additional shielding (as noted below) is used to protect sensitive, normal tissues.
Dose / Tx In Cgy (Optional): 253.11
Energy (Optional-Please Include Units) Rx 2: 50 kV
Initial Radiation Treatment Planning (Will Render If Bill Simulation = Yes): The patient had a complete consultation regarding all applicable modalities for the treatment of their skin cancer and based on a variety of factors including the type of tumor, size, and location, the relevant medical history as well as local tissue factors, the functional status of the individual, the ability to perform necessary postoperative wound instructions and the need for simultaneous treatments as well as overall wound healing status, it was determined that the patient would begin radiation therapy treatment for skin cancer.  A full simulation and treatment device design was performed including the determination and formulation of appropriate simple and complex devices including lead shield of 0.762 mm thickness to form molded customized shielding to specifically correlate with the lesion size including treatment margin.  The custom lead shield is adequate to accommodate the appropriate applicator and provide adequate shielding around the treatment site.  The specific field applicator, shields, and devices both simple and complex as well as the specific patient setup is outlined below.  The patient was given a full consent for superficial radiation to both verbally and in writing and the full determination of patient's eligibility for treatment and selection is outlined on the patient eligibility and treatment selection form.  The specific superficial radiotherapy prescription was determined and was documented on the superficial radiotherapy prescription form.  A treatment calculation was also performed and documented on the treatment calculation form.  Based on the prescription, the patient was scheduled for a series of fractional treatments.
Treatment Device Design After Initial Simulation Justification (Will Render If Bill For Treatment Devices = Yes): The patient is status post radiation simulation and is evaluated as to the use of additional devices for shielding and placement for radiation therapy.
Time Dose Fractionation (Optional- Include Units If Applicable): 86
Field Size (Applicator) Rx 2: 4.0 cm
Custom Shielding Preamble Text Will Not Be Included With Simple Simulations (.......... X X Y Cm): A lead shield of 0.762 mm thickness is utilized to form a molded, custom shield with a
Treatment Time In Min (Optional): 0.33
Total Dose (Optional-Please Include Units): 5062.2 cGy
Field Size (Applicator): 2.5 cm
Simple Simulation Preamble Text Will Be Included With Simple Simulations (.......... Indications): Simple simulation was performed today for the following reasons:
Day Of The Week Treatment Administered: Friday
Cumulative Dose In Cgy (Optional): 2277.99
Treatment Time In Min (Optional): 0.50
Treatment Time / Fractionation (Optional- Include Units): 0.33 min
Fractionation Number: 0
Fractionation Number: 9

## 2017-07-28 NOTE — PROCEDURE: TREATMENT REGIMEN
Detail Level: Zone
Plan: Per the request of Blaire Camejo Sherry was seen today for Superficial Radiation Therapy requiring simulation (CPT® 25450) in preparation for treatment of specific diseased site(s). Simulation is necessary to determine correct patient and treatment portal positioning, deliver safe and effective radiation therapy. A high frequency ultrasound image was acquired prior to treatment today for three dimensional evaluation of tumor volume and response to treatment, in addition, geometric accuracy of field placement (CPT® ). Physician evaluation of the ultrasound tumor depth will be ongoing through course of treatment, and is deemed medically necessary ensuring efficacy of treatment. Today’s image and setup was evaluated determining continuation of treatment with the current plan, or necessary changes as appropriate. All appropriate custom blocking and treatment parameters verified by radiation therapist according to initial simulation.\\n\\nUS image guidance and field placement prior to treatment delivery performed. US depth is 0mm.\\n\\nPer Dr. Reid, continued daily US guidance and simulation is required for field placement, measurement of tumor depth, progress and edema monitoring

## 2017-07-31 ENCOUNTER — APPOINTMENT (OUTPATIENT)
Age: 67
Setting detail: DERMATOLOGY
End: 2017-07-31

## 2017-07-31 PROBLEM — C44.729 SQUAMOUS CELL CARCINOMA OF SKIN OF LEFT LOWER LIMB, INCLUDING HIP: Status: ACTIVE | Noted: 2017-07-31

## 2017-07-31 PROCEDURE — 77280 THER RAD SIMULAJ FIELD SMPL: CPT

## 2017-07-31 PROCEDURE — OTHER TREATMENT REGIMEN: OTHER

## 2017-07-31 PROCEDURE — G6001 ECHO GUIDANCE RADIOTHERAPY: HCPCS

## 2017-07-31 PROCEDURE — OTHER FOLLOW UP FOR NEXT VISIT: OTHER

## 2017-07-31 PROCEDURE — 77401 RADIATION TX DELIVERY SUPFC: CPT

## 2017-07-31 PROCEDURE — OTHER SUPERFICIAL RADIATION TREATMENT: OTHER

## 2017-07-31 NOTE — PROCEDURE: TREATMENT REGIMEN
Plan: Per the request of Dr. Reid, Blaire Irais was seen today for Superficial Radiation Therapy requiring simulation (CPT® 61790) in preparation for treatment of specific diseased site(s). Simulation is necessary to determine correct patient and treatment portal positioning, deliver safe and effective radiation therapy. A high frequency ultrasound image was acquired prior to treatment today for three dimensional evaluation of tumor volume and response to treatment, in addition, geometric accuracy of field placement (CPT® ). Physician evaluation of the ultrasound tumor depth will be ongoing through course of treatment, and is deemed medically necessary ensuring efficacy of treatment. Today’s image and setup was evaluated determining continuation of treatment with the current plan, or necessary changes as appropriate. All appropriate custom blocking and treatment parameters verified by radiation therapist according to initial simulation.\\n\\nUS image guidance and field placement prior to treatment delivery performed. US depth is 0.7mm.\\n\\nPer Dr. Reid, continued daily US guidance and simulation is required for field placement, measurement of tumor depth, progress and edema monitoring
Detail Level: Zone

## 2017-07-31 NOTE — PROCEDURE: SUPERFICIAL RADIATION TREATMENT
Port Dimensions-X Axis In Cm: 2
Time Dose Fractionation (Optional- Include Units If Applicable): 86
Include Rx 3 When Rendering Additional Prescriptions: No
Fractionation Number (Evaluation): 5
Dose / Tx In Cgy (Optional): 383.5
Field Size (Applicator): 2.5 cm
Bill For Radiation Treatment: Yes
Initial Radiation Treatment Planning (Will Render If Bill Simulation = Yes): The patient had a complete consultation regarding all applicable modalities for the treatment of their skin cancer and based on a variety of factors including the type of tumor, size, and location, the relevant medical history as well as local tissue factors, the functional status of the individual, the ability to perform necessary postoperative wound instructions and the need for simultaneous treatments as well as overall wound healing status, it was determined that the patient would begin radiation therapy treatment for skin cancer.  A full simulation and treatment device design was performed including the determination and formulation of appropriate simple and complex devices including lead shield of 0.762 mm thickness to form molded customized shielding to specifically correlate with the lesion size including treatment margin.  The custom lead shield is adequate to accommodate the appropriate applicator and provide adequate shielding around the treatment site.  The specific field applicator, shields, and devices both simple and complex as well as the specific patient setup is outlined below.  The patient was given a full consent for superficial radiation to both verbally and in writing and the full determination of patient's eligibility for treatment and selection is outlined on the patient eligibility and treatment selection form.  The specific superficial radiotherapy prescription was determined and was documented on the superficial radiotherapy prescription form.  A treatment calculation was also performed and documented on the treatment calculation form.  Based on the prescription, the patient was scheduled for a series of fractional treatments.
Fractions / Week: 3
Prescription Used: 1
Custom Shielding Preamble Text Will Not Be Included With Simple Simulations (.......... X X Y Cm): A lead shield of 0.762 mm thickness is utilized to form a molded, custom shield with a
Fractionation Number: 10
Treatment Time / Fractionation (Optional- Include Units): 0.33 min
Computed Treatment Time In Min (Will Render The Same As Calculated Treatment Time If Left Blank): 0.33
Additional Prescription Justification Text: If there is any interruption in treatment exceeding 5 days please see Decay and Dose Adjustment Calculation and complete treatment under Prescription 2.
Treatment Margins In Cm: 0.5
Energy (Optional-Please Include Units): 50kV
Detail Level: Detailed
Treatment Time In Min (Optional): 0.50
Intro Statement (Will Not Render If Left Blank): The patient is undergoing superficial radiation therapy for skin cancer and presents for weekly evaluation and management.  Per protocol and as documented on the flow sheet, the patient was questioned as to subjective redness, pruritus, pain, drainage, fatigue, or any other symptoms.  Objectively, the radiation area was evaluated with regards to erythema, atrophy, scale, crusting, erosion, ulceration, edema, purpura, tenderness, warmth, drainage, and any other findings.  The plan was extensively reviewed including the dose, and dosing schedule.  The simulation and clinical setup was also reviewed as was the external and any internal shields and based on this review the appropriateness and sufficiency of treatment was determined.
Treatment Device Design After Initial Simulation Justification (Will Render If Bill For Treatment Devices = Yes): The patient is status post radiation simulation and is evaluated as to the use of additional devices for shielding and placement for radiation therapy.
Total Number Of Fractions Rx 2: 15
Dose Per Fractionation In Cgy (Optional): 253.11
Custom Shielding Afterword Text Will Not Be Included With Simple Simulations (X X Y Cm............): port to correlate with the lesion size, including treatment margin. The custom lead shield is adequate to accommodate the appropriate applicator and provide adequate shielding around the treatment site. Additional shielding (as noted below) is used to protect sensitive, normal tissues.
Total Number Of Fractions: 20
Field Size (Applicator) Rx 2: 4.0 cm
Simple Simulation Preamble Text Will Be Included With Simple Simulations (.......... Indications): Simple simulation was performed today for the following reasons:
Functional Status: 1 (ambulatory, light activity)
Energy (Optional-Please Include Units) Rx 2: 50 kV
Shielding Size (Optional- Include Units): 2.0 x 2.0 cm
Shielding Size (Optional- Include Units) Rx 2: 3.0 x 2.5 cm
Cumulative Dose In Cgy (Optional): 2531.1
Patient Positioning: Sitting
Fractionation Number: 0
Daily Fractionated Dose (Optional- Include Units): 253.11 cGy
Assessment: Appropriate reaction
Day Of The Week Treatment Administered: Monday
Total Dose (Optional-Please Include Units): 5062.2 cGy

## 2017-08-02 ENCOUNTER — APPOINTMENT (OUTPATIENT)
Age: 67
Setting detail: DERMATOLOGY
End: 2017-08-03

## 2017-08-02 PROBLEM — C44.729 SQUAMOUS CELL CARCINOMA OF SKIN OF LEFT LOWER LIMB, INCLUDING HIP: Status: ACTIVE | Noted: 2017-08-02

## 2017-08-02 PROBLEM — I10 ESSENTIAL (PRIMARY) HYPERTENSION: Status: ACTIVE | Noted: 2017-08-02

## 2017-08-02 PROCEDURE — OTHER SUPERFICIAL RADIATION TREATMENT: OTHER

## 2017-08-02 PROCEDURE — OTHER TREATMENT REGIMEN: OTHER

## 2017-08-02 PROCEDURE — G6001 ECHO GUIDANCE RADIOTHERAPY: HCPCS

## 2017-08-02 PROCEDURE — 77427 RADIATION TX MANAGEMENT X5: CPT

## 2017-08-02 PROCEDURE — OTHER FOLLOW UP FOR NEXT VISIT: OTHER

## 2017-08-02 NOTE — PROCEDURE: SUPERFICIAL RADIATION TREATMENT
Treatment Time In Min (Optional): 0.33
Day Of The Week Treatment Administered: Friday
Bill For Dosimetry/Render Treatment Time Calculation In Note: No
Custom Shielding Afterword Text Will Not Be Included With Simple Simulations (X X Y Cm............): port to correlate with the lesion size, including treatment margin. The custom lead shield is adequate to accommodate the appropriate applicator and provide adequate shielding around the treatment site. Additional shielding (as noted below) is used to protect sensitive, normal tissues.
Bill And Render Text From Evaluation And Management Tab (Will Bill 31513): Yes
Shielding Size (Optional- Include Units) Rx 2: 3.0 x 2.5 cm
Treatment Margins In Cm: 0.5
Total Number Of Fractions Rx 2: 15
Detail Level: Detailed
Energy (Optional-Please Include Units) Rx 2: 50 kV
Dose / Tx In Cgy (Optional): 253.11
Dimensions-X Axis In Cm: 1
Field Size (Applicator): 2.5 cm
Daily Fractionated Dose (Optional- Include Units): 253.11 cGy
Fractions / Week Rx 2: 3
Energy (Include Units): 50kV
Additional Prescription Justification Text: If there is any interruption in treatment exceeding 5 days please see Decay and Dose Adjustment Calculation and complete treatment under Prescription 2.
Total Dose (Optional-Please Include Units): 5062.2 cGy
Custom Shielding Preamble Text Will Not Be Included With Simple Simulations (.......... X X Y Cm): A lead shield of 0.762 mm thickness is utilized to form a molded, custom shield with a
Patient Positioning: Sitting
Port Dimensions-Y Axis In Cm: 2
Shielding Size (Optional- Include Units): 2.0 x 2.0 cm
Field Size (Applicator) Rx 2: 4.0 cm
Total Number Of Fractions: 20
Simple Simulation Afterword Text Will Be Included With Simple Simulations (Indications............): The patient had a complete consultation regarding all applicable modalities for the treatment of their skin cancer and based on a variety of factors including the type of tumor, size, and location, the relevant medical history as well as local tissue factors, the functional status of the individual, the ability to perform necessary postoperative wound instructions and the need for simultaneous treatments as well as overall wound healing status, it was determined that the patient would begin radiation therapy treatment for skin cancer.  A full simulation and treatment device design was performed including the determination and formulation of appropriate simple and complex devices including lead shield of 0.762 mm thickness to form molded customized shielding to specifically correlate with the lesion size including treatment margin.  The custom lead shield is adequate to accommodate the appropriate applicator and provide adequate shielding around the treatment site.  The specific field applicator, shields, and devices both simple and complex as well as the specific patient setup is outlined below.  The patient was given a full consent for superficial radiation to both verbally and in writing and the full determination of patient's eligibility for treatment and selection is outlined on the patient eligibility and treatment selection form.  The specific superficial radiotherapy prescription was determined and was documented on the superficial radiotherapy prescription form.  A treatment calculation was also performed and documented on the treatment calculation form.  Based on the prescription, the patient was scheduled for a series of fractional treatments.
Simple Simulation Preamble Text Will Be Included With Simple Simulations (.......... Indications): Simple simulation was performed today for the following reasons:
Intro Statement (Will Not Render If Left Blank): The patient is undergoing superficial radiation therapy for skin cancer and presents for weekly evaluation and management.  Per protocol and as documented on the flow sheet, the patient was questioned as to subjective redness, pruritus, pain, drainage, fatigue, or any other symptoms.  Objectively, the radiation area was evaluated with regards to erythema, atrophy, scale, crusting, erosion, ulceration, edema, purpura, tenderness, warmth, drainage, and any other findings.  The plan was extensively reviewed including the dose, and dosing schedule.  The simulation and clinical setup was also reviewed as was the external and any internal shields and based on this review the appropriateness and sufficiency of treatment was determined.
Fractionation Number (Evaluation): 5
Treatment Device Design After Initial Simulation Justification (Will Render If Bill For Treatment Devices = Yes): The patient is status post radiation simulation and is evaluated as to the use of additional devices for shielding and placement for radiation therapy.
Treatment Time / Fractionation (Optional- Include Units): 0.33 min
Dose / Tx In Cgy (Optional): 383.5
Cumulative Dose In Cgy (Optional): 1265.55
Comments: On target
Functional Status: 1 (ambulatory, light activity)
Assessment: Appropriate reaction
Fractionation Number: 0
Time Dose Fractionation (Optional- Include Units If Applicable): 86
Treatment Time In Min (Optional): 0.50
Fractionation Number (Evaluation): 10

## 2017-08-04 ENCOUNTER — APPOINTMENT (OUTPATIENT)
Age: 67
Setting detail: DERMATOLOGY
End: 2017-08-07

## 2017-08-04 PROBLEM — C44.729 SQUAMOUS CELL CARCINOMA OF SKIN OF LEFT LOWER LIMB, INCLUDING HIP: Status: ACTIVE | Noted: 2017-08-04

## 2017-08-04 PROCEDURE — 77401 RADIATION TX DELIVERY SUPFC: CPT

## 2017-08-04 PROCEDURE — OTHER SUPERFICIAL RADIATION TREATMENT: OTHER

## 2017-08-04 PROCEDURE — OTHER FOLLOW UP FOR NEXT VISIT: OTHER

## 2017-08-04 PROCEDURE — OTHER TREATMENT REGIMEN: OTHER

## 2017-08-04 PROCEDURE — G6001 ECHO GUIDANCE RADIOTHERAPY: HCPCS

## 2017-08-04 PROCEDURE — 77280 THER RAD SIMULAJ FIELD SMPL: CPT

## 2017-08-04 NOTE — PROCEDURE: SUPERFICIAL RADIATION TREATMENT
Bill For Simulation And Treatment Device Design: No
Fractions / Week Rx 3: 5
Prescription Used: 1
Daily Fractionated Dose (Optional- Include Units): 253.11 cGy
Computed Treatment Time In Min (Will Render The Same As Calculated Treatment Time If Left Blank): 0.33
Treatment Device Design After Initial Simulation Justification (Will Render If Bill For Treatment Devices = Yes): The patient is status post radiation simulation and is evaluated as to the use of additional devices for shielding and placement for radiation therapy.
Shielding Size (Optional- Include Units): 2.0 x 2.0 cm
Shielding Size (Optional- Include Units) Rx 2: 3.0 x 2.5 cm
Total Dose (Optional-Please Include Units): 5062.2 cGy
Cumulative Dose In Cgy (Optional): 2784.21
Day Of The Week Treatment Administered: Friday
Total Number Of Fractions: 20
Functional Status: 1 (ambulatory, light activity)
Simple Simulation Preamble Text Will Be Included With Simple Simulations (.......... Indications): Simple simulation was performed today for the following reasons:
Treatment Margins In Cm: 0.5
Total Number Of Fractions Rx 3: 15
Patient Positioning: Sitting
Field Size (Applicator): 2.5 cm
Dose / Tx In Cgy (Optional): 383.5
Fractionation Number: 0
Assessment: Appropriate reaction
Energy (Optional-Please Include Units) Rx 2: 50 kV
Field Size (Applicator) Rx 2: 4.0 cm
Custom Shielding Afterword Text Will Not Be Included With Simple Simulations (X X Y Cm............): port to correlate with the lesion size, including treatment margin. The custom lead shield is adequate to accommodate the appropriate applicator and provide adequate shielding around the treatment site. Additional shielding (as noted below) is used to protect sensitive, normal tissues.
Detail Level: Detailed
Treatment Time In Min (Optional): 0.50
Time Dose Fractionation (Optional- Include Units If Applicable): 86
Port Dimensions-Y Axis In Cm: 2
Intro Statement (Will Not Render If Left Blank): The patient is undergoing superficial radiation therapy for skin cancer and presents for weekly evaluation and management.  Per protocol and as documented on the flow sheet, the patient was questioned as to subjective redness, pruritus, pain, drainage, fatigue, or any other symptoms.  Objectively, the radiation area was evaluated with regards to erythema, atrophy, scale, crusting, erosion, ulceration, edema, purpura, tenderness, warmth, drainage, and any other findings.  The plan was extensively reviewed including the dose, and dosing schedule.  The simulation and clinical setup was also reviewed as was the external and any internal shields and based on this review the appropriateness and sufficiency of treatment was determined.
Fractions / Week: 3
Initial Radiation Treatment Planning (Will Render If Bill Simulation = Yes): The patient had a complete consultation regarding all applicable modalities for the treatment of their skin cancer and based on a variety of factors including the type of tumor, size, and location, the relevant medical history as well as local tissue factors, the functional status of the individual, the ability to perform necessary postoperative wound instructions and the need for simultaneous treatments as well as overall wound healing status, it was determined that the patient would begin radiation therapy treatment for skin cancer.  A full simulation and treatment device design was performed including the determination and formulation of appropriate simple and complex devices including lead shield of 0.762 mm thickness to form molded customized shielding to specifically correlate with the lesion size including treatment margin.  The custom lead shield is adequate to accommodate the appropriate applicator and provide adequate shielding around the treatment site.  The specific field applicator, shields, and devices both simple and complex as well as the specific patient setup is outlined below.  The patient was given a full consent for superficial radiation to both verbally and in writing and the full determination of patient's eligibility for treatment and selection is outlined on the patient eligibility and treatment selection form.  The specific superficial radiotherapy prescription was determined and was documented on the superficial radiotherapy prescription form.  A treatment calculation was also performed and documented on the treatment calculation form.  Based on the prescription, the patient was scheduled for a series of fractional treatments.
Additional Prescription Justification Text: If there is any interruption in treatment exceeding 5 days please see Decay and Dose Adjustment Calculation and complete treatment under Prescription 2.
Custom Shielding Preamble Text Will Not Be Included With Simple Simulations (.......... X X Y Cm): A lead shield of 0.762 mm thickness is utilized to form a molded, custom shield with a
Treatment Time / Fractionation (Optional- Include Units): 0.33 min
Energy (Optional-Please Include Units): 50kV
Dose / Tx In Cgy (Optional): 253.11
Bill For Radiation Treatment: Yes
Fractionation Number: 11

## 2017-08-04 NOTE — PROCEDURE: TREATMENT REGIMEN
Detail Level: Zone
Plan: Per the request of Dr. Reid, Blaire Irais was seen today for Superficial Radiation Therapy requiring simulation (CPT® 82567) in preparation for treatment of specific diseased site(s). Simulation is necessary to determine correct patient and treatment portal positioning, deliver safe and effective radiation therapy. A high frequency ultrasound image was acquired prior to treatment today for three dimensional evaluation of tumor volume and response to treatment, in addition, geometric accuracy of field placement (CPT® ). Physician evaluation of the ultrasound tumor depth will be ongoing through course of treatment, and is deemed medically necessary ensuring efficacy of treatment. Today’s image and setup was evaluated determining continuation of treatment with the current plan, or necessary changes as appropriate. All appropriate custom blocking and treatment parameters verified by radiation therapist according to initial simulation.\\n\\nUS image guidance and field placement prior to treatment delivery performed. US depth is 0.0mm.\\n\\nPer Dr. Reid, continued daily US guidance and simulation is required for field placement, measurement of tumor depth, progress and edema monitoring

## 2017-08-07 ENCOUNTER — APPOINTMENT (OUTPATIENT)
Age: 67
Setting detail: DERMATOLOGY
End: 2017-08-07

## 2017-08-07 PROBLEM — C44.729 SQUAMOUS CELL CARCINOMA OF SKIN OF LEFT LOWER LIMB, INCLUDING HIP: Status: ACTIVE | Noted: 2017-08-07

## 2017-08-07 PROCEDURE — OTHER TREATMENT REGIMEN: OTHER

## 2017-08-07 PROCEDURE — 77280 THER RAD SIMULAJ FIELD SMPL: CPT

## 2017-08-07 PROCEDURE — 77401 RADIATION TX DELIVERY SUPFC: CPT

## 2017-08-07 PROCEDURE — OTHER SUPERFICIAL RADIATION TREATMENT: OTHER

## 2017-08-07 PROCEDURE — G6001 ECHO GUIDANCE RADIOTHERAPY: HCPCS

## 2017-08-07 PROCEDURE — OTHER FOLLOW UP FOR NEXT VISIT: OTHER

## 2017-08-07 NOTE — PROCEDURE: SUPERFICIAL RADIATION TREATMENT
Treatment Time In Min (Optional): 0.50
Include Rx 3 When Rendering Additional Prescriptions: No
Fractions / Week Rx 4: 5
Shielding Size (Optional- Include Units): 2.0 x 2.0 cm
Total Dose (Optional-Please Include Units): 5062.2 cGy
Total Number Of Fractions Rx 2: 15
Field Size (Applicator): 2.5 cm
Computed Treatment Time In Min (Will Render The Same As Calculated Treatment Time If Left Blank): 0.33
Bill For Radiation Treatment: Yes
Number Of Days Off Treatment: 1
Treatment Device Design After Initial Simulation Justification (Will Render If Bill For Treatment Devices = Yes): The patient is status post radiation simulation and is evaluated as to the use of additional devices for shielding and placement for radiation therapy.
Time Dose Fractionation (Optional- Include Units If Applicable): 86
Energy (Optional-Please Include Units) Rx 2: 50 kV
Daily Fractionated Dose (Optional- Include Units): 253.11 cGy
Additional Prescription Justification Text: If there is any interruption in treatment exceeding 5 days please see Decay and Dose Adjustment Calculation and complete treatment under Prescription 2.
Dose Per Fractionation In Cgy (Optional): 253.11
Cumulative Dose In Cgy (Optional): 3037.32
Patient Positioning: Sitting
Treatment Time / Fractionation (Optional- Include Units): 0.33 min
Total Number Of Fractions: 20
Custom Shielding Afterword Text Will Not Be Included With Simple Simulations (X X Y Cm............): port to correlate with the lesion size, including treatment margin. The custom lead shield is adequate to accommodate the appropriate applicator and provide adequate shielding around the treatment site. Additional shielding (as noted below) is used to protect sensitive, normal tissues.
Energy (Include Units): 50kV
Custom Shielding Preamble Text Will Not Be Included With Simple Simulations (.......... X X Y Cm): A lead shield of 0.762 mm thickness is utilized to form a molded, custom shield with a
Initial Radiation Treatment Planning (Will Render If Bill Simulation = Yes): The patient had a complete consultation regarding all applicable modalities for the treatment of their skin cancer and based on a variety of factors including the type of tumor, size, and location, the relevant medical history as well as local tissue factors, the functional status of the individual, the ability to perform necessary postoperative wound instructions and the need for simultaneous treatments as well as overall wound healing status, it was determined that the patient would begin radiation therapy treatment for skin cancer.  A full simulation and treatment device design was performed including the determination and formulation of appropriate simple and complex devices including lead shield of 0.762 mm thickness to form molded customized shielding to specifically correlate with the lesion size including treatment margin.  The custom lead shield is adequate to accommodate the appropriate applicator and provide adequate shielding around the treatment site.  The specific field applicator, shields, and devices both simple and complex as well as the specific patient setup is outlined below.  The patient was given a full consent for superficial radiation to both verbally and in writing and the full determination of patient's eligibility for treatment and selection is outlined on the patient eligibility and treatment selection form.  The specific superficial radiotherapy prescription was determined and was documented on the superficial radiotherapy prescription form.  A treatment calculation was also performed and documented on the treatment calculation form.  Based on the prescription, the patient was scheduled for a series of fractional treatments.
Fractionation Number: 0
Fractionation Number: 12
Functional Status: 1 (ambulatory, light activity)
Fractions / Week Rx 2: 3
Port Dimensions-Y Axis In Cm: 2
Treatment Margins In Cm: 0.5
Simple Simulation Preamble Text Will Be Included With Simple Simulations (.......... Indications): Simple simulation was performed today for the following reasons:
Shielding Size (Optional- Include Units) Rx 2: 3.0 x 2.5 cm
Field Size (Applicator) Rx 2: 4.0 cm
Assessment: Appropriate reaction
Detail Level: Detailed
Day Of The Week Treatment Administered: Monday
Intro Statement (Will Not Render If Left Blank): The patient is undergoing superficial radiation therapy for skin cancer and presents for weekly evaluation and management.  Per protocol and as documented on the flow sheet, the patient was questioned as to subjective redness, pruritus, pain, drainage, fatigue, or any other symptoms.  Objectively, the radiation area was evaluated with regards to erythema, atrophy, scale, crusting, erosion, ulceration, edema, purpura, tenderness, warmth, drainage, and any other findings.  The plan was extensively reviewed including the dose, and dosing schedule.  The simulation and clinical setup was also reviewed as was the external and any internal shields and based on this review the appropriateness and sufficiency of treatment was determined.
Dose / Tx In Cgy (Optional): 383.5

## 2017-08-07 NOTE — PROCEDURE: TREATMENT REGIMEN
Plan: Per the request of Dr. Reid, Blaire Irais was seen today for Superficial Radiation Therapy requiring simulation (CPT® 90183) in preparation for treatment of specific diseased site(s). Simulation is necessary to determine correct patient and treatment portal positioning, deliver safe and effective radiation therapy. A high frequency ultrasound image was acquired prior to treatment today for three dimensional evaluation of tumor volume and response to treatment, in addition, geometric accuracy of field placement (CPT® ). Physician evaluation of the ultrasound tumor depth will be ongoing through course of treatment, and is deemed medically necessary ensuring efficacy of treatment. Today’s image and setup was evaluated determining continuation of treatment with the current plan, or necessary changes as appropriate. All appropriate custom blocking and treatment parameters verified by radiation therapist according to initial simulation.\\n\\nUS image guidance and field placement prior to treatment delivery performed. US depth is 0.0mm.\\n\\nPer Dr. Reid, continued daily US guidance and simulation is required for field placement, measurement of tumor depth, progress and edema monitoring
Detail Level: Zone

## 2017-08-09 ENCOUNTER — APPOINTMENT (OUTPATIENT)
Age: 67
Setting detail: DERMATOLOGY
End: 2017-08-09

## 2017-08-09 PROBLEM — C44.729 SQUAMOUS CELL CARCINOMA OF SKIN OF LEFT LOWER LIMB, INCLUDING HIP: Status: ACTIVE | Noted: 2017-08-09

## 2017-08-09 PROCEDURE — G6001 ECHO GUIDANCE RADIOTHERAPY: HCPCS

## 2017-08-09 PROCEDURE — OTHER SUPERFICIAL RADIATION TREATMENT: OTHER

## 2017-08-09 PROCEDURE — 77401 RADIATION TX DELIVERY SUPFC: CPT

## 2017-08-09 PROCEDURE — OTHER TREATMENT REGIMEN: OTHER

## 2017-08-09 PROCEDURE — 77280 THER RAD SIMULAJ FIELD SMPL: CPT

## 2017-08-09 PROCEDURE — OTHER FOLLOW UP FOR NEXT VISIT: OTHER

## 2017-08-09 NOTE — PROCEDURE: SUPERFICIAL RADIATION TREATMENT
Fractions / Week: 3
Include Rx 3 When Rendering Additional Prescriptions: No
Number Of Treatment Days: 1
Daily Fractionated Dose (Optional- Include Units): 253.11 cGy
Fractionation Number: 0
Day Of The Week Treatment Administered: Wednesday
Fractions / Week Rx 4: 5
Treatment Margins In Cm: 0.5
Custom Shielding Preamble Text Will Not Be Included With Simple Simulations (.......... X X Y Cm): A lead shield of 0.762 mm thickness is utilized to form a molded, custom shield with a
Computed Treatment Time In Min (Will Render The Same As Calculated Treatment Time If Left Blank): 0.33
Energy (Include Units): 50kV
Energy (Optional-Please Include Units) Rx 2: 50 kV
Treatment Device Design After Initial Simulation Justification (Will Render If Bill For Treatment Devices = Yes): The patient is status post radiation simulation and is evaluated as to the use of additional devices for shielding and placement for radiation therapy.
Custom Shielding Afterword Text Will Not Be Included With Simple Simulations (X X Y Cm............): port to correlate with the lesion size, including treatment margin. The custom lead shield is adequate to accommodate the appropriate applicator and provide adequate shielding around the treatment site. Additional shielding (as noted below) is used to protect sensitive, normal tissues.
Treatment Time / Fractionation (Optional- Include Units): 0.33 min
Intro Statement (Will Not Render If Left Blank): The patient is undergoing superficial radiation therapy for skin cancer and presents for weekly evaluation and management.  Per protocol and as documented on the flow sheet, the patient was questioned as to subjective redness, pruritus, pain, drainage, fatigue, or any other symptoms.  Objectively, the radiation area was evaluated with regards to erythema, atrophy, scale, crusting, erosion, ulceration, edema, purpura, tenderness, warmth, drainage, and any other findings.  The plan was extensively reviewed including the dose, and dosing schedule.  The simulation and clinical setup was also reviewed as was the external and any internal shields and based on this review the appropriateness and sufficiency of treatment was determined.
Treatment Time In Min (Optional): 0.50
Total Number Of Fractions Rx 3: 15
Initial Radiation Treatment Planning (Will Render If Bill Simulation = Yes): The patient had a complete consultation regarding all applicable modalities for the treatment of their skin cancer and based on a variety of factors including the type of tumor, size, and location, the relevant medical history as well as local tissue factors, the functional status of the individual, the ability to perform necessary postoperative wound instructions and the need for simultaneous treatments as well as overall wound healing status, it was determined that the patient would begin radiation therapy treatment for skin cancer.  A full simulation and treatment device design was performed including the determination and formulation of appropriate simple and complex devices including lead shield of 0.762 mm thickness to form molded customized shielding to specifically correlate with the lesion size including treatment margin.  The custom lead shield is adequate to accommodate the appropriate applicator and provide adequate shielding around the treatment site.  The specific field applicator, shields, and devices both simple and complex as well as the specific patient setup is outlined below.  The patient was given a full consent for superficial radiation to both verbally and in writing and the full determination of patient's eligibility for treatment and selection is outlined on the patient eligibility and treatment selection form.  The specific superficial radiotherapy prescription was determined and was documented on the superficial radiotherapy prescription form.  A treatment calculation was also performed and documented on the treatment calculation form.  Based on the prescription, the patient was scheduled for a series of fractional treatments.
Bill For Radiation Treatment: Yes
Total Number Of Fractions: 20
Additional Prescription Justification Text: If there is any interruption in treatment exceeding 5 days please see Decay and Dose Adjustment Calculation and complete treatment under Prescription 2.
Time Dose Fractionation (Optional- Include Units If Applicable): 86
Port Dimensions-Y Axis In Cm: 2
Field Size (Applicator): 2.5 cm
Shielding Size (Optional- Include Units): 2.0 x 2.0 cm
Assessment: Appropriate reaction
Total Dose (Optional-Please Include Units): 5062.2 cGy
Shielding Size (Optional- Include Units) Rx 2: 3.0 x 2.5 cm
Detail Level: Detailed
Dose / Tx In Cgy (Optional): 253.11
Simple Simulation Preamble Text Will Be Included With Simple Simulations (.......... Indications): Simple simulation was performed today for the following reasons:
Dose / Tx In Cgy (Optional): 383.5
Cumulative Dose In Cgy (Optional): 3290.43
Patient Positioning: Sitting
Functional Status: 1 (ambulatory, light activity)
Fractionation Number: 13
Field Size (Applicator) Rx 2: 4.0 cm

## 2017-08-09 NOTE — PROCEDURE: TREATMENT REGIMEN
Plan: Per the request of Dr. Reid, Blaire Irais was seen today for Superficial Radiation Therapy requiring simulation (CPT® 54972) in preparation for treatment of specific diseased site(s). Simulation is necessary to determine correct patient and treatment portal positioning, deliver safe and effective radiation therapy. A high frequency ultrasound image was acquired prior to treatment today for three dimensional evaluation of tumor volume and response to treatment, in addition, geometric accuracy of field placement (CPT® ). Physician evaluation of the ultrasound tumor depth will be ongoing through course of treatment, and is deemed medically necessary ensuring efficacy of treatment. Today’s image and setup was evaluated determining continuation of treatment with the current plan, or necessary changes as appropriate. All appropriate custom blocking and treatment parameters verified by radiation therapist according to initial simulation.\\n\\nUS image guidance and field placement prior to treatment delivery performed. US depth is 0.0mm.\\n\\nPer Dr. Reid, continued daily US guidance and simulation is required for field placement, measurement of tumor depth, progress and edema monitoring
Detail Level: Zone

## 2017-08-11 ENCOUNTER — APPOINTMENT (OUTPATIENT)
Age: 67
Setting detail: DERMATOLOGY
End: 2017-08-11

## 2017-08-11 PROBLEM — C44.729 SQUAMOUS CELL CARCINOMA OF SKIN OF LEFT LOWER LIMB, INCLUDING HIP: Status: ACTIVE | Noted: 2017-08-11

## 2017-08-11 PROCEDURE — OTHER FOLLOW UP FOR NEXT VISIT: OTHER

## 2017-08-11 PROCEDURE — 77280 THER RAD SIMULAJ FIELD SMPL: CPT

## 2017-08-11 PROCEDURE — G6001 ECHO GUIDANCE RADIOTHERAPY: HCPCS

## 2017-08-11 PROCEDURE — 77401 RADIATION TX DELIVERY SUPFC: CPT

## 2017-08-11 PROCEDURE — OTHER SUPERFICIAL RADIATION TREATMENT: OTHER

## 2017-08-11 PROCEDURE — OTHER TREATMENT REGIMEN: OTHER

## 2017-08-11 NOTE — PROCEDURE: SUPERFICIAL RADIATION TREATMENT
Render Patient Eligibility And Selection In Note?: No
Custom Shielding Preamble Text Will Not Be Included With Simple Simulations (.......... X X Y Cm): A lead shield of 0.762 mm thickness is utilized to form a molded, custom shield with a
Field Size (Applicator): 2.5 cm
Fractions / Week: 3
Bill For Radiation Treatment: Yes
Dose / Tx In Cgy (Optional): 253.11
Additional Prescription Justification Text: If there is any interruption in treatment exceeding 5 days please see Decay and Dose Adjustment Calculation and complete treatment under Prescription 2.
Fractionation Number: 0
Assessment: Appropriate reaction
Field Size (Applicator) Rx 2: 4.0 cm
Dimensions-X Axis In Cm: 1
Fractions / Week Rx 4: 5
Shielding Size (Optional- Include Units) Rx 2: 3.0 x 2.5 cm
Shielding Size (Optional- Include Units): 2.0 x 2.0 cm
Energy (Optional-Please Include Units) Rx 2: 50 kV
Functional Status: 1 (ambulatory, light activity)
Total Dose (Optional-Please Include Units): 5062.2 cGy
Computed Treatment Time In Min (Will Render The Same As Calculated Treatment Time If Left Blank): 0.33
Custom Shielding Afterword Text Will Not Be Included With Simple Simulations (X X Y Cm............): port to correlate with the lesion size, including treatment margin. The custom lead shield is adequate to accommodate the appropriate applicator and provide adequate shielding around the treatment site. Additional shielding (as noted below) is used to protect sensitive, normal tissues.
Simple Simulation Afterword Text Will Be Included With Simple Simulations (Indications............): The patient had a complete consultation regarding all applicable modalities for the treatment of their skin cancer and based on a variety of factors including the type of tumor, size, and location, the relevant medical history as well as local tissue factors, the functional status of the individual, the ability to perform necessary postoperative wound instructions and the need for simultaneous treatments as well as overall wound healing status, it was determined that the patient would begin radiation therapy treatment for skin cancer.  A full simulation and treatment device design was performed including the determination and formulation of appropriate simple and complex devices including lead shield of 0.762 mm thickness to form molded customized shielding to specifically correlate with the lesion size including treatment margin.  The custom lead shield is adequate to accommodate the appropriate applicator and provide adequate shielding around the treatment site.  The specific field applicator, shields, and devices both simple and complex as well as the specific patient setup is outlined below.  The patient was given a full consent for superficial radiation to both verbally and in writing and the full determination of patient's eligibility for treatment and selection is outlined on the patient eligibility and treatment selection form.  The specific superficial radiotherapy prescription was determined and was documented on the superficial radiotherapy prescription form.  A treatment calculation was also performed and documented on the treatment calculation form.  Based on the prescription, the patient was scheduled for a series of fractional treatments.
Intro Statement (Will Not Render If Left Blank): The patient is undergoing superficial radiation therapy for skin cancer and presents for weekly evaluation and management.  Per protocol and as documented on the flow sheet, the patient was questioned as to subjective redness, pruritus, pain, drainage, fatigue, or any other symptoms.  Objectively, the radiation area was evaluated with regards to erythema, atrophy, scale, crusting, erosion, ulceration, edema, purpura, tenderness, warmth, drainage, and any other findings.  The plan was extensively reviewed including the dose, and dosing schedule.  The simulation and clinical setup was also reviewed as was the external and any internal shields and based on this review the appropriateness and sufficiency of treatment was determined.
Energy (Optional-Please Include Units): 50kV
Total Number Of Fractions: 20
Total Number Of Fractions Rx 2: 15
Treatment Device Design After Initial Simulation Justification (Will Render If Bill For Treatment Devices = Yes): The patient is status post radiation simulation and is evaluated as to the use of additional devices for shielding and placement for radiation therapy.
Treatment Time / Fractionation (Optional- Include Units): 0.33 min
Port Dimensions-Y Axis In Cm: 2
Time Dose Fractionation (Optional- Include Units If Applicable): 86
Day Of The Week Treatment Administered: Friday
Dose / Tx In Cgy (Optional): 383.5
Detail Level: Detailed
Cumulative Dose In Cgy (Optional): 3543.54
Simple Simulation Preamble Text Will Be Included With Simple Simulations (.......... Indications): Simple simulation was performed today for the following reasons:
Patient Positioning: Sitting
Treatment Time In Min (Optional): 0.50
Fractionation Number: 14
Treatment Margins In Cm: 0.5
Daily Fractionated Dose (Optional- Include Units): 253.11 cGy

## 2017-08-11 NOTE — PROCEDURE: TREATMENT REGIMEN
Detail Level: Zone
Plan: Per the request of Dr. Reid, Blaire Irais was seen today for Superficial Radiation Therapy requiring simulation (CPT® 30191) in preparation for treatment of specific diseased site(s). Simulation is necessary to determine correct patient and treatment portal positioning, deliver safe and effective radiation therapy. A high frequency ultrasound image was acquired prior to treatment today for three dimensional evaluation of tumor volume and response to treatment, in addition, geometric accuracy of field placement (CPT® ). Physician evaluation of the ultrasound tumor depth will be ongoing through course of treatment, and is deemed medically necessary ensuring efficacy of treatment. Today’s image and setup was evaluated determining continuation of treatment with the current plan, or necessary changes as appropriate. All appropriate custom blocking and treatment parameters verified by radiation therapist according to initial simulation.\\n\\nUS image guidance and field placement prior to treatment delivery performed. US depth is 0.0mm.\\n\\nPer Dr. Reid, continued daily US guidance and simulation is required for field placement, measurement of tumor depth, progress and edema monitoring

## 2017-08-14 ENCOUNTER — APPOINTMENT (OUTPATIENT)
Age: 67
Setting detail: DERMATOLOGY
End: 2017-08-16

## 2017-08-14 PROBLEM — C44.729 SQUAMOUS CELL CARCINOMA OF SKIN OF LEFT LOWER LIMB, INCLUDING HIP: Status: ACTIVE | Noted: 2017-08-14

## 2017-08-14 PROCEDURE — G6001 ECHO GUIDANCE RADIOTHERAPY: HCPCS

## 2017-08-14 PROCEDURE — OTHER FOLLOW UP FOR NEXT VISIT: OTHER

## 2017-08-14 PROCEDURE — 77401 RADIATION TX DELIVERY SUPFC: CPT

## 2017-08-14 PROCEDURE — OTHER SUPERFICIAL RADIATION TREATMENT: OTHER

## 2017-08-14 PROCEDURE — OTHER TREATMENT REGIMEN: OTHER

## 2017-08-14 PROCEDURE — 77280 THER RAD SIMULAJ FIELD SMPL: CPT

## 2017-08-14 NOTE — PROCEDURE: TREATMENT REGIMEN
Detail Level: Zone
Plan: Per the request of Dr. Reid, Blaire Irais was seen today for Superficial Radiation Therapy requiring simulation (CPT® 74711) in preparation for treatment of specific diseased site(s). Simulation is necessary to determine correct patient and treatment portal positioning, deliver safe and effective radiation therapy. A high frequency ultrasound image was acquired prior to treatment today for three dimensional evaluation of tumor volume and response to treatment, in addition, geometric accuracy of field placement (CPT® ). Physician evaluation of the ultrasound tumor depth will be ongoing through course of treatment, and is deemed medically necessary ensuring efficacy of treatment. Today’s image and setup was evaluated determining continuation of treatment with the current plan, or necessary changes as appropriate. All appropriate custom blocking and treatment parameters verified by radiation therapist according to initial simulation.\\n\\nUS image guidance and field placement prior to treatment delivery performed. US depth is 0.33mm.\\n\\nPer Dr. Reid, continued daily US guidance and simulation is required for field placement, measurement of tumor depth, progress and edema monitoring

## 2017-08-14 NOTE — PROCEDURE: SUPERFICIAL RADIATION TREATMENT
Treatment Margins In Cm: 0.5
Energy (Include Units): 50kV
Total Number Of Fractions Rx 2: 15
Treatment Time In Min (Optional): 0.50
Bill For Radiation Treatment: Yes
Vinh For Simulation Without Treatment Device Design (Simple Simulation): No
Energy (Optional-Please Include Units) Rx 2: 50 kV
Port Dimensions-X Axis In Cm: 2
Fractions / Week: 3
Custom Shielding Afterword Text Will Not Be Included With Simple Simulations (X X Y Cm............): port to correlate with the lesion size, including treatment margin. The custom lead shield is adequate to accommodate the appropriate applicator and provide adequate shielding around the treatment site. Additional shielding (as noted below) is used to protect sensitive, normal tissues.
Shielding Size (Optional- Include Units): 2.0 x 2.0 cm
Number Of Days Off Treatment: 1
Functional Status: 1 (ambulatory, light activity)
Patient Positioning: Sitting
Dose / Tx In Cgy (Optional): 253.11
Fractionation Number (Evaluation): 5
Total Number Of Fractions: 20
Computed Treatment Time In Min (Will Render The Same As Calculated Treatment Time If Left Blank): 0.33
Field Size (Applicator) Rx 2: 4.0 cm
Intro Statement (Will Not Render If Left Blank): The patient is undergoing superficial radiation therapy for skin cancer and presents for weekly evaluation and management.  Per protocol and as documented on the flow sheet, the patient was questioned as to subjective redness, pruritus, pain, drainage, fatigue, or any other symptoms.  Objectively, the radiation area was evaluated with regards to erythema, atrophy, scale, crusting, erosion, ulceration, edema, purpura, tenderness, warmth, drainage, and any other findings.  The plan was extensively reviewed including the dose, and dosing schedule.  The simulation and clinical setup was also reviewed as was the external and any internal shields and based on this review the appropriateness and sufficiency of treatment was determined.
Treatment Time / Fractionation (Optional- Include Units): 0.33 min
Dose / Tx In Cgy (Optional): 383.5
Field Size (Applicator): 2.5 cm
Custom Shielding Preamble Text Will Not Be Included With Simple Simulations (.......... X X Y Cm): A lead shield of 0.762 mm thickness is utilized to form a molded, custom shield with a
Daily Fractionated Dose (Optional- Include Units): 253.11 cGy
Fractionation Number: 0
Simple Simulation Preamble Text Will Be Included With Simple Simulations (.......... Indications): Simple simulation was performed today for the following reasons:
Detail Level: Detailed
Day Of The Week Treatment Administered: Monday
Simple Simulation Afterword Text Will Be Included With Simple Simulations (Indications............): The patient had a complete consultation regarding all applicable modalities for the treatment of their skin cancer and based on a variety of factors including the type of tumor, size, and location, the relevant medical history as well as local tissue factors, the functional status of the individual, the ability to perform necessary postoperative wound instructions and the need for simultaneous treatments as well as overall wound healing status, it was determined that the patient would begin radiation therapy treatment for skin cancer.  A full simulation and treatment device design was performed including the determination and formulation of appropriate simple and complex devices including lead shield of 0.762 mm thickness to form molded customized shielding to specifically correlate with the lesion size including treatment margin.  The custom lead shield is adequate to accommodate the appropriate applicator and provide adequate shielding around the treatment site.  The specific field applicator, shields, and devices both simple and complex as well as the specific patient setup is outlined below.  The patient was given a full consent for superficial radiation to both verbally and in writing and the full determination of patient's eligibility for treatment and selection is outlined on the patient eligibility and treatment selection form.  The specific superficial radiotherapy prescription was determined and was documented on the superficial radiotherapy prescription form.  A treatment calculation was also performed and documented on the treatment calculation form.  Based on the prescription, the patient was scheduled for a series of fractional treatments.
Treatment Device Design After Initial Simulation Justification (Will Render If Bill For Treatment Devices = Yes): The patient is status post radiation simulation and is evaluated as to the use of additional devices for shielding and placement for radiation therapy.
Cumulative Dose In Cgy (Optional): 3796.65
Additional Prescription Justification Text: If there is any interruption in treatment exceeding 5 days please see Decay and Dose Adjustment Calculation and complete treatment under Prescription 2.
Shielding Size (Optional- Include Units) Rx 2: 3.0 x 2.5 cm
Time Dose Fractionation (Optional- Include Units If Applicable): 86
Total Dose (Optional-Please Include Units): 5062.2 cGy
Assessment: Appropriate reaction

## 2017-08-16 ENCOUNTER — APPOINTMENT (OUTPATIENT)
Age: 67
Setting detail: DERMATOLOGY
End: 2017-08-21

## 2017-08-16 PROBLEM — C44.729 SQUAMOUS CELL CARCINOMA OF SKIN OF LEFT LOWER LIMB, INCLUDING HIP: Status: ACTIVE | Noted: 2017-08-16

## 2017-08-16 PROCEDURE — OTHER TREATMENT REGIMEN: OTHER

## 2017-08-16 PROCEDURE — OTHER FOLLOW UP FOR NEXT VISIT: OTHER

## 2017-08-16 PROCEDURE — OTHER SUPERFICIAL RADIATION TREATMENT: OTHER

## 2017-08-16 PROCEDURE — 77427 RADIATION TX MANAGEMENT X5: CPT

## 2017-08-16 PROCEDURE — G6001 ECHO GUIDANCE RADIOTHERAPY: HCPCS

## 2017-08-16 NOTE — PROCEDURE: SUPERFICIAL RADIATION TREATMENT
Energy (Optional-Please Include Units): 50kV
Fractionation Number (Evaluation): 5
Shielding Size (Optional- Include Units): 2.0 x 2.0 cm
Simple Simulation Preamble Text Will Be Included With Simple Simulations (.......... Indications): Simple simulation was performed today for the following reasons:
Bill For Radiation Treatment: Yes
Additional Prescription Justification Text: If there is any interruption in treatment exceeding 5 days please see Decay and Dose Adjustment Calculation and complete treatment under Prescription 2.
Assessment: Appropriate reaction
Number Of Treatment Days: 1
Patient Positioning: Sitting
Total Number Of Fractions Rx 4: 15
Render Additional Prescriptions In Note?: No
Total Number Of Fractions: 20
Dose / Tx In Cgy (Optional): 253.11
Energy (Optional-Please Include Units) Rx 2: 50 kV
Computed Treatment Time In Min (Will Render The Same As Calculated Treatment Time If Left Blank): 0.33
Detail Level: Detailed
Shielding Size (Optional- Include Units) Rx 2: 3.0 x 2.5 cm
Simple Simulation Afterword Text Will Be Included With Simple Simulations (Indications............): The patient had a complete consultation regarding all applicable modalities for the treatment of their skin cancer and based on a variety of factors including the type of tumor, size, and location, the relevant medical history as well as local tissue factors, the functional status of the individual, the ability to perform necessary postoperative wound instructions and the need for simultaneous treatments as well as overall wound healing status, it was determined that the patient would begin radiation therapy treatment for skin cancer.  A full simulation and treatment device design was performed including the determination and formulation of appropriate simple and complex devices including lead shield of 0.762 mm thickness to form molded customized shielding to specifically correlate with the lesion size including treatment margin.  The custom lead shield is adequate to accommodate the appropriate applicator and provide adequate shielding around the treatment site.  The specific field applicator, shields, and devices both simple and complex as well as the specific patient setup is outlined below.  The patient was given a full consent for superficial radiation to both verbally and in writing and the full determination of patient's eligibility for treatment and selection is outlined on the patient eligibility and treatment selection form.  The specific superficial radiotherapy prescription was determined and was documented on the superficial radiotherapy prescription form.  A treatment calculation was also performed and documented on the treatment calculation form.  Based on the prescription, the patient was scheduled for a series of fractional treatments.
Field Size (Applicator): 2.5 cm
Intro Statement (Will Not Render If Left Blank): The patient is undergoing superficial radiation therapy for skin cancer and presents for weekly evaluation and management.  Per protocol and as documented on the flow sheet, the patient was questioned as to subjective redness, pruritus, pain, drainage, fatigue, or any other symptoms.  Objectively, the radiation area was evaluated with regards to erythema, atrophy, scale, crusting, erosion, ulceration, edema, purpura, tenderness, warmth, drainage, and any other findings.  The plan was extensively reviewed including the dose, and dosing schedule.  The simulation and clinical setup was also reviewed as was the external and any internal shields and based on this review the appropriateness and sufficiency of treatment was determined.
Port Dimensions-Y Axis In Cm: 2
Field Size (Applicator) Rx 2: 4.0 cm
Day Of The Week Treatment Administered: Friday
Treatment Margins In Cm: 0.5
Treatment Time In Min (Optional): 0.50
Functional Status: 1 (ambulatory, light activity)
Total Dose (Optional-Please Include Units): 5062.2 cGy
Fractionation Number: 0
Treatment Device Design After Initial Simulation Justification (Will Render If Bill For Treatment Devices = Yes): The patient is status post radiation simulation and is evaluated as to the use of additional devices for shielding and placement for radiation therapy.
Time Dose Fractionation (Optional- Include Units If Applicable): 86
Treatment Time / Fractionation (Optional- Include Units): 0.33 min
Custom Shielding Preamble Text Will Not Be Included With Simple Simulations (.......... X X Y Cm): A lead shield of 0.762 mm thickness is utilized to form a molded, custom shield with a
Custom Shielding Afterword Text Will Not Be Included With Simple Simulations (X X Y Cm............): port to correlate with the lesion size, including treatment margin. The custom lead shield is adequate to accommodate the appropriate applicator and provide adequate shielding around the treatment site. Additional shielding (as noted below) is used to protect sensitive, normal tissues.
Fractions / Week Rx 2: 3
Daily Fractionated Dose (Optional- Include Units): 253.11 cGy
Cumulative Dose In Cgy (Optional): 1265.55
Fractionation Number (Evaluation): 10
Comments: On target
Dose / Tx In Cgy (Optional): 383.5

## 2017-08-18 ENCOUNTER — APPOINTMENT (OUTPATIENT)
Age: 67
Setting detail: DERMATOLOGY
End: 2017-08-21

## 2017-08-18 PROBLEM — C44.729 SQUAMOUS CELL CARCINOMA OF SKIN OF LEFT LOWER LIMB, INCLUDING HIP: Status: ACTIVE | Noted: 2017-08-18

## 2017-08-18 PROCEDURE — G6001 ECHO GUIDANCE RADIOTHERAPY: HCPCS

## 2017-08-18 PROCEDURE — OTHER FOLLOW UP FOR NEXT VISIT: OTHER

## 2017-08-18 PROCEDURE — 77401 RADIATION TX DELIVERY SUPFC: CPT

## 2017-08-18 PROCEDURE — OTHER TREATMENT REGIMEN: OTHER

## 2017-08-18 PROCEDURE — OTHER SUPERFICIAL RADIATION TREATMENT: OTHER

## 2017-08-18 PROCEDURE — 77280 THER RAD SIMULAJ FIELD SMPL: CPT

## 2017-08-18 NOTE — PROCEDURE: SUPERFICIAL RADIATION TREATMENT
Dimensions-X Axis In Cm: 1
Include Rx 2 When Rendering Additional Prescriptions: No
Shielding Size (Optional- Include Units) Rx 2: 3.0 x 2.5 cm
Energy (Optional-Please Include Units): 50kV
Port Dimensions-X Axis In Cm: 2
Simple Simulation Preamble Text Will Be Included With Simple Simulations (.......... Indications): Simple simulation was performed today for the following reasons:
Dose / Tx In Cgy (Optional): 253.11
Computed Treatment Time In Min (Will Render The Same As Calculated Treatment Time If Left Blank): 0.33
Initial Radiation Treatment Planning (Will Render If Bill Simulation = Yes): The patient had a complete consultation regarding all applicable modalities for the treatment of their skin cancer and based on a variety of factors including the type of tumor, size, and location, the relevant medical history as well as local tissue factors, the functional status of the individual, the ability to perform necessary postoperative wound instructions and the need for simultaneous treatments as well as overall wound healing status, it was determined that the patient would begin radiation therapy treatment for skin cancer.  A full simulation and treatment device design was performed including the determination and formulation of appropriate simple and complex devices including lead shield of 0.762 mm thickness to form molded customized shielding to specifically correlate with the lesion size including treatment margin.  The custom lead shield is adequate to accommodate the appropriate applicator and provide adequate shielding around the treatment site.  The specific field applicator, shields, and devices both simple and complex as well as the specific patient setup is outlined below.  The patient was given a full consent for superficial radiation to both verbally and in writing and the full determination of patient's eligibility for treatment and selection is outlined on the patient eligibility and treatment selection form.  The specific superficial radiotherapy prescription was determined and was documented on the superficial radiotherapy prescription form.  A treatment calculation was also performed and documented on the treatment calculation form.  Based on the prescription, the patient was scheduled for a series of fractional treatments.
Custom Shielding Afterword Text Will Not Be Included With Simple Simulations (X X Y Cm............): port to correlate with the lesion size, including treatment margin. The custom lead shield is adequate to accommodate the appropriate applicator and provide adequate shielding around the treatment site. Additional shielding (as noted below) is used to protect sensitive, normal tissues.
Field Size (Applicator): 2.5 cm
Cumulative Dose In Cgy (Optional): 4049.76
Custom Shielding Preamble Text Will Not Be Included With Simple Simulations (.......... X X Y Cm): A lead shield of 0.762 mm thickness is utilized to form a molded, custom shield with a
Bill For Radiation Treatment: Yes
Patient Positioning: Sitting
Fractionation Number (Evaluation): 10
Assessment: Appropriate reaction
Fractions / Week Rx 2: 3
Fractions / Week Rx 4: 5
Total Number Of Fractions: 20
Dose / Tx In Cgy (Optional): 383.5
Treatment Device Design After Initial Simulation Justification (Will Render If Bill For Treatment Devices = Yes): The patient is status post radiation simulation and is evaluated as to the use of additional devices for shielding and placement for radiation therapy.
Shielding Size (Optional- Include Units): 2.0 x 2.0 cm
Energy (Optional-Please Include Units) Rx 2: 50 kV
Detail Level: Detailed
Field Size (Applicator) Rx 2: 4.0 cm
Total Number Of Fractions Rx 4: 15
Treatment Time / Fractionation (Optional- Include Units): 0.33 min
Treatment Time In Min (Optional): 0.50
Functional Status: 1 (ambulatory, light activity)
Time Dose Fractionation (Optional- Include Units If Applicable): 86
Fractionation Number: 0
Treatment Margins In Cm: 0.5
Intro Statement (Will Not Render If Left Blank): The patient is undergoing superficial radiation therapy for skin cancer and presents for weekly evaluation and management.  Per protocol and as documented on the flow sheet, the patient was questioned as to subjective redness, pruritus, pain, drainage, fatigue, or any other symptoms.  Objectively, the radiation area was evaluated with regards to erythema, atrophy, scale, crusting, erosion, ulceration, edema, purpura, tenderness, warmth, drainage, and any other findings.  The plan was extensively reviewed including the dose, and dosing schedule.  The simulation and clinical setup was also reviewed as was the external and any internal shields and based on this review the appropriateness and sufficiency of treatment was determined.
Fractionation Number: 16
Additional Prescription Justification Text: If there is any interruption in treatment exceeding 5 days please see Decay and Dose Adjustment Calculation and complete treatment under Prescription 2.
Total Dose (Optional-Please Include Units): 5062.2 cGy
Comments: On target
Daily Fractionated Dose (Optional- Include Units): 253.11 cGy
Day Of The Week Treatment Administered: Friday

## 2017-08-18 NOTE — PROCEDURE: TREATMENT REGIMEN
Plan: Per the request of Dr. Reid, patient Irais Rudd was seen today for Superficial Radiation Therapy requiring simulation (CPT® 12655) in preparation for treatment of specific diseased site(s). Simulation is necessary to determine correct patient and treatment portal positioning, deliver safe and effective radiation therapy. A high frequency ultrasound image was acquired prior to treatment today for three dimensional evaluation of tumor volume and response to treatment, in addition, geometric accuracy of field placement (CPT®  ). Physician evaluation of the ultrasound tumor depth will be ongoing through course of treatment, and is deemed medically necessary ensuring efficacy of treatment. Today’s image and setup was evaluated determining continuation of treatment with the current plan, or necessary changes as appropriate. All appropriate custom blocking and treatment parameters verified by radiation therapist according to initial simulation. \\n \\nUS image guidance and field placement prior to treatment delivery performed. US depth is 0.0mm.\\n \\nPer Dr. Reid, continued daily US guidance and simulation is required for field placement, measurement of tumor depth, progress and edema monitoring.\\n \\n
Detail Level: Zone

## 2017-08-23 ENCOUNTER — APPOINTMENT (OUTPATIENT)
Age: 67
Setting detail: DERMATOLOGY
End: 2017-08-23

## 2017-08-23 PROBLEM — C44.729 SQUAMOUS CELL CARCINOMA OF SKIN OF LEFT LOWER LIMB, INCLUDING HIP: Status: ACTIVE | Noted: 2017-08-23

## 2017-08-23 PROCEDURE — G6001 ECHO GUIDANCE RADIOTHERAPY: HCPCS

## 2017-08-23 PROCEDURE — 77401 RADIATION TX DELIVERY SUPFC: CPT

## 2017-08-23 PROCEDURE — OTHER FOLLOW UP FOR NEXT VISIT: OTHER

## 2017-08-23 PROCEDURE — OTHER SUPERFICIAL RADIATION TREATMENT: OTHER

## 2017-08-23 PROCEDURE — 77280 THER RAD SIMULAJ FIELD SMPL: CPT

## 2017-08-23 PROCEDURE — OTHER TREATMENT REGIMEN: OTHER

## 2017-08-23 NOTE — PROCEDURE: TREATMENT REGIMEN
Detail Level: Zone
Plan: Per the request of Dr. Reid, patient Irais Rudd was seen today for Superficial Radiation Therapy requiring simulation (CPT® 08439) in preparation for treatment of specific diseased site(s). Simulation is necessary to determine correct patient and treatment portal positioning, deliver safe and effective radiation therapy. A high frequency ultrasound image was acquired prior to treatment today for three dimensional evaluation of tumor volume and response to treatment, in addition, geometric accuracy of field placement (CPT®  ). Physician evaluation of the ultrasound tumor depth will be ongoing through course of treatment, and is deemed medically necessary ensuring efficacy of treatment. Today’s image and setup was evaluated determining continuation of treatment with the current plan, or necessary changes as appropriate. All appropriate custom blocking and treatment parameters verified by radiation therapist according to initial simulation. \\n \\nUS image guidance and field placement prior to treatment delivery performed. US depth is 0.0mm.\\n \\nPer Dr. Reid, continued daily US guidance and simulation is required for field placement, measurement of tumor depth, progress and edema monitoring.\\n \\n

## 2017-08-23 NOTE — PROCEDURE: SUPERFICIAL RADIATION TREATMENT
Include Rx 2 When Rendering Additional Prescriptions: No
Total Number Of Fractions Rx 2: 15
Functional Status: 1 (ambulatory, light activity)
Patient Positioning: Sitting
Treatment Margins In Cm: 0.5
Field Size (Applicator): 2.5 cm
Assessment: Appropriate reaction
Detail Level: Detailed
Fractions / Week Rx 2: 3
Custom Shielding Preamble Text Will Not Be Included With Simple Simulations (.......... X X Y Cm): A lead shield of 0.762 mm thickness is utilized to form a molded, custom shield with a
Energy (Optional-Please Include Units): 50kV
Simple Simulation Afterword Text Will Be Included With Simple Simulations (Indications............): The patient had a complete consultation regarding all applicable modalities for the treatment of their skin cancer and based on a variety of factors including the type of tumor, size, and location, the relevant medical history as well as local tissue factors, the functional status of the individual, the ability to perform necessary postoperative wound instructions and the need for simultaneous treatments as well as overall wound healing status, it was determined that the patient would begin radiation therapy treatment for skin cancer.  A full simulation and treatment device design was performed including the determination and formulation of appropriate simple and complex devices including lead shield of 0.762 mm thickness to form molded customized shielding to specifically correlate with the lesion size including treatment margin.  The custom lead shield is adequate to accommodate the appropriate applicator and provide adequate shielding around the treatment site.  The specific field applicator, shields, and devices both simple and complex as well as the specific patient setup is outlined below.  The patient was given a full consent for superficial radiation to both verbally and in writing and the full determination of patient's eligibility for treatment and selection is outlined on the patient eligibility and treatment selection form.  The specific superficial radiotherapy prescription was determined and was documented on the superficial radiotherapy prescription form.  A treatment calculation was also performed and documented on the treatment calculation form.  Based on the prescription, the patient was scheduled for a series of fractional treatments.
Field Number: 1
Time Dose Fractionation (Optional- Include Units If Applicable): 86
Shielding Size (Optional- Include Units): 2.0 x 2.0 cm
Custom Shielding Afterword Text Will Not Be Included With Simple Simulations (X X Y Cm............): port to correlate with the lesion size, including treatment margin. The custom lead shield is adequate to accommodate the appropriate applicator and provide adequate shielding around the treatment site. Additional shielding (as noted below) is used to protect sensitive, normal tissues.
Additional Prescription Justification Text: If there is any interruption in treatment exceeding 5 days please see Decay and Dose Adjustment Calculation and complete treatment under Prescription 2.
Fractionation Number: 0
Comments: On target
Computed Treatment Time In Min (Will Render The Same As Calculated Treatment Time If Left Blank): 0.33
Bill For Radiation Treatment: Yes
Energy (Optional-Please Include Units) Rx 2: 50 kV
Fractions / Week Rx 3: 5
Port Dimensions-Y Axis In Cm: 2
Simple Simulation Preamble Text Will Be Included With Simple Simulations (.......... Indications): Simple simulation was performed today for the following reasons:
Shielding Size (Optional- Include Units) Rx 2: 3.0 x 2.5 cm
Treatment Device Design After Initial Simulation Justification (Will Render If Bill For Treatment Devices = Yes): The patient is status post radiation simulation and is evaluated as to the use of additional devices for shielding and placement for radiation therapy.
Fractionation Number: 17
Treatment Time / Fractionation (Optional- Include Units): 0.33 min
Field Size (Applicator) Rx 2: 4.0 cm
Dose Per Fractionation In Cgy (Optional): 253.11
Daily Fractionated Dose (Optional- Include Units): 253.11 cGy
Day Of The Week Treatment Administered: Wednesday
Fractionation Number (Evaluation): 10
Total Dose (Optional-Please Include Units): 5062.2 cGy
Total Number Of Fractions: 20
Dose / Tx In Cgy (Optional): 383.5
Cumulative Dose In Cgy (Optional): 4302.87
Intro Statement (Will Not Render If Left Blank): The patient is undergoing superficial radiation therapy for skin cancer and presents for weekly evaluation and management.  Per protocol and as documented on the flow sheet, the patient was questioned as to subjective redness, pruritus, pain, drainage, fatigue, or any other symptoms.  Objectively, the radiation area was evaluated with regards to erythema, atrophy, scale, crusting, erosion, ulceration, edema, purpura, tenderness, warmth, drainage, and any other findings.  The plan was extensively reviewed including the dose, and dosing schedule.  The simulation and clinical setup was also reviewed as was the external and any internal shields and based on this review the appropriateness and sufficiency of treatment was determined.
Treatment Time In Min (Optional): 0.50

## 2017-09-01 ENCOUNTER — APPOINTMENT (OUTPATIENT)
Age: 67
Setting detail: DERMATOLOGY
End: 2017-09-01

## 2017-09-01 PROBLEM — C44.729 SQUAMOUS CELL CARCINOMA OF SKIN OF LEFT LOWER LIMB, INCLUDING HIP: Status: ACTIVE | Noted: 2017-09-01

## 2017-09-01 PROCEDURE — G6001 ECHO GUIDANCE RADIOTHERAPY: HCPCS

## 2017-09-01 PROCEDURE — OTHER FOLLOW UP FOR NEXT VISIT: OTHER

## 2017-09-01 PROCEDURE — OTHER SUPERFICIAL RADIATION TREATMENT: OTHER

## 2017-09-01 PROCEDURE — 77280 THER RAD SIMULAJ FIELD SMPL: CPT

## 2017-09-01 PROCEDURE — 77401 RADIATION TX DELIVERY SUPFC: CPT

## 2017-09-01 PROCEDURE — OTHER TREATMENT REGIMEN: OTHER

## 2017-09-01 NOTE — PROCEDURE: TREATMENT REGIMEN
Detail Level: Zone
Plan: Per the request of Dr. Reid, patient Irais Rudd was seen today for Superficial Radiation Therapy requiring simulation (CPT® 85650) in preparation for treatment of specific diseased site(s). Simulation is necessary to determine correct patient and treatment portal positioning, deliver safe and effective radiation therapy. A high frequency ultrasound image was acquired prior to treatment today for three dimensional evaluation of tumor volume and response to treatment, in addition, geometric accuracy of field placement (CPT®  ). Physician evaluation of the ultrasound tumor depth will be ongoing through course of treatment, and is deemed medically necessary ensuring efficacy of treatment. Today’s image and setup was evaluated determining continuation of treatment with the current plan, or necessary changes as appropriate. All appropriate custom blocking and treatment parameters verified by radiation therapist according to initial simulation. \\n \\nUS image guidance and field placement prior to treatment delivery performed. US depth is 0.0mm.\\n \\nPer Dr. Reid, continued daily US guidance and simulation is required for field placement, measurement of tumor depth, progress and edema monitoring.\\n \\n

## 2017-09-01 NOTE — PROCEDURE: SUPERFICIAL RADIATION TREATMENT
Patient Positioning: Sitting
Functional Status: 1 (ambulatory, light activity)
Bill For Dosimetry/Render Decay And Dose Adjustment Calculation In Note: No
Detail Level: Detailed
Port Dimensions-X Axis In Cm: 2
Fractions / Week Rx 3: 5
Treatment Device Design After Initial Simulation Justification (Will Render If Bill For Treatment Devices = Yes): The patient is status post radiation simulation and is evaluated as to the use of additional devices for shielding and placement for radiation therapy.
Dimensions-Y Axis In Cm: 1
Treatment Time In Min (Optional): 0.50
Fractionation Number (Evaluation): 10
Intro Statement (Will Not Render If Left Blank): The patient is undergoing superficial radiation therapy for skin cancer and presents for weekly evaluation and management.  Per protocol and as documented on the flow sheet, the patient was questioned as to subjective redness, pruritus, pain, drainage, fatigue, or any other symptoms.  Objectively, the radiation area was evaluated with regards to erythema, atrophy, scale, crusting, erosion, ulceration, edema, purpura, tenderness, warmth, drainage, and any other findings.  The plan was extensively reviewed including the dose, and dosing schedule.  The simulation and clinical setup was also reviewed as was the external and any internal shields and based on this review the appropriateness and sufficiency of treatment was determined.
Field Size (Applicator): 2.5 cm
Custom Shielding Afterword Text Will Not Be Included With Simple Simulations (X X Y Cm............): port to correlate with the lesion size, including treatment margin. The custom lead shield is adequate to accommodate the appropriate applicator and provide adequate shielding around the treatment site. Additional shielding (as noted below) is used to protect sensitive, normal tissues.
Dose / Tx In Cgy (Optional): 383.5
Total Number Of Fractions Rx 2: 15
Energy (Optional-Please Include Units): 50kV
Comments: On target
Fractionation Number: 18
Treatment Margins In Cm: 0.5
Assessment: Appropriate reaction
Dose Per Fractionation In Cgy (Optional): 253.11
Shielding Size (Optional- Include Units): 2.0 x 2.0 cm
Simple Simulation Afterword Text Will Be Included With Simple Simulations (Indications............): The patient had a complete consultation regarding all applicable modalities for the treatment of their skin cancer and based on a variety of factors including the type of tumor, size, and location, the relevant medical history as well as local tissue factors, the functional status of the individual, the ability to perform necessary postoperative wound instructions and the need for simultaneous treatments as well as overall wound healing status, it was determined that the patient would begin radiation therapy treatment for skin cancer.  A full simulation and treatment device design was performed including the determination and formulation of appropriate simple and complex devices including lead shield of 0.762 mm thickness to form molded customized shielding to specifically correlate with the lesion size including treatment margin.  The custom lead shield is adequate to accommodate the appropriate applicator and provide adequate shielding around the treatment site.  The specific field applicator, shields, and devices both simple and complex as well as the specific patient setup is outlined below.  The patient was given a full consent for superficial radiation to both verbally and in writing and the full determination of patient's eligibility for treatment and selection is outlined on the patient eligibility and treatment selection form.  The specific superficial radiotherapy prescription was determined and was documented on the superficial radiotherapy prescription form.  A treatment calculation was also performed and documented on the treatment calculation form.  Based on the prescription, the patient was scheduled for a series of fractional treatments.
Treatment Time In Min (Optional): 0.33
Total Dose (Optional-Please Include Units): 5062.2 cGy
Total Number Of Fractions: 20
Bill For Radiation Treatment: Yes
Field Size (Applicator) Rx 2: 4.0 cm
Fractions / Week: 3
Energy (Optional-Please Include Units) Rx 2: 50 kV
Daily Fractionated Dose (Optional- Include Units): 253.11 cGy
Cumulative Dose In Cgy (Optional): 4555.98
Simple Simulation Preamble Text Will Be Included With Simple Simulations (.......... Indications): Simple simulation was performed today for the following reasons:
Custom Shielding Preamble Text Will Not Be Included With Simple Simulations (.......... X X Y Cm): A lead shield of 0.762 mm thickness is utilized to form a molded, custom shield with a
Shielding Size (Optional- Include Units) Rx 2: 3.0 x 2.5 cm
Time Dose Fractionation (Optional- Include Units If Applicable): 86
Additional Prescription Justification Text: If there is any interruption in treatment exceeding 5 days please see Decay and Dose Adjustment Calculation and complete treatment under Prescription 2.
Fractionation Number: 0
Treatment Time / Fractionation (Optional- Include Units): 0.33 min
Day Of The Week Treatment Administered: Friday

## 2017-09-06 ENCOUNTER — APPOINTMENT (OUTPATIENT)
Age: 67
Setting detail: DERMATOLOGY
End: 2017-09-06

## 2017-09-06 PROBLEM — C44.729 SQUAMOUS CELL CARCINOMA OF SKIN OF LEFT LOWER LIMB, INCLUDING HIP: Status: ACTIVE | Noted: 2017-09-06

## 2017-09-06 PROCEDURE — OTHER FOLLOW UP FOR NEXT VISIT: OTHER

## 2017-09-06 PROCEDURE — OTHER SUPERFICIAL RADIATION TREATMENT: OTHER

## 2017-09-06 PROCEDURE — 77401 RADIATION TX DELIVERY SUPFC: CPT

## 2017-09-06 PROCEDURE — OTHER TREATMENT REGIMEN: OTHER

## 2017-09-06 PROCEDURE — G6001 ECHO GUIDANCE RADIOTHERAPY: HCPCS

## 2017-09-06 PROCEDURE — 77280 THER RAD SIMULAJ FIELD SMPL: CPT

## 2017-09-06 NOTE — PROCEDURE: SUPERFICIAL RADIATION TREATMENT
Daily Fractionated Dose (Optional- Include Units): 253.11 cGy
Fractions / Week Rx 3: 5
Total Number Of Fractions Rx 3: 15
Energy (Optional-Please Include Units): 50kV
Custom Shielding Preamble Text Will Not Be Included With Simple Simulations (.......... X X Y Cm): A lead shield of 0.762 mm thickness is utilized to form a molded, custom shield with a
Treatment Margins In Cm: 0.5
Include Rx 3 When Rendering Additional Prescriptions: No
Energy (Optional-Please Include Units) Rx 2: 50 kV
Treatment Device Design After Initial Simulation Justification (Will Render If Bill For Treatment Devices = Yes): The patient is status post radiation simulation and is evaluated as to the use of additional devices for shielding and placement for radiation therapy.
Functional Status: 1 (ambulatory, light activity)
Custom Shielding Afterword Text Will Not Be Included With Simple Simulations (X X Y Cm............): port to correlate with the lesion size, including treatment margin. The custom lead shield is adequate to accommodate the appropriate applicator and provide adequate shielding around the treatment site. Additional shielding (as noted below) is used to protect sensitive, normal tissues.
Dose / Tx In Cgy (Optional): 383.5
Prescription Used: 1
Port Dimensions-Y Axis In Cm: 2
Time Dose Fractionation (Optional- Include Units If Applicable): 86
Shielding Size (Optional- Include Units): 2.0 x 2.0 cm
Field Size (Applicator): 2.5 cm
Bill For Radiation Treatment: Yes
Intro Statement (Will Not Render If Left Blank): The patient is undergoing superficial radiation therapy for skin cancer and presents for weekly evaluation and management.  Per protocol and as documented on the flow sheet, the patient was questioned as to subjective redness, pruritus, pain, drainage, fatigue, or any other symptoms.  Objectively, the radiation area was evaluated with regards to erythema, atrophy, scale, crusting, erosion, ulceration, edema, purpura, tenderness, warmth, drainage, and any other findings.  The plan was extensively reviewed including the dose, and dosing schedule.  The simulation and clinical setup was also reviewed as was the external and any internal shields and based on this review the appropriateness and sufficiency of treatment was determined.
Dose Per Fractionation In Cgy (Optional): 253.11
Initial Radiation Treatment Planning (Will Render If Bill Simulation = Yes): The patient had a complete consultation regarding all applicable modalities for the treatment of their skin cancer and based on a variety of factors including the type of tumor, size, and location, the relevant medical history as well as local tissue factors, the functional status of the individual, the ability to perform necessary postoperative wound instructions and the need for simultaneous treatments as well as overall wound healing status, it was determined that the patient would begin radiation therapy treatment for skin cancer.  A full simulation and treatment device design was performed including the determination and formulation of appropriate simple and complex devices including lead shield of 0.762 mm thickness to form molded customized shielding to specifically correlate with the lesion size including treatment margin.  The custom lead shield is adequate to accommodate the appropriate applicator and provide adequate shielding around the treatment site.  The specific field applicator, shields, and devices both simple and complex as well as the specific patient setup is outlined below.  The patient was given a full consent for superficial radiation to both verbally and in writing and the full determination of patient's eligibility for treatment and selection is outlined on the patient eligibility and treatment selection form.  The specific superficial radiotherapy prescription was determined and was documented on the superficial radiotherapy prescription form.  A treatment calculation was also performed and documented on the treatment calculation form.  Based on the prescription, the patient was scheduled for a series of fractional treatments.
Simple Simulation Preamble Text Will Be Included With Simple Simulations (.......... Indications): Simple simulation was performed today for the following reasons:
Total Number Of Fractions: 20
Fractions / Week: 3
Shielding Size (Optional- Include Units) Rx 2: 3.0 x 2.5 cm
Field Size (Applicator) Rx 2: 4.0 cm
Total Dose (Optional-Please Include Units): 5062.2 cGy
Assessment: Appropriate reaction
Patient Positioning: Sitting
Detail Level: Detailed
Additional Prescription Justification Text: If there is any interruption in treatment exceeding 5 days please see Decay and Dose Adjustment Calculation and complete treatment under Prescription 2.
Treatment Time In Min (Optional): 0.33
Fractionation Number: 19
Fractionation Number (Evaluation): 10
Cumulative Dose In Cgy (Optional): 4809.09
Treatment Time / Fractionation (Optional- Include Units): 0.33 min
Comments: On target
Day Of The Week Treatment Administered: Wednesday
Fractionation Number: 0
Treatment Time In Min (Optional): 0.50

## 2017-09-06 NOTE — PROCEDURE: TREATMENT REGIMEN
Detail Level: Zone
Plan: Per the request of Dr. Reid, patient Irais Rudd was seen today for Superficial Radiation Therapy requiring simulation (CPT® 91110) in preparation for treatment of specific diseased site(s). Simulation is necessary to determine correct patient and treatment portal positioning, deliver safe and effective radiation therapy. A high frequency ultrasound image was acquired prior to treatment today for three dimensional evaluation of tumor volume and response to treatment, in addition, geometric accuracy of field placement (CPT®  ). Physician evaluation of the ultrasound tumor depth will be ongoing through course of treatment, and is deemed medically necessary ensuring efficacy of treatment. Today’s image and setup was evaluated determining continuation of treatment with the current plan, or necessary changes as appropriate. All appropriate custom blocking and treatment parameters verified by radiation therapist according to initial simulation. \\n \\nUS image guidance and field placement prior to treatment delivery performed. US depth is 0.7mm due to inflammation.\\n \\nPer Dr. Reid, continued daily US guidance and simulation is required for field placement, measurement of tumor depth, progress and edema monitoring.\\n \\n

## 2017-09-08 ENCOUNTER — APPOINTMENT (OUTPATIENT)
Age: 67
Setting detail: DERMATOLOGY
End: 2017-09-08

## 2017-09-08 PROBLEM — C44.729 SQUAMOUS CELL CARCINOMA OF SKIN OF LEFT LOWER LIMB, INCLUDING HIP: Status: ACTIVE | Noted: 2017-09-08

## 2017-09-08 PROCEDURE — G6001 ECHO GUIDANCE RADIOTHERAPY: HCPCS

## 2017-09-08 PROCEDURE — OTHER FOLLOW UP FOR NEXT VISIT: OTHER

## 2017-09-08 PROCEDURE — OTHER SUPERFICIAL RADIATION TREATMENT: OTHER

## 2017-09-08 PROCEDURE — 77280 THER RAD SIMULAJ FIELD SMPL: CPT

## 2017-09-08 PROCEDURE — 77401 RADIATION TX DELIVERY SUPFC: CPT

## 2017-09-08 PROCEDURE — OTHER TREATMENT REGIMEN: OTHER

## 2017-09-08 NOTE — PROCEDURE: SUPERFICIAL RADIATION TREATMENT
Field Size (Applicator): 2.5 cm
Intro Statement (Will Not Render If Left Blank): The patient is undergoing superficial radiation therapy for skin cancer and presents for weekly evaluation and management.  Per protocol and as documented on the flow sheet, the patient was questioned as to subjective redness, pruritus, pain, drainage, fatigue, or any other symptoms.  Objectively, the radiation area was evaluated with regards to erythema, atrophy, scale, crusting, erosion, ulceration, edema, purpura, tenderness, warmth, drainage, and any other findings.  The plan was extensively reviewed including the dose, and dosing schedule.  The simulation and clinical setup was also reviewed as was the external and any internal shields and based on this review the appropriateness and sufficiency of treatment was determined.
Detail Level: Detailed
Energy (Optional-Please Include Units) Rx 2: 50 kV
Render Patient Eligibility And Selection In Note?: No
Treatment Time / Fractionation (Optional- Include Units): 0.33 min
Dimensions-X Axis In Cm: 1
Total Number Of Fractions: 20
Daily Fractionated Dose (Optional- Include Units): 253.11 cGy
Total Number Of Fractions Rx 4: 15
Fractions / Week Rx 2: 3
Cumulative Dose In Cgy (Optional): 5062.2
Total Dose (Optional-Please Include Units): 5062.2 cGy
Functional Status: 1 (ambulatory, light activity)
Port Dimensions-Y Axis In Cm: 2
Bill For Radiation Treatment: Yes
Field Size (Applicator) Rx 2: 4.0 cm
Simple Simulation Afterword Text Will Be Included With Simple Simulations (Indications............): The patient had a complete consultation regarding all applicable modalities for the treatment of their skin cancer and based on a variety of factors including the type of tumor, size, and location, the relevant medical history as well as local tissue factors, the functional status of the individual, the ability to perform necessary postoperative wound instructions and the need for simultaneous treatments as well as overall wound healing status, it was determined that the patient would begin radiation therapy treatment for skin cancer.  A full simulation and treatment device design was performed including the determination and formulation of appropriate simple and complex devices including lead shield of 0.762 mm thickness to form molded customized shielding to specifically correlate with the lesion size including treatment margin.  The custom lead shield is adequate to accommodate the appropriate applicator and provide adequate shielding around the treatment site.  The specific field applicator, shields, and devices both simple and complex as well as the specific patient setup is outlined below.  The patient was given a full consent for superficial radiation to both verbally and in writing and the full determination of patient's eligibility for treatment and selection is outlined on the patient eligibility and treatment selection form.  The specific superficial radiotherapy prescription was determined and was documented on the superficial radiotherapy prescription form.  A treatment calculation was also performed and documented on the treatment calculation form.  Based on the prescription, the patient was scheduled for a series of fractional treatments.
Treatment Device Design After Initial Simulation Justification (Will Render If Bill For Treatment Devices = Yes): The patient is status post radiation simulation and is evaluated as to the use of additional devices for shielding and placement for radiation therapy.
Additional Prescription Justification Text: If there is any interruption in treatment exceeding 5 days please see Decay and Dose Adjustment Calculation and complete treatment under Prescription 2.
Comments: On target
Energy (Include Units): 50kV
Fractions / Week Rx 4: 5
Custom Shielding Afterword Text Will Not Be Included With Simple Simulations (X X Y Cm............): port to correlate with the lesion size, including treatment margin. The custom lead shield is adequate to accommodate the appropriate applicator and provide adequate shielding around the treatment site. Additional shielding (as noted below) is used to protect sensitive, normal tissues.
Shielding Size (Optional- Include Units): 2.0 x 2.0 cm
Treatment Time In Min (Optional): 0.33
Dose / Tx In Cgy (Optional): 253.11
Assessment: Appropriate reaction
Custom Shielding Preamble Text Will Not Be Included With Simple Simulations (.......... X X Y Cm): A lead shield of 0.762 mm thickness is utilized to form a molded, custom shield with a
Simple Simulation Preamble Text Will Be Included With Simple Simulations (.......... Indications): Simple simulation was performed today for the following reasons:
Shielding Size (Optional- Include Units) Rx 2: 3.0 x 2.5 cm
Patient Positioning: Sitting
Fractionation Number (Evaluation): 10
Day Of The Week Treatment Administered: Friday
Treatment Time In Min (Optional): 0.50
Fractionation Number: 0
Time Dose Fractionation (Optional- Include Units If Applicable): 86
Dose / Tx In Cgy (Optional): 383.5
Treatment Margins In Cm: 0.5

## 2017-09-08 NOTE — PROCEDURE: TREATMENT REGIMEN
Plan: Per the request of Dr. Reid, patient Irais Rudd was seen today for Superficial Radiation Therapy requiring simulation (CPT® 72169) in preparation for treatment of specific diseased site(s). Simulation is necessary to determine correct patient and treatment portal positioning, deliver safe and effective radiation therapy. A high frequency ultrasound image was acquired prior to treatment today for three dimensional evaluation of tumor volume and response to treatment, in addition, geometric accuracy of field placement (CPT®  ). Physician evaluation of the ultrasound tumor depth will be ongoing through course of treatment, and is deemed medically necessary ensuring efficacy of treatment. Today’s image and setup was evaluated determining continuation of treatment with the current plan, or necessary changes as appropriate. All appropriate custom blocking and treatment parameters verified by radiation therapist according to initial simulation. \\n \\nUS image guidance and field placement prior to treatment delivery performed. US depth is 0.82mm due to inflammation.\\n \\nPer Dr. Reid, continued daily US guidance and simulation is required for field placement, measurement of tumor depth, progress and edema monitoring.\\n \\n
Detail Level: Zone

## 2017-09-20 ENCOUNTER — APPOINTMENT (OUTPATIENT)
Age: 67
Setting detail: DERMATOLOGY
End: 2017-09-20

## 2017-09-20 PROBLEM — C44.729 SQUAMOUS CELL CARCINOMA OF SKIN OF LEFT LOWER LIMB, INCLUDING HIP: Status: ACTIVE | Noted: 2017-09-20

## 2017-09-20 PROCEDURE — OTHER SUPERFICIAL RADIATION TREATMENT: OTHER

## 2017-09-20 PROCEDURE — 77427 RADIATION TX MANAGEMENT X5: CPT

## 2017-09-20 PROCEDURE — OTHER TREATMENT REGIMEN: OTHER

## 2017-09-20 PROCEDURE — OTHER FOLLOW UP FOR NEXT VISIT: OTHER

## 2017-09-20 PROCEDURE — G6001 ECHO GUIDANCE RADIOTHERAPY: HCPCS

## 2017-09-20 NOTE — PROCEDURE: TREATMENT REGIMEN
LACERATION
Samples Given: CLN bodywash. May prescribe Celacyn gel if CLN is not effective.
Notification: Please note that there are new Treatment Regimen plans which have an enhanced patient education handout experience.  The plans are called Treatment Regimen (Acne), Treatment Regimen (Atopic Dermatitis), Treatment Regimen (Rosacea), Treatment Regimen (Sun Protection), Treatment Regimen (Cosmetic Products), and Treatment Regimen (Xerosis).
Detail Level: Zone
Plan: US image guidance performed. US depth is 1.30mm inflammation

## 2017-09-20 NOTE — PROCEDURE: SUPERFICIAL RADIATION TREATMENT
Render Patient Eligibility And Selection In Note?: No
Total Number Of Fractions: 20
Total Number Of Fractions Rx 3: 15
Port Dimensions-X Axis In Cm: 2
Fractions / Week Rx 4: 5
Dimensions-X Axis In Cm: 1
Field Size (Applicator): 2.5 cm
Detail Level: Detailed
Treatment Time In Min (Optional): 0.50
Shielding Size (Optional- Include Units): 2.0 x 2.0 cm
Energy (Optional-Please Include Units) Rx 2: 50 kV
Bill And Render Text From Evaluation And Management Tab (Will Bill [id number]): Yes
Patient Positioning: Sitting
Energy (Include Units): 50kV
Additional Prescription Justification Text: If there is any interruption in treatment exceeding 5 days please see Decay and Dose Adjustment Calculation and complete treatment under Prescription 2.
Treatment Margins In Cm: 0.5
Treatment Device Design After Initial Simulation Justification (Will Render If Bill For Treatment Devices = Yes): The patient is status post radiation simulation and is evaluated as to the use of additional devices for shielding and placement for radiation therapy.
Time Dose Fractionation (Optional- Include Units If Applicable): 86
Custom Shielding Afterword Text Will Not Be Included With Simple Simulations (X X Y Cm............): port to correlate with the lesion size, including treatment margin. The custom lead shield is adequate to accommodate the appropriate applicator and provide adequate shielding around the treatment site. Additional shielding (as noted below) is used to protect sensitive, normal tissues.
Daily Fractionated Dose (Optional- Include Units): 253.11 cGy
Functional Status: 1 (ambulatory, light activity)
Shielding Size (Optional- Include Units) Rx 2: 3.0 x 2.5 cm
Fractions / Week Rx 2: 3
Dose / Tx In Cgy (Optional): 383.5
Total Dose (Optional-Please Include Units): 5062.2 cGy
Custom Shielding Preamble Text Will Not Be Included With Simple Simulations (.......... X X Y Cm): A lead shield of 0.762 mm thickness is utilized to form a molded, custom shield with a
Initial Radiation Treatment Planning (Will Render If Bill Simulation = Yes): The patient had a complete consultation regarding all applicable modalities for the treatment of their skin cancer and based on a variety of factors including the type of tumor, size, and location, the relevant medical history as well as local tissue factors, the functional status of the individual, the ability to perform necessary postoperative wound instructions and the need for simultaneous treatments as well as overall wound healing status, it was determined that the patient would begin radiation therapy treatment for skin cancer.  A full simulation and treatment device design was performed including the determination and formulation of appropriate simple and complex devices including lead shield of 0.762 mm thickness to form molded customized shielding to specifically correlate with the lesion size including treatment margin.  The custom lead shield is adequate to accommodate the appropriate applicator and provide adequate shielding around the treatment site.  The specific field applicator, shields, and devices both simple and complex as well as the specific patient setup is outlined below.  The patient was given a full consent for superficial radiation to both verbally and in writing and the full determination of patient's eligibility for treatment and selection is outlined on the patient eligibility and treatment selection form.  The specific superficial radiotherapy prescription was determined and was documented on the superficial radiotherapy prescription form.  A treatment calculation was also performed and documented on the treatment calculation form.  Based on the prescription, the patient was scheduled for a series of fractional treatments.
Field Size (Applicator) Rx 2: 4.0 cm
Treatment Time / Fractionation (Optional- Include Units): 0.33 min
Dose / Tx In Cgy (Optional): 253.11
Cumulative Dose In Cgy (Optional): 1265.55
Fractionation Number: 0
Assessment: Appropriate reaction
Treatment Time In Min (Optional): 0.33
Intro Statement (Will Not Render If Left Blank): The patient is undergoing superficial radiation therapy for skin cancer and presents for weekly evaluation and management.  Per protocol and as documented on the flow sheet, the patient was questioned as to subjective redness, pruritus, pain, drainage, fatigue, or any other symptoms.  Objectively, the radiation area was evaluated with regards to erythema, atrophy, scale, crusting, erosion, ulceration, edema, purpura, tenderness, warmth, drainage, and any other findings.  The plan was extensively reviewed including the dose, and dosing schedule.  The simulation and clinical setup was also reviewed as was the external and any internal shields and based on this review the appropriateness and sufficiency of treatment was determined.
Simple Simulation Preamble Text Will Be Included With Simple Simulations (.......... Indications): Simple simulation was performed today for the following reasons:
Day Of The Week Treatment Administered: Friday

## 2018-07-18 ENCOUNTER — APPOINTMENT (OUTPATIENT)
Age: 68
Setting detail: DERMATOLOGY
End: 2018-07-18

## 2018-07-18 DIAGNOSIS — D485 NEOPLASM OF UNCERTAIN BEHAVIOR OF SKIN: ICD-10-CM

## 2018-07-18 DIAGNOSIS — Z85.828 PERSONAL HISTORY OF OTHER MALIGNANT NEOPLASM OF SKIN: ICD-10-CM

## 2018-07-18 DIAGNOSIS — L57.8 OTHER SKIN CHANGES DUE TO CHRONIC EXPOSURE TO NONIONIZING RADIATION: ICD-10-CM

## 2018-07-18 DIAGNOSIS — L40.0 PSORIASIS VULGARIS: ICD-10-CM

## 2018-07-18 PROBLEM — D48.5 NEOPLASM OF UNCERTAIN BEHAVIOR OF SKIN: Status: ACTIVE | Noted: 2018-07-18

## 2018-07-18 PROCEDURE — OTHER TREATMENT REGIMEN: OTHER

## 2018-07-18 PROCEDURE — 99213 OFFICE O/P EST LOW 20 MIN: CPT | Mod: 25

## 2018-07-18 PROCEDURE — 11100: CPT

## 2018-07-18 PROCEDURE — OTHER BIOPSY BY SHAVE METHOD: OTHER

## 2018-07-18 PROCEDURE — OTHER COUNSELING: OTHER

## 2018-07-18 PROCEDURE — 11101: CPT

## 2018-07-18 ASSESSMENT — LOCATION SIMPLE DESCRIPTION DERM
LOCATION SIMPLE: RIGHT HAND
LOCATION SIMPLE: RIGHT SHOULDER
LOCATION SIMPLE: SCALP
LOCATION SIMPLE: LEFT FOREARM
LOCATION SIMPLE: LEFT UPPER BACK
LOCATION SIMPLE: LEFT HAND
LOCATION SIMPLE: LEFT LIP

## 2018-07-18 ASSESSMENT — LOCATION ZONE DERM
LOCATION ZONE: HAND
LOCATION ZONE: SCALP
LOCATION ZONE: ARM
LOCATION ZONE: TRUNK
LOCATION ZONE: LIP

## 2018-07-18 ASSESSMENT — LOCATION DETAILED DESCRIPTION DERM
LOCATION DETAILED: RIGHT SUPERIOR PARIETAL SCALP
LOCATION DETAILED: LEFT UPPER CUTANEOUS LIP
LOCATION DETAILED: RIGHT ULNAR PALM
LOCATION DETAILED: LEFT DISTAL DORSAL FOREARM
LOCATION DETAILED: LEFT RADIAL PALM
LOCATION DETAILED: LEFT SUPERIOR UPPER BACK
LOCATION DETAILED: RIGHT POSTERIOR SHOULDER
LOCATION DETAILED: LEFT PROXIMAL DORSAL FOREARM

## 2018-07-18 NOTE — PROCEDURE: COUNSELING
Detail Level: Generalized
Quality 410: Psoriasis Clinical Response To Oral Systemic Or Biologic Medications: Psoriasis Assessment Tool NOT Documented
Quality 337: Tuberculosis Prevention For Psoriasis And Psoriatic Arthritis Patients On A Biological Immune Response Modifier: No documentation of negative or managed positive TB screen
Detail Level: Detailed
Detail Level: Zone

## 2018-07-18 NOTE — PROCEDURE: BIOPSY BY SHAVE METHOD
Size Of Lesion In Cm: 0.4
Post-Care Instructions: I reviewed with the patient in detail post-care instructions. Patient is to keep the biopsy site dry overnight, and then apply bacitracin twice daily until healed. Patient may apply hydrogen peroxide soaks to remove any crusting.
Consent: Written consent was obtained and risks were reviewed including but not limited to scarring, infection, bleeding, scabbing, incomplete removal, nerve damage and allergy to anesthesia.
Electrodesiccation Text: The wound bed was treated with electrodesiccation after the biopsy was performed.
Biopsy Method: Acu-Razor
Bill For Surgical Tray: no
Anesthesia Volume In Cc: 0.5
Detail Level: Detailed
Cryotherapy Text: The wound bed was treated with cryotherapy after the biopsy was performed.
Wound Care: Bacitracin
Silver Nitrate Text: The wound bed was treated with silver nitrate after the biopsy was performed.
Biopsy Type: H and E
Type Of Destruction Used: Curettage
Electrodesiccation And Curettage Text: The wound bed was treated with electrodesiccation and curettage after the biopsy was performed.
Depth Of Biopsy: dermis
Billing Type: Third-Party Bill
X Size Of Lesion In Cm: 0
Was A Bandage Applied: Yes
Notification Instructions: Patient will be notified of biopsy results. However, patient instructed to call the office if not contacted within 2 weeks.
Dressing: bandage
Curettage Text: The wound bed was treated with curettage after the biopsy was performed.
Hemostasis: Drysol

## 2018-07-18 NOTE — PROCEDURE: TREATMENT REGIMEN
Detail Level: Zone
Plan: Initiate scott-psor 1 solution bid to scalp , fluocinonide cream .1% bid  x skin
Action 1: Continue

## 2018-09-19 ENCOUNTER — APPOINTMENT (OUTPATIENT)
Age: 68
Setting detail: DERMATOLOGY
End: 2018-09-20

## 2018-09-19 DIAGNOSIS — L56.5 DISSEMINATED SUPERFICIAL ACTINIC POROKERATOSIS (DSAP): ICD-10-CM

## 2018-09-19 DIAGNOSIS — L57.0 ACTINIC KERATOSIS: ICD-10-CM

## 2018-09-19 PROBLEM — C44.629 SQUAMOUS CELL CARCINOMA OF SKIN OF LEFT UPPER LIMB, INCLUDING SHOULDER: Status: ACTIVE | Noted: 2018-09-19

## 2018-09-19 PROBLEM — C44.02 SQUAMOUS CELL CARCINOMA OF SKIN OF LIP: Status: ACTIVE | Noted: 2018-09-19

## 2018-09-19 PROCEDURE — OTHER LIQUID NITROGEN: OTHER

## 2018-09-19 PROCEDURE — 77334 RADIATION TREATMENT AID(S): CPT

## 2018-09-19 PROCEDURE — 99213 OFFICE O/P EST LOW 20 MIN: CPT | Mod: 25

## 2018-09-19 PROCEDURE — OTHER COUNSELING: OTHER

## 2018-09-19 PROCEDURE — 77262 THER RADIOLOGY TX PLNG INTRM: CPT

## 2018-09-19 PROCEDURE — 77300 RADIATION THERAPY DOSE PLAN: CPT

## 2018-09-19 PROCEDURE — OTHER TREATMENT REGIMEN: OTHER

## 2018-09-19 PROCEDURE — 17003 DESTRUCT PREMALG LES 2-14: CPT

## 2018-09-19 PROCEDURE — OTHER FOLLOW UP FOR NEXT VISIT: OTHER

## 2018-09-19 PROCEDURE — OTHER SUPERFICIAL RADIATION TREATMENT: OTHER

## 2018-09-19 PROCEDURE — 17000 DESTRUCT PREMALG LESION: CPT

## 2018-09-19 PROCEDURE — 77285 THER RAD SIMULAJ FIELD INTRM: CPT

## 2018-09-19 ASSESSMENT — LOCATION ZONE DERM
LOCATION ZONE: ARM
LOCATION ZONE: NECK

## 2018-09-19 ASSESSMENT — LOCATION DETAILED DESCRIPTION DERM
LOCATION DETAILED: LEFT DISTAL DORSAL FOREARM
LOCATION DETAILED: RIGHT CENTRAL LATERAL NECK

## 2018-09-19 ASSESSMENT — LOCATION SIMPLE DESCRIPTION DERM
LOCATION SIMPLE: LEFT FOREARM
LOCATION SIMPLE: NECK

## 2018-09-19 NOTE — PROCEDURE: TREATMENT REGIMEN
Plan: Per the request of Dr. Reid, patient was seen today for Superficial Radiation Therapy requiring simulation (CPT® 05460) in preparation for treatment of specific diseased site(s). Simulation is necessary to determine correct patient and treatment portal positioning, deliver safe and effective radiation therapy. Today’s setup was evaluated determining continuation of treatment with the current plan, or necessary changes as appropriate. All appropriate custom blocking and treatment parameters verified by the Radiation Therapist\\nToday’s visit is for Simulation and planning for radiation therapy.  Question were answered and concerns were address.  Patient was evaluated based on listed criteria and is a suitable candidate to begin SRT.  \\n
Detail Level: Zone

## 2018-09-19 NOTE — PROCEDURE: SUPERFICIAL RADIATION TREATMENT
Total Number Of Fractions Rx 3: 15
Shielding Size (Optional- Include Units): 2.0cm x 2.0cm
Treatment Device Design After Initial Simulation Justification (Will Render If Bill For Treatment Devices = Yes): The patient is status post radiation simulation and is evaluated as to the use of additional devices for shielding and placement for radiation therapy.
Fractions / Week Rx 4: 5
Bill And Render Text From Evaluation And Management Tab (Will Bill [id number]): No
Total Number Of Fractions: 6
Dose Per Fractionation In Cgy (Optional): 381.14
Patient Positioning: Supine
Dimensions-X Axis In Cm: 1
Time Dose Fractionation (Optional- Include Units If Applicable) Rx 2: 53
Field Size (Applicator) Rx 2: 2.5 cm
Render Patient Eligibility And Selection In Note?: Yes
Daily Fractionated Dose (Optional- Include Units): 381.14cGy
Total Dose (Optional-Please Include Units) Rx 2: 2684.5cGy
Energy (Optional-Please Include Units): 70kV
Treatment Time / Fractionation (Optional- Include Units) Rx 2: 0.50min
Intro Statement (Will Not Render If Left Blank): The patient is undergoing superficial radiation therapy for skin cancer and presents for weekly evaluation and management.  Per protocol and as documented on the flow sheet, the patient was questioned as to subjective redness, pruritus, pain, drainage, fatigue, or any other symptoms.  Objectively, the radiation area was evaluated with regards to erythema, atrophy, scale, crusting, erosion, ulceration, edema, purpura, tenderness, warmth, drainage, and any other findings.  The plan was extensively reviewed including the dose, and dosing schedule.  The simulation and clinical setup was also reviewed as was the external and any internal shields and based on this review the appropriateness and sufficiency of treatment was determined.
Dose / Tx In Cgy (Optional): 381.14 cGy
Daily Fractionated Dose (Optional- Include Units) Rx 2: 383.5cGy
Pathology Override (Pathology Will Render As Diagnosis Name If Left Blank): Superficial Squamous Cell Carcinoma
Total Number Of Fractions Rx 2: 7
Functional Status: 0 (fully active)
Fractions / Week Rx 2: 2
Computed Treatment Time In Min (Will Render The Same As Calculated Treatment Time If Left Blank): 0.59
Assessment: Appropriate reaction
Custom Shielding Afterword Text Will Not Be Included With Simple Simulations (X X Y Cm............): port to correlate with the lesion size, including treatment margin. The custom lead shield is adequate to accommodate the appropriate applicator and provide adequate shielding around the treatment site. Additional shielding (as noted below) is used to protect sensitive, normal tissues.
Treatment Time In Min (Optional): 0.59min
Custom Shielding Preamble Text Will Not Be Included With Simple Simulations (.......... X X Y Cm): A lead shield of 0.762 mm thickness is utilized to form a molded, custom shield with a
Fractionation Number: 0
Energy (Optional-Please Include Units) Rx 2: 50Kv
Total Dose (Optional-Please Include Units): 2286.84cGy
Initial Radiation Treatment Planning (Will Render If Bill Simulation = Yes): The patient had a complete consultation regarding all applicable modalities for the treatment of their skin cancer and based on a variety of factors including the type of tumor, size, and location, the relevant medical history as well as local tissue factors, the functional status of the individual, the ability to perform necessary postoperative wound instructions and the need for simultaneous treatments as well as overall wound healing status, it was determined that the patient would begin radiation therapy treatment for skin cancer.  A full simulation and treatment device design was performed including the determination and formulation of appropriate simple and complex devices including lead shield of 0.762 mm thickness to form molded customized shielding to specifically correlate with the lesion size including treatment margin.  The custom lead shield is adequate to accommodate the appropriate applicator and provide adequate shielding around the treatment site.  The specific field applicator, shields, and devices both simple and complex as well as the specific patient setup is outlined below.  The patient was given a full consent for superficial radiation to both verbally and in writing and the full determination of patient's eligibility for treatment and selection is outlined on the patient eligibility and treatment selection form.  The specific superficial radiotherapy prescription was determined and was documented on the superficial radiotherapy prescription form.  A treatment calculation was also performed and documented on the treatment calculation form.  Based on the prescription, the patient was scheduled for a series of fractional treatments.
Detail Level: Detailed
Treatment Margins In Cm: 0.5
Time Dose Fractionation (Optional- Include Units If Applicable): 45
Simple Simulation Preamble Text Will Be Included With Simple Simulations (.......... Indications): Simple simulation was performed today for the following reasons:

## 2018-10-30 ENCOUNTER — APPOINTMENT (OUTPATIENT)
Age: 68
Setting detail: DERMATOLOGY
End: 2018-11-01

## 2018-10-30 PROBLEM — C44.629 SQUAMOUS CELL CARCINOMA OF SKIN OF LEFT UPPER LIMB, INCLUDING SHOULDER: Status: ACTIVE | Noted: 2018-10-30

## 2018-10-30 PROBLEM — C44.02 SQUAMOUS CELL CARCINOMA OF SKIN OF LIP: Status: ACTIVE | Noted: 2018-10-30

## 2018-10-30 PROCEDURE — OTHER SUPERFICIAL RADIATION TREATMENT: OTHER

## 2018-10-30 PROCEDURE — OTHER TREATMENT REGIMEN: OTHER

## 2018-10-30 PROCEDURE — 77280 THER RAD SIMULAJ FIELD SMPL: CPT

## 2018-10-30 PROCEDURE — OTHER FOLLOW UP FOR NEXT VISIT: OTHER

## 2018-10-30 PROCEDURE — 77401 RADIATION TX DELIVERY SUPFC: CPT

## 2018-10-30 PROCEDURE — G6001 ECHO GUIDANCE RADIOTHERAPY: HCPCS

## 2018-10-30 NOTE — PROCEDURE: TREATMENT REGIMEN
Detail Level: Zone
Plan: Per the request of Dr Reid, Ms. Rudd has been treated today with superficial radiation therapy for non-melanoma skin cancer.   \\n \\nWritten informed consent has been previously obtained from this patient for this treatment.  This consent is documented in the patient’s chart.  The patient gave verbal consent to continue treatment today.\\n \\nThe patient was treated with a specific radiation dose and setup as prescribed by the provider listed on this visit note.  A Radiation Therapist performed administration of radiation. The treatment parameters and cumulative dose are indicated above. \\n \\nPrior to administering the radiation, the patient underwent a verification simulation defining relevant normal and abnormal target anatomy, and acquiring images and data necessary to develop optimal radiation treatment process for the patient. This process includes verification of the treatment port(s) and proper treatment positioning.  All treatment ports were photographed.  The patient’s customized lead blocking was confirmed.   Considering, superficial radiotherapy setup is a clinical setup, this requires physician and radiation therapist to clarify location interest being treated against initial images, pathology and patient anatomy. Care was taken ensuring fields treated were geometrically accurate and properly positioned using daily verification simulation.  This process is also utilized to determine if any changes are necessary.   These steps are therefore medically necessary ensuring safe and effective administration of radiation.\\n \\nA high frequency ultrasound image was acquired today for two-dimensional evaluation of the tumor volume and response to treatment, in addition to geometric accuracy of field placement. The daily field placement is separate and distinct from the initial simulation, and is an important task in providing safe administration of radiation therapy. Physician evaluation of the ultrasound tumor depth will be ongoing throughout the course of treatment, and is deemed medically necessary in order to ensure the efficacy of treatment. Today’s image was evaluated for determination of continuation of treatment with the current plan or with necessary changes as appropriate. Additionally, the use of visualization and targeted assessment allows the patient to be able to see their cancer(s) progress, encouraging patient to complete full course of radiotherapy. \\n \\nPer Dr. Reid, continued daily US guidance and clinical setup simulation is required for field placement, measurement of tumor depth, progress and acute effect monitoring.  \\n\\nLeft upper cutaneous Lip \\nUs depth is 0.66mm, Repop +++, MANDO equivocal\\n\\nLeft Proximal Dorsal Forearm\\nUs depth is 1.02mm, Repop +++, MANDO equivocal\\n

## 2018-10-30 NOTE — PROCEDURE: SUPERFICIAL RADIATION TREATMENT
Energy (Include Units): 70Kv
Total Number Of Fractions Rx 2: 7
Shielding Size (Optional- Include Units): 2.0cm x 2.0cm
Computed Treatment Time In Min (Will Render The Same As Calculated Treatment Time If Left Blank): 0.59
Treatment Device Design After Initial Simulation Justification (Will Render If Bill For Treatment Devices = Yes): The patient is status post radiation simulation and is evaluated as to the use of additional devices for shielding and placement for radiation therapy.
Fractions / Week Rx 3: 5
Number Of Treatment Days: 1
Include Rx 2 When Rendering Additional Prescriptions: Yes
Ssd In Cm (Optional): 15
Bill For Simulation And Treatment Device Design: No
Dose / Tx In Cgy (Optional): 381.14
Total Dose (Optional-Please Include Units) Rx 2: 2684.5cGy
Field Size (Applicator) Rx 2: 2.5 cm
Patient Positioning: Supine
Treatment Time / Fractionation (Optional- Include Units): 0.59Min
Detail Level: Detailed
Total Number Of Fractions: 6
Custom Shielding Preamble Text Will Not Be Included With Simple Simulations (.......... X X Y Cm): A lead shield of 0.762 mm thickness is utilized to form a molded, custom shield with a
Simple Simulation Afterword Text Will Be Included With Simple Simulations (Indications............): The patient had a complete consultation regarding all applicable modalities for the treatment of their skin cancer and based on a variety of factors including the type of tumor, size, and location, the relevant medical history as well as local tissue factors, the functional status of the individual, the ability to perform necessary postoperative wound instructions and the need for simultaneous treatments as well as overall wound healing status, it was determined that the patient would begin radiation therapy treatment for skin cancer.  A full simulation and treatment device design was performed including the determination and formulation of appropriate simple and complex devices including lead shield of 0.762 mm thickness to form molded customized shielding to specifically correlate with the lesion size including treatment margin.  The custom lead shield is adequate to accommodate the appropriate applicator and provide adequate shielding around the treatment site.  The specific field applicator, shields, and devices both simple and complex as well as the specific patient setup is outlined below.  The patient was given a full consent for superficial radiation to both verbally and in writing and the full determination of patient's eligibility for treatment and selection is outlined on the patient eligibility and treatment selection form.  The specific superficial radiotherapy prescription was determined and was documented on the superficial radiotherapy prescription form.  A treatment calculation was also performed and documented on the treatment calculation form.  Based on the prescription, the patient was scheduled for a series of fractional treatments.
Time Dose Fractionation (Optional- Include Units If Applicable) Rx 2: 53
Fractions / Week: 2
Functional Status: 0 (fully active)
Intro Statement (Will Not Render If Left Blank): The patient is undergoing superficial radiation therapy for skin cancer and presents for weekly evaluation and management.  Per protocol and as documented on the flow sheet, the patient was questioned as to subjective redness, pruritus, pain, drainage, fatigue, or any other symptoms.  Objectively, the radiation area was evaluated with regards to erythema, atrophy, scale, crusting, erosion, ulceration, edema, purpura, tenderness, warmth, drainage, and any other findings.  The plan was extensively reviewed including the dose, and dosing schedule.  The simulation and clinical setup was also reviewed as was the external and any internal shields and based on this review the appropriateness and sufficiency of treatment was determined.
Daily Fractionated Dose (Optional- Include Units) Rx 2: 383.5cGy
Treatment Margins In Cm: 0.5
Pathology Override (Pathology Will Render As Diagnosis Name If Left Blank): Superficial Squamous Cell Carcinoma
Treatment Time / Fractionation (Optional- Include Units) Rx 2: 0.50min
Assessment: Appropriate reaction
Energy (Optional-Please Include Units) Rx 2: 50Kv
Custom Shielding Afterword Text Will Not Be Included With Simple Simulations (X X Y Cm............): port to correlate with the lesion size, including treatment margin. The custom lead shield is adequate to accommodate the appropriate applicator and provide adequate shielding around the treatment site. Additional shielding (as noted below) is used to protect sensitive, normal tissues.
Day Of The Week Treatment Administered: Tuesday
Total Dose (Optional-Please Include Units): 2286.84cGy
Time Dose Fractionation (Optional- Include Units If Applicable): 45
Daily Fractionated Dose (Optional- Include Units): 381.14cGy
Simple Simulation Preamble Text Will Be Included With Simple Simulations (.......... Indications): Simple simulation was performed today for the following reasons:

## 2018-11-01 ENCOUNTER — APPOINTMENT (OUTPATIENT)
Age: 68
Setting detail: DERMATOLOGY
End: 2018-11-01

## 2018-11-01 PROBLEM — C44.629 SQUAMOUS CELL CARCINOMA OF SKIN OF LEFT UPPER LIMB, INCLUDING SHOULDER: Status: ACTIVE | Noted: 2018-11-01

## 2018-11-01 PROBLEM — C44.02 SQUAMOUS CELL CARCINOMA OF SKIN OF LIP: Status: ACTIVE | Noted: 2018-11-01

## 2018-11-01 PROCEDURE — OTHER FOLLOW UP FOR NEXT VISIT: OTHER

## 2018-11-01 PROCEDURE — 77280 THER RAD SIMULAJ FIELD SMPL: CPT

## 2018-11-01 PROCEDURE — OTHER TREATMENT REGIMEN: OTHER

## 2018-11-01 PROCEDURE — G6001 ECHO GUIDANCE RADIOTHERAPY: HCPCS

## 2018-11-01 PROCEDURE — OTHER SUPERFICIAL RADIATION TREATMENT: OTHER

## 2018-11-01 PROCEDURE — 77401 RADIATION TX DELIVERY SUPFC: CPT

## 2018-11-01 NOTE — PROCEDURE: TREATMENT REGIMEN
Plan: Per the request of Dr Reid, Ms. Rudd has been treated today with superficial radiation therapy for non-melanoma skin cancer.   \\n \\nWritten informed consent has been previously obtained from this patient for this treatment.  This consent is documented in the patient’s chart.  The patient gave verbal consent to continue treatment today.\\n \\nThe patient was treated with a specific radiation dose and setup as prescribed by the provider listed on this visit note.  A Radiation Therapist performed administration of radiation. The treatment parameters and cumulative dose are indicated above. \\n \\nPrior to administering the radiation, the patient underwent a verification simulation defining relevant normal and abnormal target anatomy, and acquiring images and data necessary to develop optimal radiation treatment process for the patient. This process includes verification of the treatment port(s) and proper treatment positioning.  All treatment ports were photographed.  The patient’s customized lead blocking was confirmed.   Considering, superficial radiotherapy setup is a clinical setup, this requires physician and radiation therapist to clarify location interest being treated against initial images, pathology and patient anatomy. Care was taken ensuring fields treated were geometrically accurate and properly positioned using daily verification simulation.  This process is also utilized to determine if any changes are necessary.   These steps are therefore medically necessary ensuring safe and effective administration of radiation.\\n \\nA high frequency ultrasound image was acquired today for two-dimensional evaluation of the tumor volume and response to treatment, in addition to geometric accuracy of field placement. The daily field placement is separate and distinct from the initial simulation, and is an important task in providing safe administration of radiation therapy. Physician evaluation of the ultrasound tumor depth will be ongoing throughout the course of treatment, and is deemed medically necessary in order to ensure the efficacy of treatment. Today’s image was evaluated for determination of continuation of treatment with the current plan or with necessary changes as appropriate. Additionally, the use of visualization and targeted assessment allows the patient to be able to see their cancer(s) progress, encouraging patient to complete full course of radiotherapy. \\n \\nPer Dr. Reid, continued daily US guidance and clinical setup simulation is required for field placement, measurement of tumor depth, progress and acute effect monitoring.  \\n\\nLeft upper cutaneous Lip \\nUs depth is 0.74mm, Repop +++, MANDO equivocal\\n\\nLeft Proximal Dorsal Forearm\\nUs depth is 1.18mm, Repop +++, MANDO equivocal\\n
Detail Level: Zone

## 2018-11-01 NOTE — PROCEDURE: SUPERFICIAL RADIATION TREATMENT
Dose / Tx In Cgy (Optional): 381.14
Treatment Time / Fractionation (Optional- Include Units): 0.59Min
Render Prescriptions In Note?: No
Energy (Optional-Please Include Units): 70kV
Detail Level: Detailed
Shielding Size (Optional- Include Units) Rx 2: 2.0cm x 2.0cm
Time Dose Fractionation (Optional- Include Units If Applicable) Rx 2: 53
Include Rx 2 When Rendering Additional Prescriptions: Yes
Fractions / Week Rx 4: 5
Fractionation Number: 2
Total Number Of Fractions Rx 3: 15
Treatment Device Design After Initial Simulation Justification (Will Render If Bill For Treatment Devices = Yes): The patient is status post radiation simulation and is evaluated as to the use of additional devices for shielding and placement for radiation therapy.
Total Number Of Fractions Rx 2: 7
Treatment Margins In Cm: 0.5
Prescription Used: 1
Initial Radiation Treatment Planning (Will Render If Bill Simulation = Yes): The patient had a complete consultation regarding all applicable modalities for the treatment of their skin cancer and based on a variety of factors including the type of tumor, size, and location, the relevant medical history as well as local tissue factors, the functional status of the individual, the ability to perform necessary postoperative wound instructions and the need for simultaneous treatments as well as overall wound healing status, it was determined that the patient would begin radiation therapy treatment for skin cancer.  A full simulation and treatment device design was performed including the determination and formulation of appropriate simple and complex devices including lead shield of 0.762 mm thickness to form molded customized shielding to specifically correlate with the lesion size including treatment margin.  The custom lead shield is adequate to accommodate the appropriate applicator and provide adequate shielding around the treatment site.  The specific field applicator, shields, and devices both simple and complex as well as the specific patient setup is outlined below.  The patient was given a full consent for superficial radiation to both verbally and in writing and the full determination of patient's eligibility for treatment and selection is outlined on the patient eligibility and treatment selection form.  The specific superficial radiotherapy prescription was determined and was documented on the superficial radiotherapy prescription form.  A treatment calculation was also performed and documented on the treatment calculation form.  Based on the prescription, the patient was scheduled for a series of fractional treatments.
Simple Simulation Preamble Text Will Be Included With Simple Simulations (.......... Indications): Simple simulation was performed today for the following reasons:
Intro Statement (Will Not Render If Left Blank): The patient is undergoing superficial radiation therapy for skin cancer and presents for weekly evaluation and management.  Per protocol and as documented on the flow sheet, the patient was questioned as to subjective redness, pruritus, pain, drainage, fatigue, or any other symptoms.  Objectively, the radiation area was evaluated with regards to erythema, atrophy, scale, crusting, erosion, ulceration, edema, purpura, tenderness, warmth, drainage, and any other findings.  The plan was extensively reviewed including the dose, and dosing schedule.  The simulation and clinical setup was also reviewed as was the external and any internal shields and based on this review the appropriateness and sufficiency of treatment was determined.
Treatment Time / Fractionation (Optional- Include Units) Rx 2: 0.50min
Field Size (Applicator) Rx 2: 2.5 cm
Total Dose (Optional-Please Include Units) Rx 2: 2684.5cGy
Total Dose (Optional-Please Include Units): 2286.84cGy
Assessment: Appropriate reaction
Custom Shielding Afterword Text Will Not Be Included With Simple Simulations (X X Y Cm............): port to correlate with the lesion size, including treatment margin. The custom lead shield is adequate to accommodate the appropriate applicator and provide adequate shielding around the treatment site. Additional shielding (as noted below) is used to protect sensitive, normal tissues.
Functional Status: 0 (fully active)
Daily Fractionated Dose (Optional- Include Units): 381.14cGy
Treatment Time In Min (Optional): 0.59
Energy (Optional-Please Include Units) Rx 2: 50Kv
Pathology Override (Pathology Will Render As Diagnosis Name If Left Blank): Superficial Squamous Cell Carcinoma
Time Dose Fractionation (Optional- Include Units If Applicable): 45
Custom Shielding Preamble Text Will Not Be Included With Simple Simulations (.......... X X Y Cm): A lead shield of 0.762 mm thickness is utilized to form a molded, custom shield with a
Patient Positioning: Supine
Day Of The Week Treatment Administered: Thursday
Daily Fractionated Dose (Optional- Include Units) Rx 2: 383.5cGy
Total Number Of Fractions: 6
Cumulative Dose In Cgy (Optional): 762.28

## 2018-11-06 ENCOUNTER — APPOINTMENT (OUTPATIENT)
Age: 68
Setting detail: DERMATOLOGY
End: 2018-11-06

## 2018-11-06 PROBLEM — C44.629 SQUAMOUS CELL CARCINOMA OF SKIN OF LEFT UPPER LIMB, INCLUDING SHOULDER: Status: ACTIVE | Noted: 2018-11-06

## 2018-11-06 PROBLEM — C44.02 SQUAMOUS CELL CARCINOMA OF SKIN OF LIP: Status: ACTIVE | Noted: 2018-11-06

## 2018-11-06 PROCEDURE — OTHER TREATMENT REGIMEN: OTHER

## 2018-11-06 PROCEDURE — 77401 RADIATION TX DELIVERY SUPFC: CPT

## 2018-11-06 PROCEDURE — 77280 THER RAD SIMULAJ FIELD SMPL: CPT

## 2018-11-06 PROCEDURE — OTHER FOLLOW UP FOR NEXT VISIT: OTHER

## 2018-11-06 PROCEDURE — G6001 ECHO GUIDANCE RADIOTHERAPY: HCPCS

## 2018-11-06 PROCEDURE — OTHER SUPERFICIAL RADIATION TREATMENT: OTHER

## 2018-11-06 NOTE — PROCEDURE: SUPERFICIAL RADIATION TREATMENT
Ssd In Cm (Optional): 15
Fractions / Week Rx 3: 5
Pathology Override (Pathology Will Render As Diagnosis Name If Left Blank): Superficial Squamous Cell Carcinoma
Total Number Of Fractions: 6
Fractions / Week: 2
Field Size (Applicator) Rx 2: 2.5 cm
Energy (Include Units): 70Kv
Assessment: Appropriate reaction
Computed Treatment Time In Min (Will Render The Same As Calculated Treatment Time If Left Blank): 0.59
Dimensions-X Axis In Cm: 1
Day Of The Week Treatment Administered: Tuesday
Include Rx 4 When Rendering Additional Prescriptions: No
Daily Fractionated Dose (Optional- Include Units) Rx 2: 383.5cGy
Total Dose (Optional-Please Include Units) Rx 2: 2684.5cGy
Daily Fractionated Dose (Optional- Include Units): 381.14cGy
Fractionation Number: 3
Time Dose Fractionation (Optional- Include Units If Applicable) Rx 2: 53
Functional Status: 0 (fully active)
Treatment Time / Fractionation (Optional- Include Units) Rx 2: 0.50min
Energy (Optional-Please Include Units) Rx 2: 50Kv
Bill For Radiation Treatment: Yes
Custom Shielding Afterword Text Will Not Be Included With Simple Simulations (X X Y Cm............): port to correlate with the lesion size, including treatment margin. The custom lead shield is adequate to accommodate the appropriate applicator and provide adequate shielding around the treatment site. Additional shielding (as noted below) is used to protect sensitive, normal tissues.
Total Dose (Optional-Please Include Units): 2286.84cGy
Shielding Size (Optional- Include Units): 2.0cm x 2.0cm
Initial Radiation Treatment Planning (Will Render If Bill Simulation = Yes): The patient had a complete consultation regarding all applicable modalities for the treatment of their skin cancer and based on a variety of factors including the type of tumor, size, and location, the relevant medical history as well as local tissue factors, the functional status of the individual, the ability to perform necessary postoperative wound instructions and the need for simultaneous treatments as well as overall wound healing status, it was determined that the patient would begin radiation therapy treatment for skin cancer.  A full simulation and treatment device design was performed including the determination and formulation of appropriate simple and complex devices including lead shield of 0.762 mm thickness to form molded customized shielding to specifically correlate with the lesion size including treatment margin.  The custom lead shield is adequate to accommodate the appropriate applicator and provide adequate shielding around the treatment site.  The specific field applicator, shields, and devices both simple and complex as well as the specific patient setup is outlined below.  The patient was given a full consent for superficial radiation to both verbally and in writing and the full determination of patient's eligibility for treatment and selection is outlined on the patient eligibility and treatment selection form.  The specific superficial radiotherapy prescription was determined and was documented on the superficial radiotherapy prescription form.  A treatment calculation was also performed and documented on the treatment calculation form.  Based on the prescription, the patient was scheduled for a series of fractional treatments.
Custom Shielding Preamble Text Will Not Be Included With Simple Simulations (.......... X X Y Cm): A lead shield of 0.762 mm thickness is utilized to form a molded, custom shield with a
Treatment Time / Fractionation (Optional- Include Units): 0.59Min
Patient Positioning: Supine
Intro Statement (Will Not Render If Left Blank): The patient is undergoing superficial radiation therapy for skin cancer and presents for weekly evaluation and management.  Per protocol and as documented on the flow sheet, the patient was questioned as to subjective redness, pruritus, pain, drainage, fatigue, or any other symptoms.  Objectively, the radiation area was evaluated with regards to erythema, atrophy, scale, crusting, erosion, ulceration, edema, purpura, tenderness, warmth, drainage, and any other findings.  The plan was extensively reviewed including the dose, and dosing schedule.  The simulation and clinical setup was also reviewed as was the external and any internal shields and based on this review the appropriateness and sufficiency of treatment was determined.
Dose Per Fractionation In Cgy (Optional): 381.14
Treatment Device Design After Initial Simulation Justification (Will Render If Bill For Treatment Devices = Yes): The patient is status post radiation simulation and is evaluated as to the use of additional devices for shielding and placement for radiation therapy.
Time Dose Fractionation (Optional- Include Units If Applicable): 45
Detail Level: Detailed
Simple Simulation Preamble Text Will Be Included With Simple Simulations (.......... Indications): Simple simulation was performed today for the following reasons:
Cumulative Dose In Cgy (Optional): 1143.42
Treatment Margins In Cm: 0.5
Total Number Of Fractions Rx 2: 7

## 2018-11-06 NOTE — PROCEDURE: TREATMENT REGIMEN
Plan: Per the request of Dr Reid, Ms. Rudd has been treated today with superficial radiation therapy for non-melanoma skin cancer.   \\n \\nWritten informed consent has been previously obtained from this patient for this treatment.  This consent is documented in the patient’s chart.  The patient gave verbal consent to continue treatment today.\\n \\nThe patient was treated with a specific radiation dose and setup as prescribed by the provider listed on this visit note.  A Radiation Therapist performed administration of radiation. The treatment parameters and cumulative dose are indicated above. \\n \\nPrior to administering the radiation, the patient underwent a verification simulation defining relevant normal and abnormal target anatomy, and acquiring images and data necessary to develop optimal radiation treatment process for the patient. This process includes verification of the treatment port(s) and proper treatment positioning.  All treatment ports were photographed.  The patient’s customized lead blocking was confirmed.   Considering, superficial radiotherapy setup is a clinical setup, this requires physician and radiation therapist to clarify location interest being treated against initial images, pathology and patient anatomy. Care was taken ensuring fields treated were geometrically accurate and properly positioned using daily verification simulation.  This process is also utilized to determine if any changes are necessary.   These steps are therefore medically necessary ensuring safe and effective administration of radiation.\\n \\nA high frequency ultrasound image was acquired today for two-dimensional evaluation of the tumor volume and response to treatment, in addition to geometric accuracy of field placement. The daily field placement is separate and distinct from the initial simulation, and is an important task in providing safe administration of radiation therapy. Physician evaluation of the ultrasound tumor depth will be ongoing throughout the course of treatment, and is deemed medically necessary in order to ensure the efficacy of treatment. Today’s image was evaluated for determination of continuation of treatment with the current plan or with necessary changes as appropriate. Additionally, the use of visualization and targeted assessment allows the patient to be able to see their cancer(s) progress, encouraging patient to complete full course of radiotherapy. \\n \\nPer Dr. Reid, continued daily US guidance and clinical setup simulation is required for field placement, measurement of tumor depth, progress and acute effect monitoring.  \\n\\nLeft upper cutaneous Lip \\nUs depth is 0.71mm, Repop +++, MANDO equivocal\\n\\nLeft Proximal Dorsal Forearm\\nUs depth is 1.02mm, Repop +++, MANDO equivocal\\n
Detail Level: Zone

## 2018-11-08 ENCOUNTER — APPOINTMENT (OUTPATIENT)
Age: 68
Setting detail: DERMATOLOGY
End: 2018-11-09

## 2018-11-08 PROBLEM — L40.0 PSORIASIS VULGARIS: Status: ACTIVE | Noted: 2018-11-08

## 2018-11-08 PROBLEM — C44.02 SQUAMOUS CELL CARCINOMA OF SKIN OF LIP: Status: ACTIVE | Noted: 2018-11-08

## 2018-11-08 PROBLEM — C44.629 SQUAMOUS CELL CARCINOMA OF SKIN OF LEFT UPPER LIMB, INCLUDING SHOULDER: Status: ACTIVE | Noted: 2018-11-08

## 2018-11-08 PROCEDURE — 77280 THER RAD SIMULAJ FIELD SMPL: CPT

## 2018-11-08 PROCEDURE — OTHER FOLLOW UP FOR NEXT VISIT: OTHER

## 2018-11-08 PROCEDURE — OTHER TREATMENT REGIMEN: OTHER

## 2018-11-08 PROCEDURE — G6001 ECHO GUIDANCE RADIOTHERAPY: HCPCS

## 2018-11-08 PROCEDURE — 77401 RADIATION TX DELIVERY SUPFC: CPT

## 2018-11-08 PROCEDURE — OTHER SUPERFICIAL RADIATION TREATMENT: OTHER

## 2018-11-08 NOTE — PROCEDURE: SUPERFICIAL RADIATION TREATMENT
Number Of Treatment Days: 1
Bill For Dosimetry/Render Treatment Time Calculation In Note: No
Day Of The Week Treatment Administered: Thursday
Dose Per Fractionation In Cgy (Optional): 381.14
Cumulative Dose In Cgy (Optional): 1524.56
Fractions / Week Rx 3: 5
Assessment: Appropriate reaction
Treatment Device Design After Initial Simulation Justification (Will Render If Bill For Treatment Devices = Yes): The patient is status post radiation simulation and is evaluated as to the use of additional devices for shielding and placement for radiation therapy.
Patient Positioning: Supine
Simple Simulation Preamble Text Will Be Included With Simple Simulations (.......... Indications): Simple simulation was performed today for the following reasons:
Field Size (Applicator): 2.5 cm
Computed Treatment Time In Min (Will Render The Same As Calculated Treatment Time If Left Blank): 0.59
Energy (Optional-Please Include Units): 70kV
Total Dose (Optional-Please Include Units) Rx 2: 2684.5cGy
Custom Shielding Afterword Text Will Not Be Included With Simple Simulations (X X Y Cm............): port to correlate with the lesion size, including treatment margin. The custom lead shield is adequate to accommodate the appropriate applicator and provide adequate shielding around the treatment site. Additional shielding (as noted below) is used to protect sensitive, normal tissues.
Port Dimensions-X Axis In Cm: 2
Time Dose Fractionation (Optional- Include Units If Applicable) Rx 2: 53
Include Rx 2 When Rendering Additional Prescriptions: Yes
Treatment Time / Fractionation (Optional- Include Units) Rx 2: 0.50min
Functional Status: 0 (fully active)
Simple Simulation Afterword Text Will Be Included With Simple Simulations (Indications............): The patient had a complete consultation regarding all applicable modalities for the treatment of their skin cancer and based on a variety of factors including the type of tumor, size, and location, the relevant medical history as well as local tissue factors, the functional status of the individual, the ability to perform necessary postoperative wound instructions and the need for simultaneous treatments as well as overall wound healing status, it was determined that the patient would begin radiation therapy treatment for skin cancer.  A full simulation and treatment device design was performed including the determination and formulation of appropriate simple and complex devices including lead shield of 0.762 mm thickness to form molded customized shielding to specifically correlate with the lesion size including treatment margin.  The custom lead shield is adequate to accommodate the appropriate applicator and provide adequate shielding around the treatment site.  The specific field applicator, shields, and devices both simple and complex as well as the specific patient setup is outlined below.  The patient was given a full consent for superficial radiation to both verbally and in writing and the full determination of patient's eligibility for treatment and selection is outlined on the patient eligibility and treatment selection form.  The specific superficial radiotherapy prescription was determined and was documented on the superficial radiotherapy prescription form.  A treatment calculation was also performed and documented on the treatment calculation form.  Based on the prescription, the patient was scheduled for a series of fractional treatments.
Pathology Override (Pathology Will Render As Diagnosis Name If Left Blank): Superficial Squamous Cell Carcinoma
Energy (Optional-Please Include Units) Rx 2: 50Kv
Shielding Size (Optional- Include Units): 2.0cm x 2.0cm
Daily Fractionated Dose (Optional- Include Units) Rx 2: 383.5cGy
Treatment Time / Fractionation (Optional- Include Units): 0.59Min
Fractionation Number: 4
Total Number Of Fractions: 6
Daily Fractionated Dose (Optional- Include Units): 381.14cGy
Total Number Of Fractions Rx 2: 7
Time Dose Fractionation (Optional- Include Units If Applicable): 45
Intro Statement (Will Not Render If Left Blank): The patient is undergoing superficial radiation therapy for skin cancer and presents for weekly evaluation and management.  Per protocol and as documented on the flow sheet, the patient was questioned as to subjective redness, pruritus, pain, drainage, fatigue, or any other symptoms.  Objectively, the radiation area was evaluated with regards to erythema, atrophy, scale, crusting, erosion, ulceration, edema, purpura, tenderness, warmth, drainage, and any other findings.  The plan was extensively reviewed including the dose, and dosing schedule.  The simulation and clinical setup was also reviewed as was the external and any internal shields and based on this review the appropriateness and sufficiency of treatment was determined.
Total Dose (Optional-Please Include Units): 2286.84cGy
Total Number Of Fractions Rx 4: 15
Custom Shielding Preamble Text Will Not Be Included With Simple Simulations (.......... X X Y Cm): A lead shield of 0.762 mm thickness is utilized to form a molded, custom shield with a
Detail Level: Detailed
Treatment Margins In Cm: 0.5

## 2018-11-08 NOTE — PROCEDURE: TREATMENT REGIMEN
Plan: Per the request of Dr Reid, Ms. Rudd has been treated today with superficial radiation therapy for non-melanoma skin cancer.   \\n \\nWritten informed consent has been previously obtained from this patient for this treatment.  This consent is documented in the patient’s chart.  The patient gave verbal consent to continue treatment today.\\n \\nThe patient was treated with a specific radiation dose and setup as prescribed by the provider listed on this visit note.  A Radiation Therapist performed administration of radiation. The treatment parameters and cumulative dose are indicated above. \\n \\nPrior to administering the radiation, the patient underwent a verification simulation defining relevant normal and abnormal target anatomy, and acquiring images and data necessary to develop optimal radiation treatment process for the patient. This process includes verification of the treatment port(s) and proper treatment positioning.  All treatment ports were photographed.  The patient’s customized lead blocking was confirmed.   Considering, superficial radiotherapy setup is a clinical setup, this requires physician and radiation therapist to clarify location interest being treated against initial images, pathology and patient anatomy. Care was taken ensuring fields treated were geometrically accurate and properly positioned using daily verification simulation.  This process is also utilized to determine if any changes are necessary.   These steps are therefore medically necessary ensuring safe and effective administration of radiation.\\n \\nA high frequency ultrasound image was acquired today for two-dimensional evaluation of the tumor volume and response to treatment, in addition to geometric accuracy of field placement. The daily field placement is separate and distinct from the initial simulation, and is an important task in providing safe administration of radiation therapy. Physician evaluation of the ultrasound tumor depth will be ongoing throughout the course of treatment, and is deemed medically necessary in order to ensure the efficacy of treatment. Today’s image was evaluated for determination of continuation of treatment with the current plan or with necessary changes as appropriate. Additionally, the use of visualization and targeted assessment allows the patient to be able to see their cancer(s) progress, encouraging patient to complete full course of radiotherapy. \\n \\nPer Dr. Reid, continued daily US guidance and clinical setup simulation is required for field placement, measurement of tumor depth, progress and acute effect monitoring.  \\n\\nLeft upper cutaneous Lip \\nUs depth is 0.84mm, Repop +++, MANDO equivocal\\n\\nLeft Proximal Dorsal Forearm\\nUs depth is 1.03mm, Repop +++, MANDO equivocal\\n
Detail Level: Zone

## 2018-11-13 ENCOUNTER — APPOINTMENT (OUTPATIENT)
Age: 68
Setting detail: DERMATOLOGY
End: 2018-11-14

## 2018-11-13 PROBLEM — C44.629 SQUAMOUS CELL CARCINOMA OF SKIN OF LEFT UPPER LIMB, INCLUDING SHOULDER: Status: ACTIVE | Noted: 2018-11-13

## 2018-11-13 PROBLEM — C44.02 SQUAMOUS CELL CARCINOMA OF SKIN OF LIP: Status: ACTIVE | Noted: 2018-11-13

## 2018-11-13 PROCEDURE — G6001 ECHO GUIDANCE RADIOTHERAPY: HCPCS

## 2018-11-13 PROCEDURE — OTHER SUPERFICIAL RADIATION TREATMENT: OTHER

## 2018-11-13 PROCEDURE — OTHER FOLLOW UP FOR NEXT VISIT: OTHER

## 2018-11-13 PROCEDURE — OTHER TREATMENT REGIMEN: OTHER

## 2018-11-13 PROCEDURE — 77280 THER RAD SIMULAJ FIELD SMPL: CPT

## 2018-11-13 PROCEDURE — 99212 OFFICE O/P EST SF 10 MIN: CPT | Mod: 25

## 2018-11-13 PROCEDURE — 77401 RADIATION TX DELIVERY SUPFC: CPT

## 2018-11-13 NOTE — PROCEDURE: TREATMENT REGIMEN
Detail Level: Zone
Plan: Per the request of Dr Reid, Ms. Rudd has been treated today with superficial radiation therapy for non-melanoma skin cancer.   \\n \\nWritten informed consent has been previously obtained from this patient for this treatment.  This consent is documented in the patient’s chart.  The patient gave verbal consent to continue treatment today.\\n \\nThe patient was treated with a specific radiation dose and setup as prescribed by the provider listed on this visit note.  A Radiation Therapist performed administration of radiation. The treatment parameters and cumulative dose are indicated above. \\n \\nPrior to administering the radiation, the patient underwent a verification simulation defining relevant normal and abnormal target anatomy, and acquiring images and data necessary to develop optimal radiation treatment process for the patient. This process includes verification of the treatment port(s) and proper treatment positioning.  All treatment ports were photographed.  The patient’s customized lead blocking was confirmed.   Considering, superficial radiotherapy setup is a clinical setup, this requires physician and radiation therapist to clarify location interest being treated against initial images, pathology and patient anatomy. Care was taken ensuring fields treated were geometrically accurate and properly positioned using daily verification simulation.  This process is also utilized to determine if any changes are necessary.   These steps are therefore medically necessary ensuring safe and effective administration of radiation.\\n \\nA high frequency ultrasound image was acquired today for two-dimensional evaluation of the tumor volume and response to treatment, in addition to geometric accuracy of field placement. The daily field placement is separate and distinct from the initial simulation, and is an important task in providing safe administration of radiation therapy. Physician evaluation of the ultrasound tumor depth will be ongoing throughout the course of treatment, and is deemed medically necessary in order to ensure the efficacy of treatment. Today’s image was evaluated for determination of continuation of treatment with the current plan or with necessary changes as appropriate. Additionally, the use of visualization and targeted assessment allows the patient to be able to see their cancer(s) progress, encouraging patient to complete full course of radiotherapy. \\n \\nPer Dr. Reid, continued daily US guidance and clinical setup simulation is required for field placement, measurement of tumor depth, progress and acute effect monitoring.  \\n\\nLeft upper cutaneous Lip \\nUs depth is 0.96mm, Repop +++, MANDO equivocal\\n\\nLeft Proximal Dorsal Forearm\\nUs depth is 0.88mm, Repop +++, MANDO equivocal\\n

## 2018-11-13 NOTE — PROCEDURE: SUPERFICIAL RADIATION TREATMENT
Render Text From Evaluation And Management Tab (Will Not Bill 25990): Yes
Energy (Optional-Please Include Units): 70kV
Energy (Optional-Please Include Units) Rx 2: 50Kv
Total Number Of Fractions Rx 4: 15
Intro Statement (Will Not Render If Left Blank): The patient is undergoing superficial radiation therapy for skin cancer and presents for weekly evaluation and management.  Per protocol and as documented on the flow sheet, the patient was questioned as to subjective redness, pruritus, pain, drainage, fatigue, or any other symptoms.  Objectively, the radiation area was evaluated with regards to erythema, atrophy, scale, crusting, erosion, ulceration, edema, purpura, tenderness, warmth, drainage, and any other findings.  The plan was extensively reviewed including the dose, and dosing schedule.  The simulation and clinical setup was also reviewed as was the external and any internal shields and based on this review the appropriateness and sufficiency of treatment was determined.
Computed Treatment Time In Min (Will Render The Same As Calculated Treatment Time If Left Blank): 0.59
Prescription Used: 1
Dose Per Fractionation In Cgy (Optional): 381.14
Treatment Margins In Cm: 0.5
Field Size (Applicator): 2.5 cm
Include Rx 3 When Rendering Additional Prescriptions: No
Custom Shielding Preamble Text Will Not Be Included With Simple Simulations (.......... X X Y Cm): A lead shield of 0.762 mm thickness is utilized to form a molded, custom shield with a
Total Dose (Optional-Please Include Units): 2286.84cGy
Fractions / Week: 2
Comments: On Target, RTOG 0 Decrease in energy from 70kv to 50kv, after fraction number 6 per prescription protocol.
Time Dose Fractionation (Optional- Include Units If Applicable): 45
Daily Fractionated Dose (Optional- Include Units) Rx 2: 383.5cGy
Pathology Override (Pathology Will Render As Diagnosis Name If Left Blank): Superficial Squamous Cell Carcinoma
Treatment Device Design After Initial Simulation Justification (Will Render If Bill For Treatment Devices = Yes): The patient is status post radiation simulation and is evaluated as to the use of additional devices for shielding and placement for radiation therapy.
Total Number Of Fractions Rx 2: 7
Cumulative Dose In Cgy (Optional): 1905.7
Initial Radiation Treatment Planning (Will Render If Bill Simulation = Yes): The patient had a complete consultation regarding all applicable modalities for the treatment of their skin cancer and based on a variety of factors including the type of tumor, size, and location, the relevant medical history as well as local tissue factors, the functional status of the individual, the ability to perform necessary postoperative wound instructions and the need for simultaneous treatments as well as overall wound healing status, it was determined that the patient would begin radiation therapy treatment for skin cancer.  A full simulation and treatment device design was performed including the determination and formulation of appropriate simple and complex devices including lead shield of 0.762 mm thickness to form molded customized shielding to specifically correlate with the lesion size including treatment margin.  The custom lead shield is adequate to accommodate the appropriate applicator and provide adequate shielding around the treatment site.  The specific field applicator, shields, and devices both simple and complex as well as the specific patient setup is outlined below.  The patient was given a full consent for superficial radiation to both verbally and in writing and the full determination of patient's eligibility for treatment and selection is outlined on the patient eligibility and treatment selection form.  The specific superficial radiotherapy prescription was determined and was documented on the superficial radiotherapy prescription form.  A treatment calculation was also performed and documented on the treatment calculation form.  Based on the prescription, the patient was scheduled for a series of fractional treatments.
Fractions / Week Rx 3: 5
Patient Positioning: Supine
Assessment: Appropriate reaction
Functional Status: 0 (fully active)
Total Dose (Optional-Please Include Units) Rx 2: 2684.5cGy
Shielding Size (Optional- Include Units) Rx 2: 2.0cm x 2.0cm
Day Of The Week Treatment Administered: Tuesday
Treatment Time / Fractionation (Optional- Include Units) Rx 2: 0.50min
Total Number Of Fractions: 6
Time Dose Fractionation (Optional- Include Units If Applicable) Rx 2: 53
Simple Simulation Preamble Text Will Be Included With Simple Simulations (.......... Indications): Simple simulation was performed today for the following reasons:
Custom Shielding Afterword Text Will Not Be Included With Simple Simulations (X X Y Cm............): port to correlate with the lesion size, including treatment margin. The custom lead shield is adequate to accommodate the appropriate applicator and provide adequate shielding around the treatment site. Additional shielding (as noted below) is used to protect sensitive, normal tissues.
Treatment Time / Fractionation (Optional- Include Units): 0.59Min
Daily Fractionated Dose (Optional- Include Units): 381.14cGy
Detail Level: Detailed

## 2018-11-15 ENCOUNTER — APPOINTMENT (OUTPATIENT)
Age: 68
Setting detail: DERMATOLOGY
End: 2018-11-16

## 2018-11-15 PROBLEM — C44.02 SQUAMOUS CELL CARCINOMA OF SKIN OF LIP: Status: ACTIVE | Noted: 2018-11-15

## 2018-11-15 PROBLEM — C44.629 SQUAMOUS CELL CARCINOMA OF SKIN OF LEFT UPPER LIMB, INCLUDING SHOULDER: Status: ACTIVE | Noted: 2018-11-15

## 2018-11-15 PROCEDURE — OTHER SUPERFICIAL RADIATION TREATMENT: OTHER

## 2018-11-15 PROCEDURE — OTHER TREATMENT REGIMEN: OTHER

## 2018-11-15 PROCEDURE — OTHER FOLLOW UP FOR NEXT VISIT: OTHER

## 2018-11-15 PROCEDURE — 77401 RADIATION TX DELIVERY SUPFC: CPT

## 2018-11-15 PROCEDURE — G6001 ECHO GUIDANCE RADIOTHERAPY: HCPCS

## 2018-11-15 PROCEDURE — 77280 THER RAD SIMULAJ FIELD SMPL: CPT

## 2018-11-15 NOTE — PROCEDURE: TREATMENT REGIMEN
Detail Level: Zone
Modify Regimen: Doses changed due to patient being treated on different machine
Plan: Per the request of Dr Reid, Ms. Rudd has been treated today with superficial radiation therapy for non-melanoma skin cancer.   \\n \\nWritten informed consent has been previously obtained from this patient for this treatment.  This consent is documented in the patient’s chart.  The patient gave verbal consent to continue treatment today.\\n \\nThe patient was treated with a specific radiation dose and setup as prescribed by the provider listed on this visit note.  A Radiation Therapist performed administration of radiation. The treatment parameters and cumulative dose are indicated above. \\n \\nPrior to administering the radiation, the patient underwent a verification simulation defining relevant normal and abnormal target anatomy, and acquiring images and data necessary to develop optimal radiation treatment process for the patient. This process includes verification of the treatment port(s) and proper treatment positioning.  All treatment ports were photographed.  The patient’s customized lead blocking was confirmed.   Considering, superficial radiotherapy setup is a clinical setup, this requires physician and radiation therapist to clarify location interest being treated against initial images, pathology and patient anatomy. Care was taken ensuring fields treated were geometrically accurate and properly positioned using daily verification simulation.  This process is also utilized to determine if any changes are necessary.   These steps are therefore medically necessary ensuring safe and effective administration of radiation.\\n \\nA high frequency ultrasound image was acquired today for two-dimensional evaluation of the tumor volume and response to treatment, in addition to geometric accuracy of field placement. The daily field placement is separate and distinct from the initial simulation, and is an important task in providing safe administration of radiation therapy. Physician evaluation of the ultrasound tumor depth will be ongoing throughout the course of treatment, and is deemed medically necessary in order to ensure the efficacy of treatment. Today’s image was evaluated for determination of continuation of treatment with the current plan or with necessary changes as appropriate. Additionally, the use of visualization and targeted assessment allows the patient to be able to see their cancer(s) progress, encouraging patient to complete full course of radiotherapy. \\n \\nPer Dr. Reid, continued daily US guidance and clinical setup simulation is required for field placement, measurement of tumor depth, progress and acute effect monitoring.  \\n\\nLeft upper cutaneous Lip \\nUs depth is 0.96mm, Repop +++, MANDO equivocal\\n\\nLeft Proximal Dorsal Forearm\\nUs depth is 1.06mm (edema), Repop +++, MANDO equivocal\\n

## 2018-11-15 NOTE — PROCEDURE: SUPERFICIAL RADIATION TREATMENT
Dose Per Fractionation In Cgy (Optional): 381.14
Vinh For Simulation Without Treatment Device Design (Simple Simulation): No
Port Dimensions-X Axis In Cm: 2
Custom Shielding Afterword Text Will Not Be Included With Simple Simulations (X X Y Cm............): port to correlate with the lesion size, including treatment margin. The custom lead shield is adequate to accommodate the appropriate applicator and provide adequate shielding around the treatment site. Additional shielding (as noted below) is used to protect sensitive, normal tissues.
Dimensions-X Axis In Cm: 1
Fractionation Number (Evaluation): 5
Computed Treatment Time In Min (Will Render The Same As Calculated Treatment Time If Left Blank): 0.59
Energy (Optional-Please Include Units): 70kV
Pathology Override (Pathology Will Render As Diagnosis Name If Left Blank): Superficial Squamous Cell Carcinoma
Time Dose Fractionation (Optional- Include Units If Applicable): 45
Total Number Of Fractions: 6
Simple Simulation Afterword Text Will Be Included With Simple Simulations (Indications............): The patient had a complete consultation regarding all applicable modalities for the treatment of their skin cancer and based on a variety of factors including the type of tumor, size, and location, the relevant medical history as well as local tissue factors, the functional status of the individual, the ability to perform necessary postoperative wound instructions and the need for simultaneous treatments as well as overall wound healing status, it was determined that the patient would begin radiation therapy treatment for skin cancer.  A full simulation and treatment device design was performed including the determination and formulation of appropriate simple and complex devices including lead shield of 0.762 mm thickness to form molded customized shielding to specifically correlate with the lesion size including treatment margin.  The custom lead shield is adequate to accommodate the appropriate applicator and provide adequate shielding around the treatment site.  The specific field applicator, shields, and devices both simple and complex as well as the specific patient setup is outlined below.  The patient was given a full consent for superficial radiation to both verbally and in writing and the full determination of patient's eligibility for treatment and selection is outlined on the patient eligibility and treatment selection form.  The specific superficial radiotherapy prescription was determined and was documented on the superficial radiotherapy prescription form.  A treatment calculation was also performed and documented on the treatment calculation form.  Based on the prescription, the patient was scheduled for a series of fractional treatments.
Assessment: Appropriate reaction
Field Size (Applicator): 2.5 cm
Shielding Size (Optional- Include Units): 2.0cm x 2.0cm
Total Number Of Fractions Rx 2: 7
Intro Statement (Will Not Render If Left Blank): The patient is undergoing superficial radiation therapy for skin cancer and presents for weekly evaluation and management.  Per protocol and as documented on the flow sheet, the patient was questioned as to subjective redness, pruritus, pain, drainage, fatigue, or any other symptoms.  Objectively, the radiation area was evaluated with regards to erythema, atrophy, scale, crusting, erosion, ulceration, edema, purpura, tenderness, warmth, drainage, and any other findings.  The plan was extensively reviewed including the dose, and dosing schedule.  The simulation and clinical setup was also reviewed as was the external and any internal shields and based on this review the appropriateness and sufficiency of treatment was determined.
Treatment Time / Fractionation (Optional- Include Units): 0.59Min
Treatment Device Design After Initial Simulation Justification (Will Render If Bill For Treatment Devices = Yes): The patient is status post radiation simulation and is evaluated as to the use of additional devices for shielding and placement for radiation therapy.
Daily Fractionated Dose (Optional- Include Units) Rx 2: 383.5cGy
Cumulative Dose In Cgy (Optional): 2289.1
Treatment Margins In Cm: 0.5
Dose / Tx In Cgy (Optional): 383.4
Total Dose (Optional-Please Include Units): 2286.84cGy
Include Rx 2 When Rendering Additional Prescriptions: Yes
Total Number Of Fractions Rx 4: 15
Total Dose (Optional-Please Include Units) Rx 2: 2684.5cGy
Energy (Optional-Please Include Units) Rx 2: 50Kv
Functional Status: 0 (fully active)
Treatment Time / Fractionation (Optional- Include Units) Rx 2: 0.50min
Detail Level: Detailed
Simple Simulation Preamble Text Will Be Included With Simple Simulations (.......... Indications): Simple simulation was performed today for the following reasons:
Treatment Time In Min (Optional): 0.60
Custom Shielding Preamble Text Will Not Be Included With Simple Simulations (.......... X X Y Cm): A lead shield of 0.762 mm thickness is utilized to form a molded, custom shield with a
Comments: On Target, RTOG 0 Decrease in energy from 70kv to 50kv, after fraction number 6 per prescription protocol.
Time Dose Fractionation (Optional- Include Units If Applicable) Rx 2: 53
Day Of The Week Treatment Administered: Thursday
Patient Positioning: Supine
Daily Fractionated Dose (Optional- Include Units): 381.14cGy

## 2018-11-19 ENCOUNTER — APPOINTMENT (OUTPATIENT)
Age: 68
Setting detail: DERMATOLOGY
End: 2018-11-20

## 2018-11-19 PROBLEM — C44.02 SQUAMOUS CELL CARCINOMA OF SKIN OF LIP: Status: ACTIVE | Noted: 2018-11-19

## 2018-11-19 PROBLEM — C44.629 SQUAMOUS CELL CARCINOMA OF SKIN OF LEFT UPPER LIMB, INCLUDING SHOULDER: Status: ACTIVE | Noted: 2018-11-19

## 2018-11-19 PROCEDURE — 77300 RADIATION THERAPY DOSE PLAN: CPT

## 2018-11-19 PROCEDURE — 77280 THER RAD SIMULAJ FIELD SMPL: CPT

## 2018-11-19 PROCEDURE — 77401 RADIATION TX DELIVERY SUPFC: CPT

## 2018-11-19 PROCEDURE — G6001 ECHO GUIDANCE RADIOTHERAPY: HCPCS

## 2018-11-19 PROCEDURE — OTHER FOLLOW UP FOR NEXT VISIT: OTHER

## 2018-11-19 PROCEDURE — OTHER SUPERFICIAL RADIATION TREATMENT: OTHER

## 2018-11-19 PROCEDURE — OTHER TREATMENT REGIMEN: OTHER

## 2018-11-19 NOTE — PROCEDURE: TREATMENT REGIMEN
Detail Level: Zone
Plan: Per the request of Dr Reid, Ms. Rudd has been treated today with superficial radiation therapy for non-melanoma skin cancer.   \\n \\nWritten informed consent has been previously obtained from this patient for this treatment.  This consent is documented in the patient’s chart.  The patient gave verbal consent to continue treatment today.\\n \\nThe patient was treated with a specific radiation dose and setup as prescribed by the provider listed on this visit note.  A Radiation Therapist performed administration of radiation. The treatment parameters and cumulative dose are indicated above. \\n \\nPrior to administering the radiation, the patient underwent a verification simulation defining relevant normal and abnormal target anatomy, and acquiring images and data necessary to develop optimal radiation treatment process for the patient. This process includes verification of the treatment port(s) and proper treatment positioning.  All treatment ports were photographed.  The patient’s customized lead blocking was confirmed.   Considering, superficial radiotherapy setup is a clinical setup, this requires physician and radiation therapist to clarify location interest being treated against initial images, pathology and patient anatomy. Care was taken ensuring fields treated were geometrically accurate and properly positioned using daily verification simulation.  This process is also utilized to determine if any changes are necessary.   These steps are therefore medically necessary ensuring safe and effective administration of radiation.\\n \\nA high frequency ultrasound image was acquired today for two-dimensional evaluation of the tumor volume and response to treatment, in addition to geometric accuracy of field placement. The daily field placement is separate and distinct from the initial simulation, and is an important task in providing safe administration of radiation therapy. Physician evaluation of the ultrasound tumor depth will be ongoing throughout the course of treatment, and is deemed medically necessary in order to ensure the efficacy of treatment. Today’s image was evaluated for determination of continuation of treatment with the current plan or with necessary changes as appropriate. Additionally, the use of visualization and targeted assessment allows the patient to be able to see their cancer(s) progress, encouraging patient to complete full course of radiotherapy. \\n \\nPer Dr. Reid, continued daily US guidance and clinical setup simulation is required for field placement, measurement of tumor depth, progress and acute effect monitoring.  \\n\\nLeft upper cutaneous Lip \\nUs depth is 0.95mm (edema), Repop +++, MANDO equivocal\\n\\nLeft Proximal Dorsal Forearm\\nUs depth is 0.99mm (edema), Repop +++, MANDO equivocal\\n
Modify Regimen: Decrease energy per prescription

## 2018-11-19 NOTE — PROCEDURE: SUPERFICIAL RADIATION TREATMENT
Total Number Of Fractions: 6
Treatment Time / Fractionation (Optional- Include Units): 0.59Min
Number Of Days Off Treatment: 1
Fractionation Number (Evaluation): 5
Time Dose Fractionation (Optional- Include Units If Applicable): 45
Total Number Of Fractions Rx 3: 15
Fractionation Number: 7
Render Prescriptions In Note?: No
Shielding Size (Optional- Include Units): 2.0cm x 2.0cm
Detail Level: Detailed
Computed Treatment Time In Min (Will Render The Same As Calculated Treatment Time If Left Blank): 0.51
Field Size (Applicator): 2.5 cm
Bill For Dosimetry/Render Treatment Time Calculation In Note: Yes
Treatment Time / Fractionation (Optional- Include Units) Rx 2: 0.50min
Dose Per Fractionation In Cgy (Optional): 384.03
Energy (Optional-Please Include Units) Rx 2: 50Kv
Port Dimensions-X Axis In Cm: 2
Custom Shielding Preamble Text Will Not Be Included With Simple Simulations (.......... X X Y Cm): A lead shield of 0.762 mm thickness is utilized to form a molded, custom shield with a
Intro Statement (Will Not Render If Left Blank): The patient is undergoing superficial radiation therapy for skin cancer and presents for weekly evaluation and management.  Per protocol and as documented on the flow sheet, the patient was questioned as to subjective redness, pruritus, pain, drainage, fatigue, or any other symptoms.  Objectively, the radiation area was evaluated with regards to erythema, atrophy, scale, crusting, erosion, ulceration, edema, purpura, tenderness, warmth, drainage, and any other findings.  The plan was extensively reviewed including the dose, and dosing schedule.  The simulation and clinical setup was also reviewed as was the external and any internal shields and based on this review the appropriateness and sufficiency of treatment was determined.
Pathology Override (Pathology Will Render As Diagnosis Name If Left Blank): Superficial Squamous Cell Carcinoma
Patient Positioning: Supine
Day Of The Week Treatment Administered: Monday
Simple Simulation Preamble Text Will Be Included With Simple Simulations (.......... Indications): Simple simulation was performed today for the following reasons:
Cumulative Dose In Cgy (Optional): 2673.13
Daily Fractionated Dose (Optional- Include Units): 381.14cGy
Energy (Optional-Please Include Units): 70kV
Total Dose (Optional-Please Include Units) Rx 2: 2684.5cGy
Daily Fractionated Dose (Optional- Include Units) Rx 2: 383.5cGy
Functional Status: 0 (fully active)
Assessment: Appropriate reaction
Total Dose (Optional-Please Include Units): 2286.84cGy
Custom Shielding Afterword Text Will Not Be Included With Simple Simulations (X X Y Cm............): port to correlate with the lesion size, including treatment margin. The custom lead shield is adequate to accommodate the appropriate applicator and provide adequate shielding around the treatment site. Additional shielding (as noted below) is used to protect sensitive, normal tissues.
Comments: On Target, RTOG 0 Decrease in energy from 70kv to 50kv, after fraction number 6 per prescription protocol.
Treatment Device Design After Initial Simulation Justification (Will Render If Bill For Treatment Devices = Yes): The patient is status post radiation simulation and is evaluated as to the use of additional devices for shielding and placement for radiation therapy.
Initial Radiation Treatment Planning (Will Render If Bill Simulation = Yes): The patient had a complete consultation regarding all applicable modalities for the treatment of their skin cancer and based on a variety of factors including the type of tumor, size, and location, the relevant medical history as well as local tissue factors, the functional status of the individual, the ability to perform necessary postoperative wound instructions and the need for simultaneous treatments as well as overall wound healing status, it was determined that the patient would begin radiation therapy treatment for skin cancer.  A full simulation and treatment device design was performed including the determination and formulation of appropriate simple and complex devices including lead shield of 0.762 mm thickness to form molded customized shielding to specifically correlate with the lesion size including treatment margin.  The custom lead shield is adequate to accommodate the appropriate applicator and provide adequate shielding around the treatment site.  The specific field applicator, shields, and devices both simple and complex as well as the specific patient setup is outlined below.  The patient was given a full consent for superficial radiation to both verbally and in writing and the full determination of patient's eligibility for treatment and selection is outlined on the patient eligibility and treatment selection form.  The specific superficial radiotherapy prescription was determined and was documented on the superficial radiotherapy prescription form.  A treatment calculation was also performed and documented on the treatment calculation form.  Based on the prescription, the patient was scheduled for a series of fractional treatments.
Treatment Margins In Cm: 0.5
Time Dose Fractionation (Optional- Include Units If Applicable) Rx 2: 53

## 2018-11-26 ENCOUNTER — APPOINTMENT (OUTPATIENT)
Age: 68
Setting detail: DERMATOLOGY
End: 2018-11-26

## 2018-11-26 PROBLEM — C44.02 SQUAMOUS CELL CARCINOMA OF SKIN OF LIP: Status: ACTIVE | Noted: 2018-11-26

## 2018-11-26 PROBLEM — C44.629 SQUAMOUS CELL CARCINOMA OF SKIN OF LEFT UPPER LIMB, INCLUDING SHOULDER: Status: ACTIVE | Noted: 2018-11-26

## 2018-11-26 PROCEDURE — OTHER FOLLOW UP FOR NEXT VISIT: OTHER

## 2018-11-26 PROCEDURE — G6001 ECHO GUIDANCE RADIOTHERAPY: HCPCS

## 2018-11-26 PROCEDURE — OTHER SUPERFICIAL RADIATION TREATMENT: OTHER

## 2018-11-26 PROCEDURE — OTHER TREATMENT REGIMEN: OTHER

## 2018-11-26 PROCEDURE — 77401 RADIATION TX DELIVERY SUPFC: CPT

## 2018-11-26 PROCEDURE — 77280 THER RAD SIMULAJ FIELD SMPL: CPT

## 2018-11-26 NOTE — PROCEDURE: SUPERFICIAL RADIATION TREATMENT
Include Rx 2 When Rendering Additional Prescriptions: Yes
Pathology Override (Pathology Will Render As Diagnosis Name If Left Blank): Superficial Squamous Cell Carcinoma
Treatment Time In Min (Optional): 0.51
Field Number: 1
Field Size (Applicator): 2.5 cm
Total Dose (Optional-Please Include Units): 2286.84cGy
Energy (Include Units): 50Kv
Render Patient Eligibility And Selection In Note?: No
Custom Shielding Preamble Text Will Not Be Included With Simple Simulations (.......... X X Y Cm): A lead shield of 0.762 mm thickness is utilized to form a molded, custom shield with a
Time Dose Fractionation (Optional- Include Units If Applicable): 45
Port Dimensions-X Axis In Cm: 2
Custom Shielding Afterword Text Will Not Be Included With Simple Simulations (X X Y Cm............): port to correlate with the lesion size, including treatment margin. The custom lead shield is adequate to accommodate the appropriate applicator and provide adequate shielding around the treatment site. Additional shielding (as noted below) is used to protect sensitive, normal tissues.
Treatment Margins In Cm: 0.5
Time Dose Fractionation (Optional- Include Units If Applicable) Rx 2: 53
Shielding Size (Optional- Include Units) Rx 2: 2.0cm x 2.0cm
Functional Status: 0 (fully active)
Fractions / Week Rx 4: 5
Assessment: Appropriate reaction
Initial Radiation Treatment Planning (Will Render If Bill Simulation = Yes): The patient had a complete consultation regarding all applicable modalities for the treatment of their skin cancer and based on a variety of factors including the type of tumor, size, and location, the relevant medical history as well as local tissue factors, the functional status of the individual, the ability to perform necessary postoperative wound instructions and the need for simultaneous treatments as well as overall wound healing status, it was determined that the patient would begin radiation therapy treatment for skin cancer.  A full simulation and treatment device design was performed including the determination and formulation of appropriate simple and complex devices including lead shield of 0.762 mm thickness to form molded customized shielding to specifically correlate with the lesion size including treatment margin.  The custom lead shield is adequate to accommodate the appropriate applicator and provide adequate shielding around the treatment site.  The specific field applicator, shields, and devices both simple and complex as well as the specific patient setup is outlined below.  The patient was given a full consent for superficial radiation to both verbally and in writing and the full determination of patient's eligibility for treatment and selection is outlined on the patient eligibility and treatment selection form.  The specific superficial radiotherapy prescription was determined and was documented on the superficial radiotherapy prescription form.  A treatment calculation was also performed and documented on the treatment calculation form.  Based on the prescription, the patient was scheduled for a series of fractional treatments.
Cumulative Dose In Cgy (Optional): 3057.16
Treatment Device Design After Initial Simulation Justification (Will Render If Bill For Treatment Devices = Yes): The patient is status post radiation simulation and is evaluated as to the use of additional devices for shielding and placement for radiation therapy.
Treatment Time / Fractionation (Optional- Include Units): 0.59Min
Daily Fractionated Dose (Optional- Include Units): 381.14cGy
Total Number Of Fractions Rx 2: 7
Fractionation Number: 8
Treatment Time / Fractionation (Optional- Include Units) Rx 2: 0.50min
Intro Statement (Will Not Render If Left Blank): The patient is undergoing superficial radiation therapy for skin cancer and presents for weekly evaluation and management.  Per protocol and as documented on the flow sheet, the patient was questioned as to subjective redness, pruritus, pain, drainage, fatigue, or any other symptoms.  Objectively, the radiation area was evaluated with regards to erythema, atrophy, scale, crusting, erosion, ulceration, edema, purpura, tenderness, warmth, drainage, and any other findings.  The plan was extensively reviewed including the dose, and dosing schedule.  The simulation and clinical setup was also reviewed as was the external and any internal shields and based on this review the appropriateness and sufficiency of treatment was determined.
Dose Per Fractionation In Cgy (Optional): 384.03
Simple Simulation Preamble Text Will Be Included With Simple Simulations (.......... Indications): Simple simulation was performed today for the following reasons:
Patient Positioning: Supine
Energy (Optional-Please Include Units): 70kV
Total Number Of Fractions Rx 3: 15
Day Of The Week Treatment Administered: Monday
Total Dose (Optional-Please Include Units) Rx 2: 2684.5cGy
Detail Level: Detailed
Daily Fractionated Dose (Optional- Include Units) Rx 2: 383.5cGy
Total Number Of Fractions: 6
Comments: On Target, RTOG 0 Decrease in energy from 70kv to 50kv, after fraction number 6 per prescription protocol.

## 2018-11-26 NOTE — PROCEDURE: TREATMENT REGIMEN
Detail Level: Zone
Plan: Per the request of Dr Reid, Ms. Rudd has been treated today with superficial radiation therapy for non-melanoma skin cancer.   \\n \\nWritten informed consent has been previously obtained from this patient for this treatment.  This consent is documented in the patient’s chart.  The patient gave verbal consent to continue treatment today.\\n \\nThe patient was treated with a specific radiation dose and setup as prescribed by the provider listed on this visit note.  A Radiation Therapist performed administration of radiation. The treatment parameters and cumulative dose are indicated above. \\n \\nPrior to administering the radiation, the patient underwent a verification simulation defining relevant normal and abnormal target anatomy, and acquiring images and data necessary to develop optimal radiation treatment process for the patient. This process includes verification of the treatment port(s) and proper treatment positioning.  All treatment ports were photographed.  The patient’s customized lead blocking was confirmed.   Considering, superficial radiotherapy setup is a clinical setup, this requires physician and radiation therapist to clarify location interest being treated against initial images, pathology and patient anatomy. Care was taken ensuring fields treated were geometrically accurate and properly positioned using daily verification simulation.  This process is also utilized to determine if any changes are necessary.   These steps are therefore medically necessary ensuring safe and effective administration of radiation.\\n \\nA high frequency ultrasound image was acquired today for two-dimensional evaluation of the tumor volume and response to treatment, in addition to geometric accuracy of field placement. The daily field placement is separate and distinct from the initial simulation, and is an important task in providing safe administration of radiation therapy. Physician evaluation of the ultrasound tumor depth will be ongoing throughout the course of treatment, and is deemed medically necessary in order to ensure the efficacy of treatment. Today’s image was evaluated for determination of continuation of treatment with the current plan or with necessary changes as appropriate. Additionally, the use of visualization and targeted assessment allows the patient to be able to see their cancer(s) progress, encouraging patient to complete full course of radiotherapy. \\n \\nPer Dr. Reid, continued daily US guidance and clinical setup simulation is required for field placement, measurement of tumor depth, progress and acute effect monitoring.  \\n\\nLeft upper cutaneous Lip \\nUs depth is 1.13mm (edema), Repop ++, MANDO equivocal\\n\\nLeft Proximal Dorsal Forearm\\nUs depth is 1.16mm (edema), Repop ++, MANDO equivocal\\n

## 2018-12-04 ENCOUNTER — APPOINTMENT (OUTPATIENT)
Age: 68
Setting detail: DERMATOLOGY
End: 2018-12-05

## 2018-12-04 PROBLEM — C44.02 SQUAMOUS CELL CARCINOMA OF SKIN OF LIP: Status: ACTIVE | Noted: 2018-12-04

## 2018-12-04 PROBLEM — C44.629 SQUAMOUS CELL CARCINOMA OF SKIN OF LEFT UPPER LIMB, INCLUDING SHOULDER: Status: ACTIVE | Noted: 2018-12-04

## 2018-12-04 PROCEDURE — OTHER FOLLOW UP FOR NEXT VISIT: OTHER

## 2018-12-04 PROCEDURE — G6001 ECHO GUIDANCE RADIOTHERAPY: HCPCS

## 2018-12-04 PROCEDURE — 77401 RADIATION TX DELIVERY SUPFC: CPT

## 2018-12-04 PROCEDURE — OTHER SUPERFICIAL RADIATION TREATMENT: OTHER

## 2018-12-04 PROCEDURE — OTHER TREATMENT REGIMEN: OTHER

## 2018-12-04 PROCEDURE — 77280 THER RAD SIMULAJ FIELD SMPL: CPT

## 2018-12-04 NOTE — PROCEDURE: SUPERFICIAL RADIATION TREATMENT
Fractions / Week Rx 3: 5
Bill For Dosimetry/Render Decay And Dose Adjustment Calculation In Note: No
Dose / Tx In Cgy (Optional): 384.03
Energy (Optional-Please Include Units): 70kV
Field Size (Applicator) Rx 2: 2.5 cm
Treatment Time In Min (Optional): 0.51
Number Of Days Off Treatment: 1
Custom Shielding Afterword Text Will Not Be Included With Simple Simulations (X X Y Cm............): port to correlate with the lesion size, including treatment margin. The custom lead shield is adequate to accommodate the appropriate applicator and provide adequate shielding around the treatment site. Additional shielding (as noted below) is used to protect sensitive, normal tissues.
Total Number Of Fractions Rx 2: 7
Daily Fractionated Dose (Optional- Include Units): 381.14cGy
Fractions / Week: 2
Energy (Optional-Please Include Units): 50kV
Treatment Time / Fractionation (Optional- Include Units) Rx 2: 0.50min
Comments: On Target, RTOG 0 Decrease in energy from 70kv to 50kv, after fraction number 6 per prescription protocol.
Treatment Margins In Cm: 0.5
Daily Fractionated Dose (Optional- Include Units) Rx 2: 383.5cGy
Total Number Of Fractions: 6
Custom Shielding Preamble Text Will Not Be Included With Simple Simulations (.......... X X Y Cm): A lead shield of 0.762 mm thickness is utilized to form a molded, custom shield with a
Assessment: Appropriate reaction
Day Of The Week Treatment Administered: Tuesday
Include Rx 2 When Rendering Additional Prescriptions: Yes
Cumulative Dose In Cgy (Optional): 3441.19
Total Number Of Fractions Rx 4: 15
Patient Positioning: Supine
Simple Simulation Preamble Text Will Be Included With Simple Simulations (.......... Indications): Simple simulation was performed today for the following reasons:
Pathology Override (Pathology Will Render As Diagnosis Name If Left Blank): Superficial Squamous Cell Carcinoma
Total Dose (Optional-Please Include Units): 2286.84cGy
Shielding Size (Optional- Include Units) Rx 2: 2.0cm x 2.0cm
Time Dose Fractionation (Optional- Include Units If Applicable) Rx 2: 53
Functional Status: 0 (fully active)
Treatment Device Design After Initial Simulation Justification (Will Render If Bill For Treatment Devices = Yes): The patient is status post radiation simulation and is evaluated as to the use of additional devices for shielding and placement for radiation therapy.
Time Dose Fractionation (Optional- Include Units If Applicable): 45
Simple Simulation Afterword Text Will Be Included With Simple Simulations (Indications............): The patient had a complete consultation regarding all applicable modalities for the treatment of their skin cancer and based on a variety of factors including the type of tumor, size, and location, the relevant medical history as well as local tissue factors, the functional status of the individual, the ability to perform necessary postoperative wound instructions and the need for simultaneous treatments as well as overall wound healing status, it was determined that the patient would begin radiation therapy treatment for skin cancer.  A full simulation and treatment device design was performed including the determination and formulation of appropriate simple and complex devices including lead shield of 0.762 mm thickness to form molded customized shielding to specifically correlate with the lesion size including treatment margin.  The custom lead shield is adequate to accommodate the appropriate applicator and provide adequate shielding around the treatment site.  The specific field applicator, shields, and devices both simple and complex as well as the specific patient setup is outlined below.  The patient was given a full consent for superficial radiation to both verbally and in writing and the full determination of patient's eligibility for treatment and selection is outlined on the patient eligibility and treatment selection form.  The specific superficial radiotherapy prescription was determined and was documented on the superficial radiotherapy prescription form.  A treatment calculation was also performed and documented on the treatment calculation form.  Based on the prescription, the patient was scheduled for a series of fractional treatments.
Total Dose (Optional-Please Include Units) Rx 2: 2684.5cGy
Intro Statement (Will Not Render If Left Blank): The patient is undergoing superficial radiation therapy for skin cancer and presents for weekly evaluation and management.  Per protocol and as documented on the flow sheet, the patient was questioned as to subjective redness, pruritus, pain, drainage, fatigue, or any other symptoms.  Objectively, the radiation area was evaluated with regards to erythema, atrophy, scale, crusting, erosion, ulceration, edema, purpura, tenderness, warmth, drainage, and any other findings.  The plan was extensively reviewed including the dose, and dosing schedule.  The simulation and clinical setup was also reviewed as was the external and any internal shields and based on this review the appropriateness and sufficiency of treatment was determined.
Treatment Time / Fractionation (Optional- Include Units): 0.59Min
Detail Level: Detailed
Fractionation Number: 9

## 2018-12-04 NOTE — PROCEDURE: TREATMENT REGIMEN
Detail Level: Zone
Plan: Per the request of Dr Reid, Ms. Rudd has been treated today with superficial radiation therapy for non-melanoma skin cancer.   \\n \\nWritten informed consent has been previously obtained from this patient for this treatment.  This consent is documented in the patient’s chart.  The patient gave verbal consent to continue treatment today.\\n \\nThe patient was treated with a specific radiation dose and setup as prescribed by the provider listed on this visit note.  A Radiation Therapist performed administration of radiation. The treatment parameters and cumulative dose are indicated above. \\n \\nPrior to administering the radiation, the patient underwent a verification simulation defining relevant normal and abnormal target anatomy, and acquiring images and data necessary to develop optimal radiation treatment process for the patient. This process includes verification of the treatment port(s) and proper treatment positioning.  All treatment ports were photographed.  The patient’s customized lead blocking was confirmed.   Considering, superficial radiotherapy setup is a clinical setup, this requires physician and radiation therapist to clarify location interest being treated against initial images, pathology and patient anatomy. Care was taken ensuring fields treated were geometrically accurate and properly positioned using daily verification simulation.  This process is also utilized to determine if any changes are necessary.   These steps are therefore medically necessary ensuring safe and effective administration of radiation.\\n \\nA high frequency ultrasound image was acquired today for two-dimensional evaluation of the tumor volume and response to treatment, in addition to geometric accuracy of field placement. The daily field placement is separate and distinct from the initial simulation, and is an important task in providing safe administration of radiation therapy. Physician evaluation of the ultrasound tumor depth will be ongoing throughout the course of treatment, and is deemed medically necessary in order to ensure the efficacy of treatment. Today’s image was evaluated for determination of continuation of treatment with the current plan or with necessary changes as appropriate. Additionally, the use of visualization and targeted assessment allows the patient to be able to see their cancer(s) progress, encouraging patient to complete full course of radiotherapy. \\n \\nPer Dr. Reid, continued daily US guidance and clinical setup simulation is required for field placement, measurement of tumor depth, progress and acute effect monitoring.  \\n\\nLeft upper cutaneous Lip \\nUs depth is 1.28mm (edema), Repop +, MANDO equivocal\\n\\nLeft Proximal Dorsal Forearm\\nUs depth is 1.48mm (edema), Repop ++, MANDO equivocal\\n

## 2018-12-06 ENCOUNTER — APPOINTMENT (OUTPATIENT)
Age: 68
Setting detail: DERMATOLOGY
End: 2018-12-07

## 2018-12-06 PROBLEM — C44.629 SQUAMOUS CELL CARCINOMA OF SKIN OF LEFT UPPER LIMB, INCLUDING SHOULDER: Status: ACTIVE | Noted: 2018-12-06

## 2018-12-06 PROBLEM — C44.02 SQUAMOUS CELL CARCINOMA OF SKIN OF LIP: Status: ACTIVE | Noted: 2018-12-06

## 2018-12-06 PROCEDURE — OTHER FOLLOW UP FOR NEXT VISIT: OTHER

## 2018-12-06 PROCEDURE — OTHER TREATMENT REGIMEN: OTHER

## 2018-12-06 PROCEDURE — 77280 THER RAD SIMULAJ FIELD SMPL: CPT

## 2018-12-06 PROCEDURE — OTHER SUPERFICIAL RADIATION TREATMENT: OTHER

## 2018-12-06 PROCEDURE — G6001 ECHO GUIDANCE RADIOTHERAPY: HCPCS

## 2018-12-06 PROCEDURE — 77401 RADIATION TX DELIVERY SUPFC: CPT

## 2018-12-06 NOTE — PROCEDURE: TREATMENT REGIMEN
Modify Regimen: Pt declined evaluation due to time constraints. OTV at next visit.
Detail Level: Zone
Plan: Per the request of Dr Reid, Ms. Rudd has been treated today with superficial radiation therapy for non-melanoma skin cancer.   \\n \\nWritten informed consent has been previously obtained from this patient for this treatment.  This consent is documented in the patient’s chart.  The patient gave verbal consent to continue treatment today.\\n \\nThe patient was treated with a specific radiation dose and setup as prescribed by the provider listed on this visit note.  A Radiation Therapist performed administration of radiation. The treatment parameters and cumulative dose are indicated above. \\n \\nPrior to administering the radiation, the patient underwent a verification simulation defining relevant normal and abnormal target anatomy, and acquiring images and data necessary to develop optimal radiation treatment process for the patient. This process includes verification of the treatment port(s) and proper treatment positioning.  All treatment ports were photographed.  The patient’s customized lead blocking was confirmed.   Considering, superficial radiotherapy setup is a clinical setup, this requires physician and radiation therapist to clarify location interest being treated against initial images, pathology and patient anatomy. Care was taken ensuring fields treated were geometrically accurate and properly positioned using daily verification simulation.  This process is also utilized to determine if any changes are necessary.   These steps are therefore medically necessary ensuring safe and effective administration of radiation.\\n \\nA high frequency ultrasound image was acquired today for two-dimensional evaluation of the tumor volume and response to treatment, in addition to geometric accuracy of field placement. The daily field placement is separate and distinct from the initial simulation, and is an important task in providing safe administration of radiation therapy. Physician evaluation of the ultrasound tumor depth will be ongoing throughout the course of treatment, and is deemed medically necessary in order to ensure the efficacy of treatment. Today’s image was evaluated for determination of continuation of treatment with the current plan or with necessary changes as appropriate. Additionally, the use of visualization and targeted assessment allows the patient to be able to see their cancer(s) progress, encouraging patient to complete full course of radiotherapy. \\n \\nPer Dr. Reid, continued daily US guidance and clinical setup simulation is required for field placement, measurement of tumor depth, progress and acute effect monitoring.  \\n\\nLeft upper cutaneous Lip \\nUs depth is 1.13mm (edema), Repop +, MANDO equivocal\\n\\nLeft Proximal Dorsal Forearm\\nUs depth is 1.21mm (edema), Repop +, MANDO equivocal\\n

## 2018-12-06 NOTE — PROCEDURE: SUPERFICIAL RADIATION TREATMENT
Day Of The Week Treatment Administered: Thursday
Simple Simulation Preamble Text Will Be Included With Simple Simulations (.......... Indications): Simple simulation was performed today for the following reasons:
Fractions / Week Rx 2: 2
Energy (Include Units): 50Kv
Total Dose (Optional-Please Include Units) Rx 2: 2684.5cGy
Initial Radiation Treatment Planning (Will Render If Bill Simulation = Yes): The patient had a complete consultation regarding all applicable modalities for the treatment of their skin cancer and based on a variety of factors including the type of tumor, size, and location, the relevant medical history as well as local tissue factors, the functional status of the individual, the ability to perform necessary postoperative wound instructions and the need for simultaneous treatments as well as overall wound healing status, it was determined that the patient would begin radiation therapy treatment for skin cancer.  A full simulation and treatment device design was performed including the determination and formulation of appropriate simple and complex devices including lead shield of 0.762 mm thickness to form molded customized shielding to specifically correlate with the lesion size including treatment margin.  The custom lead shield is adequate to accommodate the appropriate applicator and provide adequate shielding around the treatment site.  The specific field applicator, shields, and devices both simple and complex as well as the specific patient setup is outlined below.  The patient was given a full consent for superficial radiation to both verbally and in writing and the full determination of patient's eligibility for treatment and selection is outlined on the patient eligibility and treatment selection form.  The specific superficial radiotherapy prescription was determined and was documented on the superficial radiotherapy prescription form.  A treatment calculation was also performed and documented on the treatment calculation form.  Based on the prescription, the patient was scheduled for a series of fractional treatments.
Field Size (Applicator): 2.5 cm
Bill For Treatment Devices Only: No
Detail Level: Detailed
Time Dose Fractionation (Optional- Include Units If Applicable) Rx 2: 53
Daily Fractionated Dose (Optional- Include Units) Rx 2: 383.5cGy
Total Number Of Fractions Rx 4: 15
Custom Shielding Preamble Text Will Not Be Included With Simple Simulations (.......... X X Y Cm): A lead shield of 0.762 mm thickness is utilized to form a molded, custom shield with a
Number Of Days Off Treatment: 1
Custom Shielding Afterword Text Will Not Be Included With Simple Simulations (X X Y Cm............): port to correlate with the lesion size, including treatment margin. The custom lead shield is adequate to accommodate the appropriate applicator and provide adequate shielding around the treatment site. Additional shielding (as noted below) is used to protect sensitive, normal tissues.
Assessment: Appropriate reaction
Fractions / Week Rx 4: 5
Treatment Time / Fractionation (Optional- Include Units) Rx 2: 0.50min
Shielding Size (Optional- Include Units): 2.0cm x 2.0cm
Total Number Of Fractions: 6
Bill For Radiation Treatment: Yes
Energy (Optional-Please Include Units): 70kV
Cumulative Dose In Cgy (Optional): 3825.22
Computed Treatment Time In Min (Will Render The Same As Calculated Treatment Time If Left Blank): 0.51
Treatment Time / Fractionation (Optional- Include Units): 0.59Min
Dose / Tx In Cgy (Optional): 384.03
Total Number Of Fractions Rx 2: 7
Pathology Override (Pathology Will Render As Diagnosis Name If Left Blank): Superficial Squamous Cell Carcinoma
Comments: On Target, RTOG 0 Decrease in energy from 70kv to 50kv, after fraction number 6 per prescription protocol.
Treatment Device Design After Initial Simulation Justification (Will Render If Bill For Treatment Devices = Yes): The patient is status post radiation simulation and is evaluated as to the use of additional devices for shielding and placement for radiation therapy.
Time Dose Fractionation (Optional- Include Units If Applicable): 45
Functional Status: 0 (fully active)
Daily Fractionated Dose (Optional- Include Units): 381.14cGy
Treatment Margins In Cm: 0.5
Fractionation Number: 10
Intro Statement (Will Not Render If Left Blank): The patient is undergoing superficial radiation therapy for skin cancer and presents for weekly evaluation and management.  Per protocol and as documented on the flow sheet, the patient was questioned as to subjective redness, pruritus, pain, drainage, fatigue, or any other symptoms.  Objectively, the radiation area was evaluated with regards to erythema, atrophy, scale, crusting, erosion, ulceration, edema, purpura, tenderness, warmth, drainage, and any other findings.  The plan was extensively reviewed including the dose, and dosing schedule.  The simulation and clinical setup was also reviewed as was the external and any internal shields and based on this review the appropriateness and sufficiency of treatment was determined.
Patient Positioning: Supine
Total Dose (Optional-Please Include Units): 2286.84cGy

## 2018-12-11 ENCOUNTER — APPOINTMENT (OUTPATIENT)
Age: 68
Setting detail: DERMATOLOGY
End: 2018-12-11

## 2018-12-11 PROBLEM — C44.02 SQUAMOUS CELL CARCINOMA OF SKIN OF LIP: Status: ACTIVE | Noted: 2018-12-11

## 2018-12-11 PROBLEM — C44.629 SQUAMOUS CELL CARCINOMA OF SKIN OF LEFT UPPER LIMB, INCLUDING SHOULDER: Status: ACTIVE | Noted: 2018-12-11

## 2018-12-11 PROCEDURE — OTHER FOLLOW UP FOR NEXT VISIT: OTHER

## 2018-12-11 PROCEDURE — 77280 THER RAD SIMULAJ FIELD SMPL: CPT

## 2018-12-11 PROCEDURE — G6001 ECHO GUIDANCE RADIOTHERAPY: HCPCS

## 2018-12-11 PROCEDURE — OTHER SUPERFICIAL RADIATION TREATMENT: OTHER

## 2018-12-11 PROCEDURE — 99212 OFFICE O/P EST SF 10 MIN: CPT | Mod: 25

## 2018-12-11 PROCEDURE — OTHER TREATMENT REGIMEN: OTHER

## 2018-12-11 PROCEDURE — 77401 RADIATION TX DELIVERY SUPFC: CPT

## 2018-12-11 NOTE — PROCEDURE: TREATMENT REGIMEN
Detail Level: Zone
Plan: Per the request of Dr Reid, Ms. Rudd has been treated today with superficial radiation therapy for non-melanoma skin cancer.   \\n \\nWritten informed consent has been previously obtained from this patient for this treatment.  This consent is documented in the patient’s chart.  The patient gave verbal consent to continue treatment today.\\n \\nThe patient was treated with a specific radiation dose and setup as prescribed by the provider listed on this visit note.  A Radiation Therapist performed administration of radiation. The treatment parameters and cumulative dose are indicated above. \\n \\nPrior to administering the radiation, the patient underwent a verification simulation defining relevant normal and abnormal target anatomy, and acquiring images and data necessary to develop optimal radiation treatment process for the patient. This process includes verification of the treatment port(s) and proper treatment positioning.  All treatment ports were photographed.  The patient’s customized lead blocking was confirmed.   Considering, superficial radiotherapy setup is a clinical setup, this requires physician and radiation therapist to clarify location interest being treated against initial images, pathology and patient anatomy. Care was taken ensuring fields treated were geometrically accurate and properly positioned using daily verification simulation.  This process is also utilized to determine if any changes are necessary.   These steps are therefore medically necessary ensuring safe and effective administration of radiation.\\n \\nA high frequency ultrasound image was acquired today for two-dimensional evaluation of the tumor volume and response to treatment, in addition to geometric accuracy of field placement. The daily field placement is separate and distinct from the initial simulation, and is an important task in providing safe administration of radiation therapy. Physician evaluation of the ultrasound tumor depth will be ongoing throughout the course of treatment, and is deemed medically necessary in order to ensure the efficacy of treatment. Today’s image was evaluated for determination of continuation of treatment with the current plan or with necessary changes as appropriate. Additionally, the use of visualization and targeted assessment allows the patient to be able to see their cancer(s) progress, encouraging patient to complete full course of radiotherapy. \\n \\nPer Dr. Reid, continued daily US guidance and clinical setup simulation is required for field placement, measurement of tumor depth, progress and acute effect monitoring.  \\n\\nLeft upper cutaneous Lip \\nUs depth is 0.96mm (edema), Repop ++, MANDO equivocal\\n\\nLeft Proximal Dorsal Forearm\\nUs depth is 1.48mm (edema), Repop +, MANDO equivocal\\n

## 2018-12-11 NOTE — PROCEDURE: SUPERFICIAL RADIATION TREATMENT
Total Dose (Optional-Please Include Units) Rx 2: 2684.5cGy
Patient Positioning: Supine
Treatment Time / Fractionation (Optional- Include Units) Rx 2: 0.50min
Field Size (Applicator): 2.5 cm
Functional Status: 0 (fully active)
Cumulative Dose In Cgy (Optional): 4209.25
Energy (Optional-Please Include Units): 70kV
Field Number: 1
Bill For Treatment Devices Only: No
Bill For Radiation Treatment: Yes
Time Dose Fractionation (Optional- Include Units If Applicable) Rx 2: 53
Port Dimensions-Y Axis In Cm: 2
Total Dose (Optional-Please Include Units): 2286.84cGy
Custom Shielding Afterword Text Will Not Be Included With Simple Simulations (X X Y Cm............): port to correlate with the lesion size, including treatment margin. The custom lead shield is adequate to accommodate the appropriate applicator and provide adequate shielding around the treatment site. Additional shielding (as noted below) is used to protect sensitive, normal tissues.
Computed Treatment Time In Min (Will Render The Same As Calculated Treatment Time If Left Blank): 0.51
Detail Level: Detailed
Ssd In Cm (Optional): 15
Assessment: Appropriate reaction
Time Dose Fractionation (Optional- Include Units If Applicable): 45
Treatment Margins In Cm: 0.5
Daily Fractionated Dose (Optional- Include Units): 381.14cGy
Total Number Of Fractions: 6
Dose Per Fractionation In Cgy (Optional): 384.03
Intro Statement (Will Not Render If Left Blank): The patient is undergoing superficial radiation therapy for skin cancer and presents for weekly evaluation and management.  Per protocol and as documented on the flow sheet, the patient was questioned as to subjective redness, pruritus, pain, drainage, fatigue, or any other symptoms.  Objectively, the radiation area was evaluated with regards to erythema, atrophy, scale, crusting, erosion, ulceration, edema, purpura, tenderness, warmth, drainage, and any other findings.  The plan was extensively reviewed including the dose, and dosing schedule.  The simulation and clinical setup was also reviewed as was the external and any internal shields and based on this review the appropriateness and sufficiency of treatment was determined.
Fractionation Number (Evaluation): 11
Custom Shielding Preamble Text Will Not Be Included With Simple Simulations (.......... X X Y Cm): A lead shield of 0.762 mm thickness is utilized to form a molded, custom shield with a
Energy (Include Units): 50Kv
Initial Radiation Treatment Planning (Will Render If Bill Simulation = Yes): The patient had a complete consultation regarding all applicable modalities for the treatment of their skin cancer and based on a variety of factors including the type of tumor, size, and location, the relevant medical history as well as local tissue factors, the functional status of the individual, the ability to perform necessary postoperative wound instructions and the need for simultaneous treatments as well as overall wound healing status, it was determined that the patient would begin radiation therapy treatment for skin cancer.  A full simulation and treatment device design was performed including the determination and formulation of appropriate simple and complex devices including lead shield of 0.762 mm thickness to form molded customized shielding to specifically correlate with the lesion size including treatment margin.  The custom lead shield is adequate to accommodate the appropriate applicator and provide adequate shielding around the treatment site.  The specific field applicator, shields, and devices both simple and complex as well as the specific patient setup is outlined below.  The patient was given a full consent for superficial radiation to both verbally and in writing and the full determination of patient's eligibility for treatment and selection is outlined on the patient eligibility and treatment selection form.  The specific superficial radiotherapy prescription was determined and was documented on the superficial radiotherapy prescription form.  A treatment calculation was also performed and documented on the treatment calculation form.  Based on the prescription, the patient was scheduled for a series of fractional treatments.
Total Number Of Fractions Rx 2: 7
Fractions / Week Rx 4: 5
Daily Fractionated Dose (Optional- Include Units) Rx 2: 383.5cGy
Treatment Device Design After Initial Simulation Justification (Will Render If Bill For Treatment Devices = Yes): The patient is status post radiation simulation and is evaluated as to the use of additional devices for shielding and placement for radiation therapy.
Treatment Time / Fractionation (Optional- Include Units): 0.59Min
Pathology Override (Pathology Will Render As Diagnosis Name If Left Blank): Superficial Squamous Cell Carcinoma
Shielding Size (Optional- Include Units): 2.0cm x 2.0cm
Simple Simulation Preamble Text Will Be Included With Simple Simulations (.......... Indications): Simple simulation was performed today for the following reasons:
Comments: On Target, RTOG 1
Day Of The Week Treatment Administered: Tuesday

## 2018-12-13 ENCOUNTER — APPOINTMENT (OUTPATIENT)
Age: 68
Setting detail: DERMATOLOGY
End: 2018-12-17

## 2018-12-13 PROBLEM — C44.629 SQUAMOUS CELL CARCINOMA OF SKIN OF LEFT UPPER LIMB, INCLUDING SHOULDER: Status: ACTIVE | Noted: 2018-12-13

## 2018-12-13 PROBLEM — C44.02 SQUAMOUS CELL CARCINOMA OF SKIN OF LIP: Status: ACTIVE | Noted: 2018-12-13

## 2018-12-13 PROCEDURE — 77401 RADIATION TX DELIVERY SUPFC: CPT

## 2018-12-13 PROCEDURE — 77280 THER RAD SIMULAJ FIELD SMPL: CPT

## 2018-12-13 PROCEDURE — OTHER TREATMENT REGIMEN: OTHER

## 2018-12-13 PROCEDURE — OTHER FOLLOW UP FOR NEXT VISIT: OTHER

## 2018-12-13 PROCEDURE — OTHER SUPERFICIAL RADIATION TREATMENT: OTHER

## 2018-12-13 PROCEDURE — G6001 ECHO GUIDANCE RADIOTHERAPY: HCPCS

## 2018-12-13 NOTE — PROCEDURE: TREATMENT REGIMEN
Detail Level: Zone
Plan: Per the request of Dr Reid, Ms. Rudd has been treated today with superficial radiation therapy for non-melanoma skin cancer.   \\n \\nWritten informed consent has been previously obtained from this patient for this treatment.  This consent is documented in the patient’s chart.  The patient gave verbal consent to continue treatment today.\\n \\nThe patient was treated with a specific radiation dose and setup as prescribed by the provider listed on this visit note.  A Radiation Therapist performed administration of radiation. The treatment parameters and cumulative dose are indicated above. \\n \\nPrior to administering the radiation, the patient underwent a verification simulation defining relevant normal and abnormal target anatomy, and acquiring images and data necessary to develop optimal radiation treatment process for the patient. This process includes verification of the treatment port(s) and proper treatment positioning.  All treatment ports were photographed.  The patient’s customized lead blocking was confirmed.   Considering, superficial radiotherapy setup is a clinical setup, this requires physician and radiation therapist to clarify location interest being treated against initial images, pathology and patient anatomy. Care was taken ensuring fields treated were geometrically accurate and properly positioned using daily verification simulation.  This process is also utilized to determine if any changes are necessary.   These steps are therefore medically necessary ensuring safe and effective administration of radiation.\\n \\nA high frequency ultrasound image was acquired today for two-dimensional evaluation of the tumor volume and response to treatment, in addition to geometric accuracy of field placement. The daily field placement is separate and distinct from the initial simulation, and is an important task in providing safe administration of radiation therapy. Physician evaluation of the ultrasound tumor depth will be ongoing throughout the course of treatment, and is deemed medically necessary in order to ensure the efficacy of treatment. Today’s image was evaluated for determination of continuation of treatment with the current plan or with necessary changes as appropriate. Additionally, the use of visualization and targeted assessment allows the patient to be able to see their cancer(s) progress, encouraging patient to complete full course of radiotherapy. \\n \\nPer Dr. Reid, continued daily US guidance and clinical setup simulation is required for field placement, measurement of tumor depth, progress and acute effect monitoring.  \\n\\nLeft upper cutaneous Lip \\nUs depth is 1.02mm (edema), Repop ++, MANDO equivocal\\n\\nLeft Proximal Dorsal Forearm\\nUs depth is 1.34mm (edema), Repop +, MANDO equivocal\\n

## 2018-12-13 NOTE — PROCEDURE: SUPERFICIAL RADIATION TREATMENT
Bill For Dosimetry/Render Treatment Time Calculation In Note: No
Initial Radiation Treatment Planning (Will Render If Bill Simulation = Yes): The patient had a complete consultation regarding all applicable modalities for the treatment of their skin cancer and based on a variety of factors including the type of tumor, size, and location, the relevant medical history as well as local tissue factors, the functional status of the individual, the ability to perform necessary postoperative wound instructions and the need for simultaneous treatments as well as overall wound healing status, it was determined that the patient would begin radiation therapy treatment for skin cancer.  A full simulation and treatment device design was performed including the determination and formulation of appropriate simple and complex devices including lead shield of 0.762 mm thickness to form molded customized shielding to specifically correlate with the lesion size including treatment margin.  The custom lead shield is adequate to accommodate the appropriate applicator and provide adequate shielding around the treatment site.  The specific field applicator, shields, and devices both simple and complex as well as the specific patient setup is outlined below.  The patient was given a full consent for superficial radiation to both verbally and in writing and the full determination of patient's eligibility for treatment and selection is outlined on the patient eligibility and treatment selection form.  The specific superficial radiotherapy prescription was determined and was documented on the superficial radiotherapy prescription form.  A treatment calculation was also performed and documented on the treatment calculation form.  Based on the prescription, the patient was scheduled for a series of fractional treatments.
Cumulative Dose In Cgy (Optional): 4593.28
Dimensions-Y Axis In Cm: 1
Dose Per Fractionation In Cgy (Optional): 384.03
Treatment Time In Min (Optional): 0.51
Fractions / Week Rx 2: 2
Total Number Of Fractions: 6
Energy (Optional-Please Include Units): 70kV
Fractions / Week Rx 4: 5
Energy (Include Units): 50Kv
Field Size (Applicator) Rx 2: 2.5 cm
Custom Shielding Preamble Text Will Not Be Included With Simple Simulations (.......... X X Y Cm): A lead shield of 0.762 mm thickness is utilized to form a molded, custom shield with a
Ssd In Cm (Optional): 15
Fractionation Number: 12
Custom Shielding Afterword Text Will Not Be Included With Simple Simulations (X X Y Cm............): port to correlate with the lesion size, including treatment margin. The custom lead shield is adequate to accommodate the appropriate applicator and provide adequate shielding around the treatment site. Additional shielding (as noted below) is used to protect sensitive, normal tissues.
Shielding Size (Optional- Include Units): 2.0cm x 2.0cm
Total Dose (Optional-Please Include Units): 2286.84cGy
Functional Status: 0 (fully active)
Time Dose Fractionation (Optional- Include Units If Applicable) Rx 2: 53
Daily Fractionated Dose (Optional- Include Units) Rx 2: 383.5cGy
Treatment Margins In Cm: 0.5
Assessment: Appropriate reaction
Treatment Time / Fractionation (Optional- Include Units) Rx 2: 0.50min
Treatment Time / Fractionation (Optional- Include Units): 0.59Min
Bill For Radiation Treatment: Yes
Detail Level: Detailed
Simple Simulation Preamble Text Will Be Included With Simple Simulations (.......... Indications): Simple simulation was performed today for the following reasons:
Time Dose Fractionation (Optional- Include Units If Applicable): 45
Day Of The Week Treatment Administered: Thursday
Total Dose (Optional-Please Include Units) Rx 2: 2684.5cGy
Treatment Device Design After Initial Simulation Justification (Will Render If Bill For Treatment Devices = Yes): The patient is status post radiation simulation and is evaluated as to the use of additional devices for shielding and placement for radiation therapy.
Pathology Override (Pathology Will Render As Diagnosis Name If Left Blank): Superficial Squamous Cell Carcinoma
Comments: On Target, RTOG 1
Daily Fractionated Dose (Optional- Include Units): 381.14cGy
Patient Positioning: Supine
Fractionation Number (Evaluation): 11
Total Number Of Fractions Rx 2: 7
Intro Statement (Will Not Render If Left Blank): The patient is undergoing superficial radiation therapy for skin cancer and presents for weekly evaluation and management.  Per protocol and as documented on the flow sheet, the patient was questioned as to subjective redness, pruritus, pain, drainage, fatigue, or any other symptoms.  Objectively, the radiation area was evaluated with regards to erythema, atrophy, scale, crusting, erosion, ulceration, edema, purpura, tenderness, warmth, drainage, and any other findings.  The plan was extensively reviewed including the dose, and dosing schedule.  The simulation and clinical setup was also reviewed as was the external and any internal shields and based on this review the appropriateness and sufficiency of treatment was determined.

## 2018-12-18 ENCOUNTER — APPOINTMENT (OUTPATIENT)
Age: 68
Setting detail: DERMATOLOGY
End: 2018-12-18

## 2018-12-18 PROBLEM — C44.629 SQUAMOUS CELL CARCINOMA OF SKIN OF LEFT UPPER LIMB, INCLUDING SHOULDER: Status: ACTIVE | Noted: 2018-12-18

## 2018-12-18 PROBLEM — C44.02 SQUAMOUS CELL CARCINOMA OF SKIN OF LIP: Status: ACTIVE | Noted: 2018-12-18

## 2018-12-18 PROCEDURE — OTHER SUPERFICIAL RADIATION TREATMENT: OTHER

## 2018-12-18 PROCEDURE — OTHER FOLLOW UP FOR NEXT VISIT: OTHER

## 2018-12-18 PROCEDURE — 77280 THER RAD SIMULAJ FIELD SMPL: CPT

## 2018-12-18 PROCEDURE — G6001 ECHO GUIDANCE RADIOTHERAPY: HCPCS

## 2018-12-18 PROCEDURE — OTHER TREATMENT REGIMEN: OTHER

## 2018-12-18 PROCEDURE — 77401 RADIATION TX DELIVERY SUPFC: CPT

## 2018-12-18 NOTE — PROCEDURE: SUPERFICIAL RADIATION TREATMENT
Field Size (Applicator): 2.5 cm
Treatment Time / Fractionation (Optional- Include Units): 0.59Min
Vinh For Simulation Without Treatment Device Design (Simple Simulation): No
Time Dose Fractionation (Optional- Include Units If Applicable) Rx 2: 53
Day Of The Week Treatment Administered: Tuesday
Daily Fractionated Dose (Optional- Include Units) Rx 2: 383.5cGy
Treatment Device Design After Initial Simulation Justification (Will Render If Bill For Treatment Devices = Yes): The patient is status post radiation simulation and is evaluated as to the use of additional devices for shielding and placement for radiation therapy.
Fractions / Week Rx 3: 5
Treatment Margins In Cm: 0.5
Computed Treatment Time In Min (Will Render The Same As Calculated Treatment Time If Left Blank): 0.51
Dimensions-X Axis In Cm: 1
Port Dimensions-X Axis In Cm: 2
Daily Fractionated Dose (Optional- Include Units): 381.14cGy
Detail Level: Detailed
Shielding Size (Optional- Include Units): 2.0cm x 2.0cm
Dose / Tx In Cgy (Optional): 384.03
Simple Simulation Afterword Text Will Be Included With Simple Simulations (Indications............): The patient had a complete consultation regarding all applicable modalities for the treatment of their skin cancer and based on a variety of factors including the type of tumor, size, and location, the relevant medical history as well as local tissue factors, the functional status of the individual, the ability to perform necessary postoperative wound instructions and the need for simultaneous treatments as well as overall wound healing status, it was determined that the patient would begin radiation therapy treatment for skin cancer.  A full simulation and treatment device design was performed including the determination and formulation of appropriate simple and complex devices including lead shield of 0.762 mm thickness to form molded customized shielding to specifically correlate with the lesion size including treatment margin.  The custom lead shield is adequate to accommodate the appropriate applicator and provide adequate shielding around the treatment site.  The specific field applicator, shields, and devices both simple and complex as well as the specific patient setup is outlined below.  The patient was given a full consent for superficial radiation to both verbally and in writing and the full determination of patient's eligibility for treatment and selection is outlined on the patient eligibility and treatment selection form.  The specific superficial radiotherapy prescription was determined and was documented on the superficial radiotherapy prescription form.  A treatment calculation was also performed and documented on the treatment calculation form.  Based on the prescription, the patient was scheduled for a series of fractional treatments.
Total Number Of Fractions Rx 4: 15
Custom Shielding Preamble Text Will Not Be Included With Simple Simulations (.......... X X Y Cm): A lead shield of 0.762 mm thickness is utilized to form a molded, custom shield with a
Comments: On Target, RTOG 1
Energy (Optional-Please Include Units) Rx 2: 50Kv
Treatment Time / Fractionation (Optional- Include Units) Rx 2: 0.50min
Functional Status: 0 (fully active)
Total Dose (Optional-Please Include Units): 2286.84cGy
Custom Shielding Afterword Text Will Not Be Included With Simple Simulations (X X Y Cm............): port to correlate with the lesion size, including treatment margin. The custom lead shield is adequate to accommodate the appropriate applicator and provide adequate shielding around the treatment site. Additional shielding (as noted below) is used to protect sensitive, normal tissues.
Patient Positioning: Supine
Time Dose Fractionation (Optional- Include Units If Applicable): 45
Total Number Of Fractions: 6
Fractionation Number (Evaluation): 11
Cumulative Dose In Cgy (Optional): 4977.31
Simple Simulation Preamble Text Will Be Included With Simple Simulations (.......... Indications): Simple simulation was performed today for the following reasons:
Fractionation Number: 13
Energy (Optional-Please Include Units): 70kV
Intro Statement (Will Not Render If Left Blank): The patient is undergoing superficial radiation therapy for skin cancer and presents for weekly evaluation and management.  Per protocol and as documented on the flow sheet, the patient was questioned as to subjective redness, pruritus, pain, drainage, fatigue, or any other symptoms.  Objectively, the radiation area was evaluated with regards to erythema, atrophy, scale, crusting, erosion, ulceration, edema, purpura, tenderness, warmth, drainage, and any other findings.  The plan was extensively reviewed including the dose, and dosing schedule.  The simulation and clinical setup was also reviewed as was the external and any internal shields and based on this review the appropriateness and sufficiency of treatment was determined.
Include Rx 2 When Rendering Additional Prescriptions: Yes
Assessment: Appropriate reaction
Pathology Override (Pathology Will Render As Diagnosis Name If Left Blank): Superficial Squamous Cell Carcinoma
Total Number Of Fractions Rx 2: 7
Total Dose (Optional-Please Include Units) Rx 2: 2684.5cGy

## 2018-12-18 NOTE — PROCEDURE: TREATMENT REGIMEN
Detail Level: Zone
Plan: Per the request of Dr Reid, Ms. Rudd has been treated today with superficial radiation therapy for non-melanoma skin cancer.   \\n \\nWritten informed consent has been previously obtained from this patient for this treatment.  This consent is documented in the patient’s chart.  The patient gave verbal consent to continue treatment today.\\n \\nThe patient was treated with a specific radiation dose and setup as prescribed by the provider listed on this visit note.  A Radiation Therapist performed administration of radiation. The treatment parameters and cumulative dose are indicated above. \\n \\nPrior to administering the radiation, the patient underwent a verification simulation defining relevant normal and abnormal target anatomy, and acquiring images and data necessary to develop optimal radiation treatment process for the patient. This process includes verification of the treatment port(s) and proper treatment positioning.  All treatment ports were photographed.  The patient’s customized lead blocking was confirmed.   Considering, superficial radiotherapy setup is a clinical setup, this requires physician and radiation therapist to clarify location interest being treated against initial images, pathology and patient anatomy. Care was taken ensuring fields treated were geometrically accurate and properly positioned using daily verification simulation.  This process is also utilized to determine if any changes are necessary.   These steps are therefore medically necessary ensuring safe and effective administration of radiation.\\n \\nA high frequency ultrasound image was acquired today for two-dimensional evaluation of the tumor volume and response to treatment, in addition to geometric accuracy of field placement. The daily field placement is separate and distinct from the initial simulation, and is an important task in providing safe administration of radiation therapy. Physician evaluation of the ultrasound tumor depth will be ongoing throughout the course of treatment, and is deemed medically necessary in order to ensure the efficacy of treatment. Today’s image was evaluated for determination of continuation of treatment with the current plan or with necessary changes as appropriate. Additionally, the use of visualization and targeted assessment allows the patient to be able to see their cancer(s) progress, encouraging patient to complete full course of radiotherapy. \\n \\nPer Dr. Reid, continued daily US guidance and clinical setup simulation is required for field placement, measurement of tumor depth, progress and acute effect monitoring.  \\n\\nLeft upper cutaneous Lip \\nUs depth is 1.06mm (edema), Repop ++, MANDO equivocal\\n\\nLeft Proximal Dorsal Forearm\\nUs depth is 1.34mm (edema), Repop +, MANDO equivocal\\n

## 2018-12-28 ENCOUNTER — APPOINTMENT (OUTPATIENT)
Age: 68
Setting detail: DERMATOLOGY
End: 2018-12-31

## 2018-12-28 PROBLEM — C44.629 SQUAMOUS CELL CARCINOMA OF SKIN OF LEFT UPPER LIMB, INCLUDING SHOULDER: Status: ACTIVE | Noted: 2018-12-28

## 2018-12-28 PROBLEM — I10 ESSENTIAL (PRIMARY) HYPERTENSION: Status: ACTIVE | Noted: 2018-12-28

## 2018-12-28 PROBLEM — C44.02 SQUAMOUS CELL CARCINOMA OF SKIN OF LIP: Status: ACTIVE | Noted: 2018-12-28

## 2018-12-28 PROCEDURE — OTHER SUPERFICIAL RADIATION TREATMENT: OTHER

## 2018-12-28 PROCEDURE — 77427 RADIATION TX MANAGEMENT X5: CPT

## 2018-12-28 PROCEDURE — OTHER TREATMENT REGIMEN: OTHER

## 2018-12-28 PROCEDURE — G6001 ECHO GUIDANCE RADIOTHERAPY: HCPCS

## 2018-12-28 PROCEDURE — OTHER FOLLOW UP FOR NEXT VISIT: OTHER

## 2018-12-28 NOTE — PROCEDURE: SUPERFICIAL RADIATION TREATMENT
Dimensions-Y Axis In Cm: 1
Energy (Optional-Please Include Units) Rx 2: 50Kv
Total Dose (Optional-Please Include Units): 2286.84cGy
Render Patient Eligibility And Selection In Note?: No
Total Number Of Fractions: 6
Field Size (Applicator): 2.5 cm
Time Dose Fractionation (Optional- Include Units If Applicable) Rx 2: 53
Fractionation Number (Evaluation): 11
Patient Positioning: Supine
Fractions / Week Rx 4: 5
Cumulative Dose In Cgy (Optional): 4977.31
Simple Simulation Afterword Text Will Be Included With Simple Simulations (Indications............): The patient had a complete consultation regarding all applicable modalities for the treatment of their skin cancer and based on a variety of factors including the type of tumor, size, and location, the relevant medical history as well as local tissue factors, the functional status of the individual, the ability to perform necessary postoperative wound instructions and the need for simultaneous treatments as well as overall wound healing status, it was determined that the patient would begin radiation therapy treatment for skin cancer.  A full simulation and treatment device design was performed including the determination and formulation of appropriate simple and complex devices including lead shield of 0.762 mm thickness to form molded customized shielding to specifically correlate with the lesion size including treatment margin.  The custom lead shield is adequate to accommodate the appropriate applicator and provide adequate shielding around the treatment site.  The specific field applicator, shields, and devices both simple and complex as well as the specific patient setup is outlined below.  The patient was given a full consent for superficial radiation to both verbally and in writing and the full determination of patient's eligibility for treatment and selection is outlined on the patient eligibility and treatment selection form.  The specific superficial radiotherapy prescription was determined and was documented on the superficial radiotherapy prescription form.  A treatment calculation was also performed and documented on the treatment calculation form.  Based on the prescription, the patient was scheduled for a series of fractional treatments.
Shielding Size (Optional- Include Units): 2.0cm x 2.0cm
Treatment Margins In Cm: 0.5
Dose / Tx In Cgy (Optional): 384.03
Fractions / Week Rx 2: 2
Detail Level: Detailed
Functional Status: 0 (fully active)
Ssd In Cm (Optional): 15
Total Dose (Optional-Please Include Units) Rx 2: 2684.5cGy
Day Of The Week Treatment Administered: Tuesday
Bill And Render Text From Evaluation And Management Tab (Will Bill [id number]): Yes
Fractionation Number: 13
Daily Fractionated Dose (Optional- Include Units) Rx 2: 383.5cGy
Time Dose Fractionation (Optional- Include Units If Applicable): 45
Energy (Optional-Please Include Units): 70kV
Treatment Time / Fractionation (Optional- Include Units): 0.59Min
Simple Simulation Preamble Text Will Be Included With Simple Simulations (.......... Indications): Simple simulation was performed today for the following reasons:
Custom Shielding Afterword Text Will Not Be Included With Simple Simulations (X X Y Cm............): port to correlate with the lesion size, including treatment margin. The custom lead shield is adequate to accommodate the appropriate applicator and provide adequate shielding around the treatment site. Additional shielding (as noted below) is used to protect sensitive, normal tissues.
Daily Fractionated Dose (Optional- Include Units): 381.14cGy
Assessment: Appropriate reaction
Computed Treatment Time In Min (Will Render The Same As Calculated Treatment Time If Left Blank): 0.51
Treatment Device Design After Initial Simulation Justification (Will Render If Bill For Treatment Devices = Yes): The patient is status post radiation simulation and is evaluated as to the use of additional devices for shielding and placement for radiation therapy.
Pathology Override (Pathology Will Render As Diagnosis Name If Left Blank): Superficial Squamous Cell Carcinoma
Total Number Of Fractions Rx 2: 7
Custom Shielding Preamble Text Will Not Be Included With Simple Simulations (.......... X X Y Cm): A lead shield of 0.762 mm thickness is utilized to form a molded, custom shield with a
Intro Statement (Will Not Render If Left Blank): The patient is undergoing superficial radiation therapy for skin cancer and presents for weekly evaluation and management.  Per protocol and as documented on the flow sheet, the patient was questioned as to subjective redness, pruritus, pain, drainage, fatigue, or any other symptoms.  Objectively, the radiation area was evaluated with regards to erythema, atrophy, scale, crusting, erosion, ulceration, edema, purpura, tenderness, warmth, drainage, and any other findings.  The plan was extensively reviewed including the dose, and dosing schedule.  The simulation and clinical setup was also reviewed as was the external and any internal shields and based on this review the appropriateness and sufficiency of treatment was determined.
Comments: Excellent Clearance
Treatment Time / Fractionation (Optional- Include Units) Rx 2: 0.50min

## 2018-12-28 NOTE — PROCEDURE: TREATMENT REGIMEN
Plan: Per the request of Dr. Reid, patient was seen today for Superficial Radiation Therapy management.  A high frequency ultrasound image was acquired prior to treatment today for three dimensional evaluation of tumor volume and response to treatment, in addition, geometric accuracy of field placement (CPT®  ). Physician evaluation of the ultrasound tumor depth is ongoing through treatment, and is deemed medically necessary ensuring efficacy of treatment.\\n\\n2 weeks after finishing SRT, evaluation and management of SRT based on prescription and cumulative dose for reaction and response to radiation reveals adequate healing and response with pleasing aesthetics results.  No evidence of cancerous lesion on or around treatment site visible on ultrasound or dermoscopy. RTOG 0\\n  \\n\\nLeft upper cutaneous Lip \\nUs depth is 0.0mm, Repop +++, MANDO 0.0mm sq.\\n\\nLeft Proximal Dorsal Forearm\\nUs depth is 1.45mm, Repop +, MANDO equivocal\\n
Detail Level: Zone

## 2019-08-28 ENCOUNTER — APPOINTMENT (OUTPATIENT)
Age: 69
Setting detail: DERMATOLOGY
End: 2019-08-29

## 2019-08-28 DIAGNOSIS — L82.1 OTHER SEBORRHEIC KERATOSIS: ICD-10-CM

## 2019-08-28 DIAGNOSIS — D485 NEOPLASM OF UNCERTAIN BEHAVIOR OF SKIN: ICD-10-CM

## 2019-08-28 DIAGNOSIS — Z85.828 PERSONAL HISTORY OF OTHER MALIGNANT NEOPLASM OF SKIN: ICD-10-CM

## 2019-08-28 DIAGNOSIS — L57.0 ACTINIC KERATOSIS: ICD-10-CM

## 2019-08-28 DIAGNOSIS — Z71.89 OTHER SPECIFIED COUNSELING: ICD-10-CM

## 2019-08-28 DIAGNOSIS — L57.8 OTHER SKIN CHANGES DUE TO CHRONIC EXPOSURE TO NONIONIZING RADIATION: ICD-10-CM

## 2019-08-28 PROBLEM — D48.5 NEOPLASM OF UNCERTAIN BEHAVIOR OF SKIN: Status: ACTIVE | Noted: 2019-08-28

## 2019-08-28 PROCEDURE — 11103 TANGNTL BX SKIN EA SEP/ADDL: CPT

## 2019-08-28 PROCEDURE — OTHER LIQUID NITROGEN: OTHER

## 2019-08-28 PROCEDURE — OTHER COUNSELING: OTHER

## 2019-08-28 PROCEDURE — OTHER MIPS QUALITY: OTHER

## 2019-08-28 PROCEDURE — 17000 DESTRUCT PREMALG LESION: CPT | Mod: 59

## 2019-08-28 PROCEDURE — 99214 OFFICE O/P EST MOD 30 MIN: CPT | Mod: 25

## 2019-08-28 PROCEDURE — 11102 TANGNTL BX SKIN SINGLE LES: CPT

## 2019-08-28 PROCEDURE — OTHER SUNSCREEN RECOMMENDATIONS: OTHER

## 2019-08-28 PROCEDURE — OTHER BIOPSY BY SHAVE METHOD: OTHER

## 2019-08-28 ASSESSMENT — LOCATION DETAILED DESCRIPTION DERM
LOCATION DETAILED: RIGHT MEDIAL CANTHUS
LOCATION DETAILED: RIGHT LATERAL TRAPEZIAL NECK
LOCATION DETAILED: RIGHT RADIAL DORSAL HAND
LOCATION DETAILED: LEFT DISTAL PRETIBIAL REGION
LOCATION DETAILED: RIGHT INFERIOR UPPER BACK
LOCATION DETAILED: INFERIOR THORACIC SPINE
LOCATION DETAILED: NASAL SUPRATIP
LOCATION DETAILED: RIGHT PROXIMAL RADIAL DORSAL FOREARM
LOCATION DETAILED: LEFT SUPERIOR UPPER BACK

## 2019-08-28 ASSESSMENT — LOCATION ZONE DERM
LOCATION ZONE: NECK
LOCATION ZONE: LEG
LOCATION ZONE: NOSE
LOCATION ZONE: TRUNK
LOCATION ZONE: EYELID
LOCATION ZONE: HAND
LOCATION ZONE: ARM

## 2019-08-28 ASSESSMENT — LOCATION SIMPLE DESCRIPTION DERM
LOCATION SIMPLE: RIGHT HAND
LOCATION SIMPLE: NOSE
LOCATION SIMPLE: LEFT UPPER BACK
LOCATION SIMPLE: LEFT PRETIBIAL REGION
LOCATION SIMPLE: RIGHT EYELID
LOCATION SIMPLE: UPPER BACK
LOCATION SIMPLE: POSTERIOR NECK
LOCATION SIMPLE: RIGHT FOREARM
LOCATION SIMPLE: RIGHT UPPER BACK

## 2019-08-28 NOTE — PROCEDURE: LIQUID NITROGEN
Post-Care Instructions: I reviewed with the patient in detail post-care instructions. Patient is to wear sunprotection, and avoid picking at any of the treated lesions. Pt may apply Vaseline to crusted or scabbing areas.
Detail Level: Detailed
Consent: The patient's consent was obtained including but not limited to risks of crusting, scabbing, blistering, scarring, darker or lighter pigmentary change, recurrence, incomplete removal and infection.
Render Post-Care Instructions In Note?: no
Duration Of Freeze Thaw-Cycle (Seconds): 2
Number Of Freeze-Thaw Cycles: 2 freeze-thaw cycles

## 2019-08-28 NOTE — PROCEDURE: BIOPSY BY SHAVE METHOD
Billing Type: Third-Party Bill
Size Of Lesion In Cm: 0.2
Dressing: bandage
Biopsy Type: H and E
Anesthesia Volume In Cc: 0.5
Consent: Written consent was obtained and risks were reviewed including but not limited to scarring, infection, bleeding, scabbing, incomplete removal, nerve damage and allergy to anesthesia. Intent of procedure was to obtain tissue sample for histopathic examination. Clinical evaluation reveals changes suspicious for rule out provided in path req. A skin biopsy is considered a necessary and appropriate to clarify the diagnosis.
Wound Care: Bacitracin
Electrodesiccation Text: The wound bed was treated with electrodesiccation after the biopsy was performed.
Electrodesiccation And Curettage Text: The wound bed was treated with electrodesiccation and curettage after the biopsy was performed.
Render Path Notes In Note?: No
Silver Nitrate Text: The wound bed was treated with silver nitrate after the biopsy was performed.
Was A Bandage Applied: Yes
Depth Of Biopsy: dermis
Hemostasis: Drysol
Post-Care Instructions: I reviewed with the patient in detail post-care instructions. Patient is to keep the biopsy site dry overnight, and then apply bacitracin twice daily until healed. Patient may apply hydrogen peroxide soaks to remove any crusting.
Cryotherapy Text: The wound bed was treated with cryotherapy after the biopsy was performed.
Size Of Lesion In Cm: 0.4
Notification Instructions: Patient will be notified of biopsy results. However, patient instructed to call the office if not contacted within 2 weeks.
Biopsy Method: Acu-Razor
X Size Of Lesion In Cm: 0
Detail Level: Detailed
Curettage Text: The wound bed was treated with curettage after the biopsy was performed.
Type Of Destruction Used: Curettage

## 2019-09-19 ENCOUNTER — APPOINTMENT (OUTPATIENT)
Age: 69
Setting detail: DERMATOLOGY
End: 2019-09-24

## 2019-09-19 PROBLEM — E05.90 THYROTOXICOSIS, UNSPECIFIED WITHOUT THYROTOXIC CRISIS OR STORM: Status: ACTIVE | Noted: 2019-09-19

## 2019-09-19 PROBLEM — C44.1122 BASAL CELL CARCINOMA OF SKIN OF RIGHT LOWER EYELID, INCLUDING CANTHUS: Status: ACTIVE | Noted: 2019-09-19

## 2019-09-19 PROBLEM — C44.719 BASAL CELL CARCINOMA OF SKIN OF LEFT LOWER LIMB, INCLUDING HIP: Status: ACTIVE | Noted: 2019-09-19

## 2019-09-19 PROCEDURE — 77262 THER RADIOLOGY TX PLNG INTRM: CPT

## 2019-09-19 PROCEDURE — 77300 RADIATION THERAPY DOSE PLAN: CPT

## 2019-09-19 PROCEDURE — 99213 OFFICE O/P EST LOW 20 MIN: CPT | Mod: 25

## 2019-09-19 PROCEDURE — 77334 RADIATION TREATMENT AID(S): CPT

## 2019-09-19 PROCEDURE — OTHER FOLLOW UP FOR NEXT VISIT: OTHER

## 2019-09-19 PROCEDURE — OTHER TREATMENT REGIMEN: OTHER

## 2019-09-19 PROCEDURE — 77332 RADIATION TREATMENT AID(S): CPT

## 2019-09-19 PROCEDURE — 77285 THER RAD SIMULAJ FIELD INTRM: CPT

## 2019-09-19 PROCEDURE — OTHER SUPERFICIAL RADIATION TREATMENT: OTHER

## 2019-09-19 PROCEDURE — G6001 ECHO GUIDANCE RADIOTHERAPY: HCPCS

## 2019-09-19 NOTE — PROCEDURE: SUPERFICIAL RADIATION TREATMENT
Field Size (Applicator) Rx 2: 2.0 cm
Bill For Simulation And Treatment Device Design: Yes
Simple Simulation Afterword Text Will Be Included With Simple Simulations (Indications............): The patient had a complete consultation regarding all applicable modalities for the treatment of their skin cancer and based on a variety of factors including the type of tumor, size, and location, the relevant medical history as well as local tissue factors, the functional status of the individual, the ability to perform necessary postoperative wound instructions and the need for simultaneous treatments as well as overall wound healing status, it was determined that the patient would begin radiation therapy treatment for skin cancer.  A full simulation and treatment device design was performed including the determination and formulation of appropriate simple and complex devices including lead shield of 0.762 mm thickness to form molded customized shielding to specifically correlate with the lesion size including treatment margin.  The custom lead shield is adequate to accommodate the appropriate applicator and provide adequate shielding around the treatment site.  The specific field applicator, shields, and devices both simple and complex as well as the specific patient setup is outlined below.  The patient was given a full consent for superficial radiation to both verbally and in writing and the full determination of patient's eligibility for treatment and selection is outlined on the patient eligibility and treatment selection form.  The specific superficial radiotherapy prescription was determined and was documented on the superficial radiotherapy prescription form.  A treatment calculation was also performed and documented on the treatment calculation form.  Based on the prescription, the patient was scheduled for a series of fractional treatments.
Bill And Render Text From Evaluation And Management Tab (Will Bill [id number]): No
Detail Level: Detailed
Total Number Of Fractions Rx 3: 15
Treatment Time / Fractionation (Optional- Include Units) Rx 2: min
Dose / Tx In Cgy (Optional): 261.6
Energy (Optional-Please Include Units): 70kV
Time Dose Fractionation (Optional- Include Units If Applicable): 95
Number Of Days Off Treatment: 1
Daily Fractionated Dose (Optional- Include Units): 268.75cGy
Dose Per Fractionation In Cgy (Optional): 268.75
Daily Fractionated Dose (Optional- Include Units) Rx 2: 268.56cGy
Computed Treatment Time In Min (Will Render The Same As Calculated Treatment Time If Left Blank): 0.37
Fractionation Number: 0
Fractionation Number (Evaluation): 5
Custom Shielding Preamble Text Will Not Be Included With Simple Simulations (.......... X X Y Cm): A lead shield of 0.762 mm thickness is utilized to form a molded, custom shield with a
Total Dose (Optional-Please Include Units) Rx 2: 2685.6cGy
Intro Statement (Will Not Render If Left Blank): The patient is undergoing superficial radiation therapy for skin cancer and presents for weekly evaluation and management.  Per protocol and as documented on the flow sheet, the patient was questioned as to subjective redness, pruritus, pain, drainage, fatigue, or any other symptoms.  Objectively, the radiation area was evaluated with regards to erythema, atrophy, scale, crusting, erosion, ulceration, edema, purpura, tenderness, warmth, drainage, and any other findings.  The plan was extensively reviewed including the dose, and dosing schedule.  The simulation and clinical setup was also reviewed as was the external and any internal shields and based on this review the appropriateness and sufficiency of treatment was determined.
Energy (Optional-Please Include Units): 50kV
Treatment Margins In Cm: 0.5
Daily Fractionated Dose (Optional- Include Units) Rx 2: cGy
Patient Positioning: Supine
Total Dose (Optional-Please Include Units): 2687.5cGy
Functional Status: 0 (fully active)
Total Number Of Fractions: 20
Fractions / Week Rx 2: 3
Treatment Device Design After Initial Simulation Justification (Will Render If Bill For Treatment Devices = Yes): The patient is status post radiation simulation and is evaluated as to the use of additional devices for shielding and placement for radiation therapy.
Custom Shielding Afterword Text Will Not Be Included With Simple Simulations (X X Y Cm............): port to correlate with the lesion size, including treatment margin. The custom lead shield is adequate to accommodate the appropriate applicator and provide adequate shielding around the treatment site. Additional shielding (as noted below) is used to protect sensitive, normal tissues.
Computed Treatment Time In Min (Will Render The Same As Calculated Treatment Time If Left Blank): 0.43
Time Dose Fractionation (Optional- Include Units If Applicable): 47
Treatment Time In Min (Optional): 0.40
Simple Simulation Preamble Text Will Be Included With Simple Simulations (.......... Indications): Simple simulation was performed today for the following reasons:
Field Size (Applicator): 1.5 cm
Assessment: Appropriate reaction
Daily Fractionated Dose (Optional- Include Units): 269.75cGy
Depth (Optional-Please Include Units): 1.35mm
Shielding Size (Optional- Include Units) Rx 2: 1.5cm x 1.5cm
Total Number Of Fractions Rx 2: 10
Dose Per Fractionation In Cgy (Optional): 269.73
Shielding Size (Optional- Include Units): 1.5cm x1.5cm
Pathology Override (Pathology Will Render As Diagnosis Name If Left Blank): Superficial Basal Cell Carcinoma
Pathology Override (Pathology Will Render As Diagnosis Name If Left Blank): Basal Cell Carcinoma Nodular
Total Dose (Optional-Please Include Units): 5394.6cGy
Treatment Time / Fractionation (Optional- Include Units) Rx 2: 0.36min
Treatment Time / Fractionation (Optional- Include Units): 0.43min
Field Number: 2
Treatment Time / Fractionation (Optional- Include Units): 0.37min

## 2019-09-19 NOTE — PROCEDURE: TREATMENT REGIMEN
Detail Level: Zone
Plan: Per the request of Dr. Reid, patient was seen today for Superficial Radiation Therapy requiring simulation (CPT® 44324) in preparation for treatment of specific diseased site(s). Simulation is necessary to determine correct patient and treatment portal positioning, deliver safe and effective radiation therapy. Today’s setup was evaluated determining continuation of treatment with the current plan, or necessary changes as appropriate. All appropriate custom blocking and treatment parameters verified by the Radiation Therapist\\nToday’s visit is for Simulation and planning for radiation therapy.  Question were answered and concerns were address.  Patient was evaluated based on listed criteria and is a suitable candidate to begin SRT.  \\n\\n
Plan: Per the request of Dr. Reid, patient was seen today for Superficial Radiation Therapy requiring simulation (CPT® 49564) in preparation for treatment of specific diseased site(s). Simulation is necessary to determine correct patient and treatment portal positioning, deliver safe and effective radiation therapy. A high frequency ultrasound image was acquired today for three dimensional evaluation of tumor volume and response to treatment, in addition, geometric accuracy of field placement (CPT®  ). Physician evaluation of the ultrasound tumor depth will be ongoing through course of treatment, and is deemed medically necessary ensuring efficacy of treatment. Today’s image and setup was evaluated determining continuation of treatment with the current plan, or necessary changes as appropriate. All appropriate custom blocking and treatment parameters verified by the Radiation Therapist\\n\\nToday’s visit is for Simulation and planning for radiation therapy.  Question were answered and concerns were address.  Patient was evaluated based on listed criteria and is a suitable candidate to begin SRT.  Ultrasound was used to confirm treatment location and determine depth of treatment.  Depth: 1.35mm\\n

## 2019-09-23 ENCOUNTER — APPOINTMENT (OUTPATIENT)
Age: 69
Setting detail: DERMATOLOGY
End: 2019-09-24

## 2019-09-23 PROBLEM — C44.719 BASAL CELL CARCINOMA OF SKIN OF LEFT LOWER LIMB, INCLUDING HIP: Status: ACTIVE | Noted: 2019-09-23

## 2019-09-23 PROBLEM — L29.8 OTHER PRURITUS: Status: ACTIVE | Noted: 2019-09-23

## 2019-09-23 PROBLEM — C44.1122 BASAL CELL CARCINOMA OF SKIN OF RIGHT LOWER EYELID, INCLUDING CANTHUS: Status: ACTIVE | Noted: 2019-09-23

## 2019-09-23 PROCEDURE — 77401 RADIATION TX DELIVERY SUPFC: CPT

## 2019-09-23 PROCEDURE — 77280 THER RAD SIMULAJ FIELD SMPL: CPT

## 2019-09-23 PROCEDURE — OTHER TREATMENT REGIMEN: OTHER

## 2019-09-23 PROCEDURE — OTHER FOLLOW UP FOR NEXT VISIT: OTHER

## 2019-09-23 PROCEDURE — OTHER SUPERFICIAL RADIATION TREATMENT: OTHER

## 2019-09-23 PROCEDURE — G6001 ECHO GUIDANCE RADIOTHERAPY: HCPCS

## 2019-09-23 NOTE — PROCEDURE: SUPERFICIAL RADIATION TREATMENT
Prescription Used: 1
Initial Radiation Treatment Planning (Will Render If Bill Simulation = Yes): The patient had a complete consultation regarding all applicable modalities for the treatment of their skin cancer and based on a variety of factors including the type of tumor, size, and location, the relevant medical history as well as local tissue factors, the functional status of the individual, the ability to perform necessary postoperative wound instructions and the need for simultaneous treatments as well as overall wound healing status, it was determined that the patient would begin radiation therapy treatment for skin cancer.  A full simulation and treatment device design was performed including the determination and formulation of appropriate simple and complex devices including lead shield of 0.762 mm thickness to form molded customized shielding to specifically correlate with the lesion size including treatment margin.  The custom lead shield is adequate to accommodate the appropriate applicator and provide adequate shielding around the treatment site.  The specific field applicator, shields, and devices both simple and complex as well as the specific patient setup is outlined below.  The patient was given a full consent for superficial radiation to both verbally and in writing and the full determination of patient's eligibility for treatment and selection is outlined on the patient eligibility and treatment selection form.  The specific superficial radiotherapy prescription was determined and was documented on the superficial radiotherapy prescription form.  A treatment calculation was also performed and documented on the treatment calculation form.  Based on the prescription, the patient was scheduled for a series of fractional treatments.
Bill For Dosimetry/Render Treatment Time Calculation In Note: No
Treatment Time In Min (Optional): 0.43
Dose / Tx In Cgy (Optional): 269.73
Field Size (Applicator): 2.0 cm
Dimensions-Y Axis In Cm: 0.5
Shielding Size (Optional- Include Units) Rx 2: 1.5cm x 1.5cm
Day Of The Week Treatment Administered: Monday
Dose / Tx In Cgy (Optional): 268.75
Pathology Override (Pathology Will Render As Diagnosis Name If Left Blank): Superficial Basal Cell Carcinoma
Daily Fractionated Dose (Optional- Include Units): 268.75cGy
Custom Shielding Preamble Text Will Not Be Included With Simple Simulations (.......... X X Y Cm): A lead shield of 0.762 mm thickness is utilized to form a molded, custom shield with a
Total Number Of Fractions Rx 4: 15
Depth (Optional-Please Include Units) Rx 2: 1.35mm
Bill For Radiation Treatment: Yes
Time Dose Fractionation (Optional- Include Units If Applicable): 95
Treatment Time / Fractionation (Optional- Include Units): 0.37min
Treatment Time / Fractionation (Optional- Include Units) Rx 2: min
Field Number: 2
Detail Level: Detailed
Daily Fractionated Dose (Optional- Include Units): 269.75cGy
Field Size (Applicator): 1.5 cm
Time Dose Fractionation (Optional- Include Units If Applicable): 47
Energy (Include Units): 50Kv
Total Number Of Fractions Rx 2: 10
Computed Treatment Time In Min (Will Render The Same As Calculated Treatment Time If Left Blank): 0.37
Fractions / Week Rx 3: 5
Energy (Optional-Please Include Units): 70kV
Fractions / Week: 3
Shielding Size (Optional- Include Units): 1.5cm x1.5cm
Patient Positioning: Supine
Custom Shielding Afterword Text Will Not Be Included With Simple Simulations (X X Y Cm............): port to correlate with the lesion size, including treatment margin. The custom lead shield is adequate to accommodate the appropriate applicator and provide adequate shielding around the treatment site. Additional shielding (as noted below) is used to protect sensitive, normal tissues.
Assessment: Appropriate reaction
Pathology Override (Pathology Will Render As Diagnosis Name If Left Blank): Basal Cell Carcinoma Nodular
Total Dose (Optional-Please Include Units) Rx 2: 2685.6cGy
Treatment Device Design After Initial Simulation Justification (Will Render If Bill For Treatment Devices = Yes): The patient is status post radiation simulation and is evaluated as to the use of additional devices for shielding and placement for radiation therapy.
Total Number Of Fractions: 20
Total Dose (Optional-Please Include Units): 2687.5cGy
Daily Fractionated Dose (Optional- Include Units) Rx 2: 268.56cGy
Total Dose (Optional-Please Include Units) Rx 2: cGy
Treatment Time / Fractionation (Optional- Include Units) Rx 2: 0.36min
Simple Simulation Preamble Text Will Be Included With Simple Simulations (.......... Indications): Simple simulation was performed today for the following reasons:
Total Dose (Optional-Please Include Units): 5394.6cGy
Functional Status: 0 (fully active)
Treatment Time / Fractionation (Optional- Include Units): 0.43min
Intro Statement (Will Not Render If Left Blank): The patient is undergoing superficial radiation therapy for skin cancer and presents for weekly evaluation and management.  Per protocol and as documented on the flow sheet, the patient was questioned as to subjective redness, pruritus, pain, drainage, fatigue, or any other symptoms.  Objectively, the radiation area was evaluated with regards to erythema, atrophy, scale, crusting, erosion, ulceration, edema, purpura, tenderness, warmth, drainage, and any other findings.  The plan was extensively reviewed including the dose, and dosing schedule.  The simulation and clinical setup was also reviewed as was the external and any internal shields and based on this review the appropriateness and sufficiency of treatment was determined.

## 2019-09-23 NOTE — PROCEDURE: TREATMENT REGIMEN
Plan: This patient has been treated today with image guided superficial radiation therapy for non-melanoma skin cancer. Written informed consent has been previously obtained from this patient for this treatment. This consent is documented in the patient’s chart. The patient gave verbal consent to continue treatment today.\\n\\n\\nThe patient was treated with a specific radiation dose and setup as prescribed by the provider listed on this visit note. A Radiation Therapist performed administration of radiation under supervision of provider. The treatment parameters and cumulative dose are indicated above.\\n\\n\\nPrior to administering the radiation, the patient underwent a verification therapeutic radiology simulation-aided field setting defining relevant normal and abnormal target anatomy and acquiring images with high frequency ultrasound in addition to data necessary developing optimal radiation treatment process for the patient. This process includes verification of the treatment port(s) and proper treatment positioning. All treatment ports were photographed within electronic medical record. The patient’s customized lead blocking along with gross tumor volume and margin was confirmed. Considering superficial radiotherapy is clinical in setup, this requires physician and radiation therapist to clarify location interest being treated against initial images, pathology and patient anatomy. Care was taken ensuring fields treated were geometrically accurate and properly positioned using therapeutic radiology simulation-aided field setting verification per fraction. This process is also utilized to determine if any prescription or setup changes are necessary. These steps are therefore medically necessary ensuring safe and effective administration of radiation. Ongoing therapeutic radiology simulation-aided field setting verification is ordered throughout course of therapy.\\n\\n\\nA high frequency ultrasound image was acquired today for two-dimensional evaluation of the tumor volume and response to treatment, in addition to geometric accuracy of field placement. \\n\\nUS depth Is 0.93mm, which is 0.42mm in difference from previous imaging. Repop +\\n\\nThe field placement and ultrasound imaging, per fraction, is separate and distinct from the initial simulation, and is an important task in providing safe administration of superficial radiation therapy. Physician evaluation of the ultrasound tumor depth will be ongoing throughout the course of treatment and is deemed medically necessary in order to ensure the efficacy of treatment and any necessary changes. Today’s image was evaluated for determination of continuation of treatment with the current plan or with necessary changes as appropriate. \\n\\n\\nAccording to provider review of verification therapeutic radiology simulation-aided field setting and imaging, NO  change is required. Additionally, the use of ultrasound visualization and targeted assessment allows the patient to be able to see their cancer(s) progress, encouraging patient to complete and maintain compliance through full course of radiotherapy.\\n\\nPer Dr. Reid, continued ultrasound guidance and therapeutic radiology simulation-aided field setting verification per fraction is required for field placement, measurement of tumor depth, progress and acute effect monitoring.\\n
Detail Level: Zone
Plan: This patient has been treated today superficial radiation therapy for non-melanoma skin cancer. Written informed consent has been previously obtained from this patient for this treatment. This consent is documented in the patient’s chart. The patient gave verbal consent to continue treatment today.\\n\\n\\nThe patient was treated with a specific radiation dose and setup as prescribed by the provider listed on this visit note. A Radiation Therapist performed administration of radiation under supervision of provider. The treatment parameters and cumulative dose are indicated above.\\n\\n\\nPrior to administering the radiation, the patient underwent a verification therapeutic radiology simulation-aided field setting defining relevant normal and abnormal target anatomy and acquiring data necessary developing optimal radiation treatment process for the patient. This process includes verification of the treatment port(s) and proper treatment positioning. All treatment ports were photographed within electronic medical record. The patient’s customized lead blocking along with gross tumor volume and margin was confirmed. Considering superficial radiotherapy is clinical in setup, this requires physician and radiation therapist to clarify location interest being treated against initial images, pathology and patient anatomy. Care was taken ensuring fields treated were geometrically accurate and properly positioned using therapeutic radiology simulation-aided field setting verification per fraction. This process is also utilized to determine if any prescription or setup changes are necessary. These steps are therefore medically necessary ensuring safe and effective administration of radiation. Ongoing therapeutic radiology simulation-aided field setting verification is ordered throughout course of therapy.\\n\\nThe field placement, per fraction, is separate and distinct from the initial simulation, and is an important task in providing safe administration of superficial radiation therapy. Physician evaluation will be ongoing throughout the course of treatment and is deemed medically necessary in order to ensure the efficacy of treatment and any necessary changes. \\n\\nAccording to provider review of verification therapeutic radiology simulation-aided field setting and imaging, NO change is required \\n\\nPer Dr. Reid, continued therapeutic radiology simulation-aided field setting verification per fraction is required for field placement, progress and acute effect monitoring.\\n\\n\\nNo US due to location

## 2019-09-25 ENCOUNTER — APPOINTMENT (OUTPATIENT)
Age: 69
Setting detail: DERMATOLOGY
End: 2019-10-01

## 2019-09-25 PROBLEM — C44.719 BASAL CELL CARCINOMA OF SKIN OF LEFT LOWER LIMB, INCLUDING HIP: Status: ACTIVE | Noted: 2019-09-25

## 2019-09-25 PROBLEM — C44.1122 BASAL CELL CARCINOMA OF SKIN OF RIGHT LOWER EYELID, INCLUDING CANTHUS: Status: ACTIVE | Noted: 2019-09-25

## 2019-09-25 PROCEDURE — OTHER TREATMENT REGIMEN: OTHER

## 2019-09-25 PROCEDURE — OTHER SUPERFICIAL RADIATION TREATMENT: OTHER

## 2019-09-25 PROCEDURE — OTHER FOLLOW UP FOR NEXT VISIT: OTHER

## 2019-09-25 PROCEDURE — 77401 RADIATION TX DELIVERY SUPFC: CPT

## 2019-09-25 PROCEDURE — 77280 THER RAD SIMULAJ FIELD SMPL: CPT

## 2019-09-25 PROCEDURE — G6001 ECHO GUIDANCE RADIOTHERAPY: HCPCS

## 2019-09-25 NOTE — PROCEDURE: SUPERFICIAL RADIATION TREATMENT
Fractionation Number (Evaluation): 5
Fractionation Number: 2
Total Number Of Fractions: 10
Dimensions-Y Axis In Cm: 1
Daily Fractionated Dose (Optional- Include Units) Rx 2: 268.56cGy
Render Additional Prescriptions In Note?: No
Time Dose Fractionation (Optional- Include Units If Applicable) Rx 2: 47
Daily Fractionated Dose (Optional- Include Units): 268.75cGy
Total Number Of Fractions: 20
Energy (Optional-Please Include Units): 50kV
Custom Shielding Afterword Text Will Not Be Included With Simple Simulations (X X Y Cm............): port to correlate with the lesion size, including treatment margin. The custom lead shield is adequate to accommodate the appropriate applicator and provide adequate shielding around the treatment site. Additional shielding (as noted below) is used to protect sensitive, normal tissues.
Treatment Time / Fractionation (Optional- Include Units) Rx 2: 0.36min
Treatment Margins In Cm: 0.5
Detail Level: Detailed
Field Size (Applicator): 2.0 cm
Dose / Tx In Cgy (Optional): 268.75
Fractions / Week: 3
Dose / Tx In Cgy (Optional): 269.73
Treatment Time / Fractionation (Optional- Include Units): 0.37min
Intro Statement (Will Not Render If Left Blank): The patient is undergoing superficial radiation therapy for skin cancer and presents for weekly evaluation and management.  Per protocol and as documented on the flow sheet, the patient was questioned as to subjective redness, pruritus, pain, drainage, fatigue, or any other symptoms.  Objectively, the radiation area was evaluated with regards to erythema, atrophy, scale, crusting, erosion, ulceration, edema, purpura, tenderness, warmth, drainage, and any other findings.  The plan was extensively reviewed including the dose, and dosing schedule.  The simulation and clinical setup was also reviewed as was the external and any internal shields and based on this review the appropriateness and sufficiency of treatment was determined.
Cumulative Dose In Cgy (Optional): 539.46
Cumulative Dose In Cgy (Optional): 537.5
Depth (Optional-Please Include Units) Rx 2: 1.35mm
Simple Simulation Preamble Text Will Be Included With Simple Simulations (.......... Indications): Simple simulation was performed today for the following reasons:
Daily Fractionated Dose (Optional- Include Units) Rx 2: cGy
Treatment Time In Min (Optional): 0.37
Shielding Size (Optional- Include Units): 1.5cm x1.5cm
Field Size (Applicator): 1.5 cm
Total Number Of Fractions Rx 4: 15
Patient Positioning: Supine
Treatment Time In Min (Optional): 0.43
Day Of The Week Treatment Administered: Wednesday
Initial Radiation Treatment Planning (Will Render If Bill Simulation = Yes): The patient had a complete consultation regarding all applicable modalities for the treatment of their skin cancer and based on a variety of factors including the type of tumor, size, and location, the relevant medical history as well as local tissue factors, the functional status of the individual, the ability to perform necessary postoperative wound instructions and the need for simultaneous treatments as well as overall wound healing status, it was determined that the patient would begin radiation therapy treatment for skin cancer.  A full simulation and treatment device design was performed including the determination and formulation of appropriate simple and complex devices including lead shield of 0.762 mm thickness to form molded customized shielding to specifically correlate with the lesion size including treatment margin.  The custom lead shield is adequate to accommodate the appropriate applicator and provide adequate shielding around the treatment site.  The specific field applicator, shields, and devices both simple and complex as well as the specific patient setup is outlined below.  The patient was given a full consent for superficial radiation to both verbally and in writing and the full determination of patient's eligibility for treatment and selection is outlined on the patient eligibility and treatment selection form.  The specific superficial radiotherapy prescription was determined and was documented on the superficial radiotherapy prescription form.  A treatment calculation was also performed and documented on the treatment calculation form.  Based on the prescription, the patient was scheduled for a series of fractional treatments.
Functional Status: 0 (fully active)
Assessment: Appropriate reaction
Energy (Include Units): 70Kv
Shielding Size (Optional- Include Units) Rx 2: 1.5cm x 1.5cm
Treatment Device Design After Initial Simulation Justification (Will Render If Bill For Treatment Devices = Yes): The patient is status post radiation simulation and is evaluated as to the use of additional devices for shielding and placement for radiation therapy.
Total Dose (Optional-Please Include Units) Rx 2: 2685.6cGy
Custom Shielding Preamble Text Will Not Be Included With Simple Simulations (.......... X X Y Cm): A lead shield of 0.762 mm thickness is utilized to form a molded, custom shield with a
Treatment Time / Fractionation (Optional- Include Units) Rx 2: min
Pathology Override (Pathology Will Render As Diagnosis Name If Left Blank): Basal Cell Carcinoma Nodular
Time Dose Fractionation (Optional- Include Units If Applicable): 95
Treatment Time / Fractionation (Optional- Include Units): 0.43min
Bill For Radiation Treatment: Yes
Total Dose (Optional-Please Include Units): 2687.5cGy
Pathology Override (Pathology Will Render As Diagnosis Name If Left Blank): Superficial Basal Cell Carcinoma
Daily Fractionated Dose (Optional- Include Units): 269.75cGy
Total Dose (Optional-Please Include Units): 5394.6cGy

## 2019-09-25 NOTE — PROCEDURE: TREATMENT REGIMEN
Detail Level: Zone
Plan: This patient has been treated today with image guided superficial radiation therapy for non-melanoma skin cancer. Written informed consent has been previously obtained from this patient for this treatment. This consent is documented in the patient’s chart. The patient gave verbal consent to continue treatment today.\\n\\n\\nThe patient was treated with a specific radiation dose and setup as prescribed by the provider listed on this visit note. A Radiation Therapist performed administration of radiation under supervision of provider. The treatment parameters and cumulative dose are indicated above.\\n\\n\\nPrior to administering the radiation, the patient underwent a verification therapeutic radiology simulation-aided field setting defining relevant normal and abnormal target anatomy and acquiring images with high frequency ultrasound in addition to data necessary developing optimal radiation treatment process for the patient. This process includes verification of the treatment port(s) and proper treatment positioning. All treatment ports were photographed within electronic medical record. The patient’s customized lead blocking along with gross tumor volume and margin was confirmed. Considering superficial radiotherapy is clinical in setup, this requires physician and radiation therapist to clarify location interest being treated against initial images, pathology and patient anatomy. Care was taken ensuring fields treated were geometrically accurate and properly positioned using therapeutic radiology simulation-aided field setting verification per fraction. This process is also utilized to determine if any prescription or setup changes are necessary. These steps are therefore medically necessary ensuring safe and effective administration of radiation. Ongoing therapeutic radiology simulation-aided field setting verification is ordered throughout course of therapy.\\n\\n\\nA high frequency ultrasound image was acquired today for two-dimensional evaluation of the tumor volume and response to treatment, in addition to geometric accuracy of field placement. \\n\\nUS depth Is 0.78mm, which is -0.15mm in difference from previous imaging. Repop +\\n\\nThe field placement and ultrasound imaging, per fraction, is separate and distinct from the initial simulation, and is an important task in providing safe administration of superficial radiation therapy. Physician evaluation of the ultrasound tumor depth will be ongoing throughout the course of treatment and is deemed medically necessary in order to ensure the efficacy of treatment and any necessary changes. Today’s image was evaluated for determination of continuation of treatment with the current plan or with necessary changes as appropriate. \\n\\n\\nAccording to provider review of verification therapeutic radiology simulation-aided field setting and imaging, NO  change is required. Additionally, the use of ultrasound visualization and targeted assessment allows the patient to be able to see their cancer(s) progress, encouraging patient to complete and maintain compliance through full course of radiotherapy.\\n\\nPer Dr. Reid, continued ultrasound guidance and therapeutic radiology simulation-aided field setting verification per fraction is required for field placement, measurement of tumor depth, progress and acute effect monitoring.\\n
Plan: This patient has been treated today superficial radiation therapy for non-melanoma skin cancer. Written informed consent has been previously obtained from this patient for this treatment. This consent is documented in the patient’s chart. The patient gave verbal consent to continue treatment today.\\n\\n\\nThe patient was treated with a specific radiation dose and setup as prescribed by the provider listed on this visit note. A Radiation Therapist performed administration of radiation under supervision of provider. The treatment parameters and cumulative dose are indicated above.\\n\\n\\nPrior to administering the radiation, the patient underwent a verification therapeutic radiology simulation-aided field setting defining relevant normal and abnormal target anatomy and acquiring data necessary developing optimal radiation treatment process for the patient. This process includes verification of the treatment port(s) and proper treatment positioning. All treatment ports were photographed within electronic medical record. The patient’s customized lead blocking along with gross tumor volume and margin was confirmed. Considering superficial radiotherapy is clinical in setup, this requires physician and radiation therapist to clarify location interest being treated against initial images, pathology and patient anatomy. Care was taken ensuring fields treated were geometrically accurate and properly positioned using therapeutic radiology simulation-aided field setting verification per fraction. This process is also utilized to determine if any prescription or setup changes are necessary. These steps are therefore medically necessary ensuring safe and effective administration of radiation. Ongoing therapeutic radiology simulation-aided field setting verification is ordered throughout course of therapy.\\n\\nThe field placement, per fraction, is separate and distinct from the initial simulation, and is an important task in providing safe administration of superficial radiation therapy. Physician evaluation will be ongoing throughout the course of treatment and is deemed medically necessary in order to ensure the efficacy of treatment and any necessary changes. \\n\\nAccording to provider review of verification therapeutic radiology simulation-aided field setting and imaging, NO change is required \\n\\nPer Dr. Reid, continued therapeutic radiology simulation-aided field setting verification per fraction is required for field placement, progress and acute effect monitoring.\\n\\n\\nNo US due to location

## 2019-09-27 ENCOUNTER — APPOINTMENT (OUTPATIENT)
Age: 69
Setting detail: DERMATOLOGY
End: 2019-10-01

## 2019-09-27 PROBLEM — C44.1122 BASAL CELL CARCINOMA OF SKIN OF RIGHT LOWER EYELID, INCLUDING CANTHUS: Status: ACTIVE | Noted: 2019-09-27

## 2019-09-27 PROBLEM — C44.719 BASAL CELL CARCINOMA OF SKIN OF LEFT LOWER LIMB, INCLUDING HIP: Status: ACTIVE | Noted: 2019-09-27

## 2019-09-27 PROCEDURE — G6001 ECHO GUIDANCE RADIOTHERAPY: HCPCS

## 2019-09-27 PROCEDURE — OTHER TREATMENT REGIMEN: OTHER

## 2019-09-27 PROCEDURE — 77401 RADIATION TX DELIVERY SUPFC: CPT

## 2019-09-27 PROCEDURE — 77280 THER RAD SIMULAJ FIELD SMPL: CPT

## 2019-09-27 PROCEDURE — OTHER FOLLOW UP FOR NEXT VISIT: OTHER

## 2019-09-27 PROCEDURE — OTHER SUPERFICIAL RADIATION TREATMENT: OTHER

## 2019-09-27 NOTE — PROCEDURE: TREATMENT REGIMEN
Detail Level: Zone
Plan: This patient has been treated today with image guided superficial radiation therapy for non-melanoma skin cancer. Written informed consent has been previously obtained from this patient for this treatment. This consent is documented in the patient’s chart. The patient gave verbal consent to continue treatment today.\\n\\n\\nThe patient was treated with a specific radiation dose and setup as prescribed by the provider listed on this visit note. A Radiation Therapist performed administration of radiation under supervision of provider. The treatment parameters and cumulative dose are indicated above.\\n\\n\\nPrior to administering the radiation, the patient underwent a verification therapeutic radiology simulation-aided field setting defining relevant normal and abnormal target anatomy and acquiring images with high frequency ultrasound in addition to data necessary developing optimal radiation treatment process for the patient. This process includes verification of the treatment port(s) and proper treatment positioning. All treatment ports were photographed within electronic medical record. The patient’s customized lead blocking along with gross tumor volume and margin was confirmed. Considering superficial radiotherapy is clinical in setup, this requires physician and radiation therapist to clarify location interest being treated against initial images, pathology and patient anatomy. Care was taken ensuring fields treated were geometrically accurate and properly positioned using therapeutic radiology simulation-aided field setting verification per fraction. This process is also utilized to determine if any prescription or setup changes are necessary. These steps are therefore medically necessary ensuring safe and effective administration of radiation. Ongoing therapeutic radiology simulation-aided field setting verification is ordered throughout course of therapy.\\n\\n\\nA high frequency ultrasound image was acquired today for two-dimensional evaluation of the tumor volume and response to treatment, in addition to geometric accuracy of field placement. \\n\\nUS depth is 0.93mm, which is 0.15mm in difference from previous imaging. Repop +\\n\\nThe field placement and ultrasound imaging, per fraction, is separate and distinct from the initial simulation, and is an important task in providing safe administration of superficial radiation therapy. Physician evaluation of the ultrasound tumor depth will be ongoing throughout the course of treatment and is deemed medically necessary in order to ensure the efficacy of treatment and any necessary changes. Today’s image was evaluated for determination of continuation of treatment with the current plan or with necessary changes as appropriate. \\n\\n\\nAccording to provider review of verification therapeutic radiology simulation-aided field setting and imaging, NO  change is required. Additionally, the use of ultrasound visualization and targeted assessment allows the patient to be able to see their cancer(s) progress, encouraging patient to complete and maintain compliance through full course of radiotherapy.\\n\\nPer Dr. Reid, continued ultrasound guidance and therapeutic radiology simulation-aided field setting verification per fraction is required for field placement, measurement of tumor depth, progress and acute effect monitoring.\\n
Plan: This patient has been treated today superficial radiation therapy for non-melanoma skin cancer. Written informed consent has been previously obtained from this patient for this treatment. This consent is documented in the patient’s chart. The patient gave verbal consent to continue treatment today.\\n\\n\\nThe patient was treated with a specific radiation dose and setup as prescribed by the provider listed on this visit note. A Radiation Therapist performed administration of radiation under supervision of provider. The treatment parameters and cumulative dose are indicated above.\\n\\n\\nPrior to administering the radiation, the patient underwent a verification therapeutic radiology simulation-aided field setting defining relevant normal and abnormal target anatomy and acquiring data necessary developing optimal radiation treatment process for the patient. This process includes verification of the treatment port(s) and proper treatment positioning. All treatment ports were photographed within electronic medical record. The patient’s customized lead blocking along with gross tumor volume and margin was confirmed. Considering superficial radiotherapy is clinical in setup, this requires physician and radiation therapist to clarify location interest being treated against initial images, pathology and patient anatomy. Care was taken ensuring fields treated were geometrically accurate and properly positioned using therapeutic radiology simulation-aided field setting verification per fraction. This process is also utilized to determine if any prescription or setup changes are necessary. These steps are therefore medically necessary ensuring safe and effective administration of radiation. Ongoing therapeutic radiology simulation-aided field setting verification is ordered throughout course of therapy.\\n\\nThe field placement, per fraction, is separate and distinct from the initial simulation, and is an important task in providing safe administration of superficial radiation therapy. Physician evaluation will be ongoing throughout the course of treatment and is deemed medically necessary in order to ensure the efficacy of treatment and any necessary changes. \\n\\nAccording to provider review of verification therapeutic radiology simulation-aided field setting and imaging, NO change is required \\n\\nPer Dr. Reid, continued therapeutic radiology simulation-aided field setting verification per fraction is required for field placement, progress and acute effect monitoring.\\n\\n\\nNo US due to location

## 2019-09-27 NOTE — PROCEDURE: SUPERFICIAL RADIATION TREATMENT
Render Additional Prescriptions In Note?: No
Daily Fractionated Dose (Optional- Include Units) Rx 2: 268.56cGy
Computed Treatment Time In Min (Will Render The Same As Calculated Treatment Time If Left Blank): 0.43
Assessment: Appropriate reaction
Depth (Optional-Please Include Units) Rx 2: 1.35mm
Field Size (Applicator): 2.0 cm
Dimensions-Y Axis In Cm: 1
Fractions / Week Rx 4: 5
Day Of The Week Treatment Administered: Friday
Detail Level: Detailed
Energy (Optional-Please Include Units): 50kV
Field Size (Applicator): 1.5 cm
Pathology Override (Pathology Will Render As Diagnosis Name If Left Blank): Superficial Basal Cell Carcinoma
Pathology Override (Pathology Will Render As Diagnosis Name If Left Blank): Basal Cell Carcinoma Nodular
Daily Fractionated Dose (Optional- Include Units) Rx 2: cGy
Treatment Time / Fractionation (Optional- Include Units): 0.43min
Total Number Of Fractions Rx 2: 10
Custom Shielding Afterword Text Will Not Be Included With Simple Simulations (X X Y Cm............): port to correlate with the lesion size, including treatment margin. The custom lead shield is adequate to accommodate the appropriate applicator and provide adequate shielding around the treatment site. Additional shielding (as noted below) is used to protect sensitive, normal tissues.
Fractions / Week Rx 2: 3
Dimensions-Y Axis In Cm: 0.5
Dose Per Fractionation In Cgy (Optional): 269.73
Custom Shielding Preamble Text Will Not Be Included With Simple Simulations (.......... X X Y Cm): A lead shield of 0.762 mm thickness is utilized to form a molded, custom shield with a
Intro Statement (Will Not Render If Left Blank): The patient is undergoing superficial radiation therapy for skin cancer and presents for weekly evaluation and management.  Per protocol and as documented on the flow sheet, the patient was questioned as to subjective redness, pruritus, pain, drainage, fatigue, or any other symptoms.  Objectively, the radiation area was evaluated with regards to erythema, atrophy, scale, crusting, erosion, ulceration, edema, purpura, tenderness, warmth, drainage, and any other findings.  The plan was extensively reviewed including the dose, and dosing schedule.  The simulation and clinical setup was also reviewed as was the external and any internal shields and based on this review the appropriateness and sufficiency of treatment was determined.
Energy (Include Units): 70Kv
Total Number Of Fractions Rx 4: 15
Daily Fractionated Dose (Optional- Include Units): 268.75cGy
Include Rx 2 When Rendering Additional Prescriptions: Yes
Dose Per Fractionation In Cgy (Optional): 268.75
Patient Positioning: Supine
Treatment Time / Fractionation (Optional- Include Units) Rx 2: 0.36min
Simple Simulation Afterword Text Will Be Included With Simple Simulations (Indications............): The patient had a complete consultation regarding all applicable modalities for the treatment of their skin cancer and based on a variety of factors including the type of tumor, size, and location, the relevant medical history as well as local tissue factors, the functional status of the individual, the ability to perform necessary postoperative wound instructions and the need for simultaneous treatments as well as overall wound healing status, it was determined that the patient would begin radiation therapy treatment for skin cancer.  A full simulation and treatment device design was performed including the determination and formulation of appropriate simple and complex devices including lead shield of 0.762 mm thickness to form molded customized shielding to specifically correlate with the lesion size including treatment margin.  The custom lead shield is adequate to accommodate the appropriate applicator and provide adequate shielding around the treatment site.  The specific field applicator, shields, and devices both simple and complex as well as the specific patient setup is outlined below.  The patient was given a full consent for superficial radiation to both verbally and in writing and the full determination of patient's eligibility for treatment and selection is outlined on the patient eligibility and treatment selection form.  The specific superficial radiotherapy prescription was determined and was documented on the superficial radiotherapy prescription form.  A treatment calculation was also performed and documented on the treatment calculation form.  Based on the prescription, the patient was scheduled for a series of fractional treatments.
Treatment Time / Fractionation (Optional- Include Units): 0.37min
Treatment Time / Fractionation (Optional- Include Units) Rx 2: min
Simple Simulation Preamble Text Will Be Included With Simple Simulations (.......... Indications): Simple simulation was performed today for the following reasons:
Treatment Time In Min (Optional): 0.37
Cumulative Dose In Cgy (Optional): 809.19
Time Dose Fractionation (Optional- Include Units If Applicable) Rx 2: 47
Shielding Size (Optional- Include Units) Rx 2: 1.5cm x 1.5cm
Cumulative Dose In Cgy (Optional): 806.25
Functional Status: 0 (fully active)
Total Number Of Fractions: 20
Total Dose (Optional-Please Include Units) Rx 2: 2685.6cGy
Time Dose Fractionation (Optional- Include Units If Applicable): 95
Total Dose (Optional-Please Include Units): 5394.6cGy
Field Number: 2
Shielding Size (Optional- Include Units): 1.5cm x1.5cm
Daily Fractionated Dose (Optional- Include Units): 269.75cGy
Treatment Device Design After Initial Simulation Justification (Will Render If Bill For Treatment Devices = Yes): The patient is status post radiation simulation and is evaluated as to the use of additional devices for shielding and placement for radiation therapy.
Total Dose (Optional-Please Include Units): 2687.5cGy

## 2019-09-30 ENCOUNTER — APPOINTMENT (OUTPATIENT)
Age: 69
Setting detail: DERMATOLOGY
End: 2019-10-01

## 2019-09-30 PROBLEM — C44.719 BASAL CELL CARCINOMA OF SKIN OF LEFT LOWER LIMB, INCLUDING HIP: Status: ACTIVE | Noted: 2019-09-30

## 2019-09-30 PROBLEM — C44.1122 BASAL CELL CARCINOMA OF SKIN OF RIGHT LOWER EYELID, INCLUDING CANTHUS: Status: ACTIVE | Noted: 2019-09-30

## 2019-09-30 PROCEDURE — 77280 THER RAD SIMULAJ FIELD SMPL: CPT

## 2019-09-30 PROCEDURE — OTHER FOLLOW UP FOR NEXT VISIT: OTHER

## 2019-09-30 PROCEDURE — OTHER SUPERFICIAL RADIATION TREATMENT: OTHER

## 2019-09-30 PROCEDURE — OTHER TREATMENT REGIMEN: OTHER

## 2019-09-30 PROCEDURE — 77401 RADIATION TX DELIVERY SUPFC: CPT

## 2019-09-30 PROCEDURE — G6001 ECHO GUIDANCE RADIOTHERAPY: HCPCS

## 2019-09-30 NOTE — PROCEDURE: SUPERFICIAL RADIATION TREATMENT
Treatment Time / Fractionation (Optional- Include Units) Rx 2: min
Computed Treatment Time In Min (Will Render The Same As Calculated Treatment Time If Left Blank): 0.37
Dimensions-Y Axis In Cm: 1
Include Rx 2 When Rendering Additional Prescriptions: No
Dose / Tx In Cgy (Optional): 268.75
Energy (Include Units): 50Kv
Simple Simulation Afterword Text Will Be Included With Simple Simulations (Indications............): The patient had a complete consultation regarding all applicable modalities for the treatment of their skin cancer and based on a variety of factors including the type of tumor, size, and location, the relevant medical history as well as local tissue factors, the functional status of the individual, the ability to perform necessary postoperative wound instructions and the need for simultaneous treatments as well as overall wound healing status, it was determined that the patient would begin radiation therapy treatment for skin cancer.  A full simulation and treatment device design was performed including the determination and formulation of appropriate simple and complex devices including lead shield of 0.762 mm thickness to form molded customized shielding to specifically correlate with the lesion size including treatment margin.  The custom lead shield is adequate to accommodate the appropriate applicator and provide adequate shielding around the treatment site.  The specific field applicator, shields, and devices both simple and complex as well as the specific patient setup is outlined below.  The patient was given a full consent for superficial radiation to both verbally and in writing and the full determination of patient's eligibility for treatment and selection is outlined on the patient eligibility and treatment selection form.  The specific superficial radiotherapy prescription was determined and was documented on the superficial radiotherapy prescription form.  A treatment calculation was also performed and documented on the treatment calculation form.  Based on the prescription, the patient was scheduled for a series of fractional treatments.
Total Number Of Fractions Rx 3: 15
Patient Positioning: Supine
Computed Treatment Time In Min (Will Render The Same As Calculated Treatment Time If Left Blank): 0.43
Energy (Optional-Please Include Units): 70kV
Custom Shielding Afterword Text Will Not Be Included With Simple Simulations (X X Y Cm............): port to correlate with the lesion size, including treatment margin. The custom lead shield is adequate to accommodate the appropriate applicator and provide adequate shielding around the treatment site. Additional shielding (as noted below) is used to protect sensitive, normal tissues.
Dose Per Fractionation In Cgy (Optional): 269.73
Field Size (Applicator): 1.5 cm
Field Size (Applicator) Rx 2: 2.0 cm
Total Dose (Optional-Please Include Units): 5394.6cGy
Total Dose (Optional-Please Include Units) Rx 2: 2685.6cGy
Depth (Optional-Please Include Units): 1.35mm
Custom Shielding Preamble Text Will Not Be Included With Simple Simulations (.......... X X Y Cm): A lead shield of 0.762 mm thickness is utilized to form a molded, custom shield with a
Pathology Override (Pathology Will Render As Diagnosis Name If Left Blank): Basal Cell Carcinoma Nodular
Treatment Time / Fractionation (Optional- Include Units) Rx 2: 0.36min
Time Dose Fractionation (Optional- Include Units If Applicable): 95
Fractions / Week Rx 3: 5
Shielding Size (Optional- Include Units) Rx 2: 1.5cm x 1.5cm
Dimensions-X Axis In Cm: 0.5
Pathology Override (Pathology Will Render As Diagnosis Name If Left Blank): Superficial Basal Cell Carcinoma
Daily Fractionated Dose (Optional- Include Units) Rx 2: 268.56cGy
Fractions / Week: 3
Daily Fractionated Dose (Optional- Include Units): 269.75cGy
Detail Level: Detailed
Time Dose Fractionation (Optional- Include Units If Applicable): 47
Functional Status: 0 (fully active)
Day Of The Week Treatment Administered: Monday
Total Number Of Fractions: 20
Cumulative Dose In Cgy (Optional): 1078.92
Daily Fractionated Dose (Optional- Include Units) Rx 2: cGy
Total Dose (Optional-Please Include Units): 2687.5cGy
Total Number Of Fractions Rx 2: 10
Assessment: Appropriate reaction
Treatment Time / Fractionation (Optional- Include Units): 0.43min
Bill For Radiation Treatment: Yes
Simple Simulation Preamble Text Will Be Included With Simple Simulations (.......... Indications): Simple simulation was performed today for the following reasons:
Field Number: 2
Treatment Device Design After Initial Simulation Justification (Will Render If Bill For Treatment Devices = Yes): The patient is status post radiation simulation and is evaluated as to the use of additional devices for shielding and placement for radiation therapy.
Intro Statement (Will Not Render If Left Blank): The patient is undergoing superficial radiation therapy for skin cancer and presents for weekly evaluation and management.  Per protocol and as documented on the flow sheet, the patient was questioned as to subjective redness, pruritus, pain, drainage, fatigue, or any other symptoms.  Objectively, the radiation area was evaluated with regards to erythema, atrophy, scale, crusting, erosion, ulceration, edema, purpura, tenderness, warmth, drainage, and any other findings.  The plan was extensively reviewed including the dose, and dosing schedule.  The simulation and clinical setup was also reviewed as was the external and any internal shields and based on this review the appropriateness and sufficiency of treatment was determined.
Fractionation Number: 4
Cumulative Dose In Cgy (Optional): 1075.0
Treatment Time / Fractionation (Optional- Include Units): 0.37min
Shielding Size (Optional- Include Units): 1.5cm x1.5cm
Daily Fractionated Dose (Optional- Include Units): 268.75cGy

## 2019-09-30 NOTE — PROCEDURE: TREATMENT REGIMEN
Plan: This patient has been treated today with image guided superficial radiation therapy for non-melanoma skin cancer. Written informed consent has been previously obtained from this patient for this treatment. This consent is documented in the patient’s chart. The patient gave verbal consent to continue treatment today.\\n\\n\\nThe patient was treated with a specific radiation dose and setup as prescribed by the provider listed on this visit note. A Radiation Therapist performed administration of radiation under supervision of provider. The treatment parameters and cumulative dose are indicated above.\\n\\n\\nPrior to administering the radiation, the patient underwent a verification therapeutic radiology simulation-aided field setting defining relevant normal and abnormal target anatomy and acquiring images with high frequency ultrasound in addition to data necessary developing optimal radiation treatment process for the patient. This process includes verification of the treatment port(s) and proper treatment positioning. All treatment ports were photographed within electronic medical record. The patient’s customized lead blocking along with gross tumor volume and margin was confirmed. Considering superficial radiotherapy is clinical in setup, this requires physician and radiation therapist to clarify location interest being treated against initial images, pathology and patient anatomy. Care was taken ensuring fields treated were geometrically accurate and properly positioned using therapeutic radiology simulation-aided field setting verification per fraction. This process is also utilized to determine if any prescription or setup changes are necessary. These steps are therefore medically necessary ensuring safe and effective administration of radiation. Ongoing therapeutic radiology simulation-aided field setting verification is ordered throughout course of therapy.\\n\\n\\nA high frequency ultrasound image was acquired today for two-dimensional evaluation of the tumor volume and response to treatment, in addition to geometric accuracy of field placement. \\n\\nUS depth is 1.09mm, which is 0.16mm in difference from previous imaging. Repop +++\\n\\nThe field placement and ultrasound imaging, per fraction, is separate and distinct from the initial simulation, and is an important task in providing safe administration of superficial radiation therapy. Physician evaluation of the ultrasound tumor depth will be ongoing throughout the course of treatment and is deemed medically necessary in order to ensure the efficacy of treatment and any necessary changes. Today’s image was evaluated for determination of continuation of treatment with the current plan or with necessary changes as appropriate. \\n\\n\\nAccording to provider review of verification therapeutic radiology simulation-aided field setting and imaging, NO  change is required. Additionally, the use of ultrasound visualization and targeted assessment allows the patient to be able to see their cancer(s) progress, encouraging patient to complete and maintain compliance through full course of radiotherapy.\\n\\nPer Dr. Reid, continued ultrasound guidance and therapeutic radiology simulation-aided field setting verification per fraction is required for field placement, measurement of tumor depth, progress and acute effect monitoring.\\n
Plan: This patient has been treated today superficial radiation therapy for non-melanoma skin cancer. Written informed consent has been previously obtained from this patient for this treatment. This consent is documented in the patient’s chart. The patient gave verbal consent to continue treatment today.\\n\\n\\nThe patient was treated with a specific radiation dose and setup as prescribed by the provider listed on this visit note. A Radiation Therapist performed administration of radiation under supervision of provider. The treatment parameters and cumulative dose are indicated above.\\n\\n\\nPrior to administering the radiation, the patient underwent a verification therapeutic radiology simulation-aided field setting defining relevant normal and abnormal target anatomy and acquiring data necessary developing optimal radiation treatment process for the patient. This process includes verification of the treatment port(s) and proper treatment positioning. All treatment ports were photographed within electronic medical record. The patient’s customized lead blocking along with gross tumor volume and margin was confirmed. Considering superficial radiotherapy is clinical in setup, this requires physician and radiation therapist to clarify location interest being treated against initial images, pathology and patient anatomy. Care was taken ensuring fields treated were geometrically accurate and properly positioned using therapeutic radiology simulation-aided field setting verification per fraction. This process is also utilized to determine if any prescription or setup changes are necessary. These steps are therefore medically necessary ensuring safe and effective administration of radiation. Ongoing therapeutic radiology simulation-aided field setting verification is ordered throughout course of therapy.\\n\\nThe field placement, per fraction, is separate and distinct from the initial simulation, and is an important task in providing safe administration of superficial radiation therapy. Physician evaluation will be ongoing throughout the course of treatment and is deemed medically necessary in order to ensure the efficacy of treatment and any necessary changes. \\n\\nAccording to provider review of verification therapeutic radiology simulation-aided field setting and imaging, NO change is required \\n\\nPer Dr. Reid, continued therapeutic radiology simulation-aided field setting verification per fraction is required for field placement, progress and acute effect monitoring.\\n\\n\\nNo US due to location
Detail Level: Zone

## 2019-10-02 ENCOUNTER — APPOINTMENT (OUTPATIENT)
Age: 69
Setting detail: DERMATOLOGY
End: 2019-10-02

## 2019-10-02 DIAGNOSIS — L57.0 ACTINIC KERATOSIS: ICD-10-CM

## 2019-10-02 DIAGNOSIS — L82.0 INFLAMED SEBORRHEIC KERATOSIS: ICD-10-CM

## 2019-10-02 PROBLEM — C44.1122 BASAL CELL CARCINOMA OF SKIN OF RIGHT LOWER EYELID, INCLUDING CANTHUS: Status: ACTIVE | Noted: 2019-10-02

## 2019-10-02 PROBLEM — C44.719 BASAL CELL CARCINOMA OF SKIN OF LEFT LOWER LIMB, INCLUDING HIP: Status: ACTIVE | Noted: 2019-10-02

## 2019-10-02 PROCEDURE — OTHER FOLLOW UP FOR NEXT VISIT: OTHER

## 2019-10-02 PROCEDURE — 17110 DESTRUCT B9 LESION 1-14: CPT

## 2019-10-02 PROCEDURE — 77401 RADIATION TX DELIVERY SUPFC: CPT

## 2019-10-02 PROCEDURE — OTHER TREATMENT REGIMEN: OTHER

## 2019-10-02 PROCEDURE — 17000 DESTRUCT PREMALG LESION: CPT | Mod: 59

## 2019-10-02 PROCEDURE — G6001 ECHO GUIDANCE RADIOTHERAPY: HCPCS

## 2019-10-02 PROCEDURE — 99212 OFFICE O/P EST SF 10 MIN: CPT | Mod: 25

## 2019-10-02 PROCEDURE — 77280 THER RAD SIMULAJ FIELD SMPL: CPT

## 2019-10-02 PROCEDURE — OTHER MIPS QUALITY: OTHER

## 2019-10-02 PROCEDURE — OTHER SUPERFICIAL RADIATION TREATMENT: OTHER

## 2019-10-02 PROCEDURE — OTHER LIQUID NITROGEN: OTHER

## 2019-10-02 ASSESSMENT — LOCATION ZONE DERM
LOCATION ZONE: NOSE
LOCATION ZONE: ARM

## 2019-10-02 ASSESSMENT — LOCATION SIMPLE DESCRIPTION DERM
LOCATION SIMPLE: NOSE
LOCATION SIMPLE: RIGHT FOREARM

## 2019-10-02 ASSESSMENT — LOCATION DETAILED DESCRIPTION DERM
LOCATION DETAILED: RIGHT PROXIMAL DORSAL FOREARM
LOCATION DETAILED: NASAL SUPRATIP

## 2019-10-02 NOTE — PROCEDURE: LIQUID NITROGEN
Render Note In Bullet Format When Appropriate: No
Consent: The patient's consent was obtained including but not limited to risks of crusting, scabbing, blistering, scarring, darker or lighter pigmentary change, recurrence, incomplete removal and infection.
Post-Care Instructions: I reviewed with the patient in detail post-care instructions. Patient is to wear sunprotection, and avoid picking at any of the treated lesions. Pt may apply Vaseline to crusted or scabbing areas.
Duration Of Freeze Thaw-Cycle (Seconds): 1
Number Of Freeze-Thaw Cycles: 3 freeze-thaw cycles
Medical Necessity Clause: This procedure was medically necessary because the lesions that were treated were: viral infection
Medical Necessity Information: It is in your best interest to select a reason for this procedure from the list below. All of these items fulfill various CMS LCD requirements except the new and changing color options.
Number Of Freeze-Thaw Cycles: 2 freeze-thaw cycles
Detail Level: Detailed
Detail Level: Simple

## 2019-10-02 NOTE — PROCEDURE: SUPERFICIAL RADIATION TREATMENT
Total Dose (Optional-Please Include Units): 2687.5cGy
Render Patient Eligibility And Selection In Note?: No
Treatment Time In Min (Optional): 0.43
Energy (Optional-Please Include Units): 50kV
Custom Shielding Preamble Text Will Not Be Included With Simple Simulations (.......... X X Y Cm): A lead shield of 0.762 mm thickness is utilized to form a molded, custom shield with a
Custom Shielding Afterword Text Will Not Be Included With Simple Simulations (X X Y Cm............): port to correlate with the lesion size, including treatment margin. The custom lead shield is adequate to accommodate the appropriate applicator and provide adequate shielding around the treatment site. Additional shielding (as noted below) is used to protect sensitive, normal tissues.
Field Size (Applicator): 1.5 cm
Dimensions-X Axis In Cm: 1
Intro Statement (Will Not Render If Left Blank): The patient is undergoing superficial radiation therapy for skin cancer and presents for weekly evaluation and management.  Per protocol and as documented on the flow sheet, the patient was questioned as to subjective redness, pruritus, pain, drainage, fatigue, or any other symptoms.  Objectively, the radiation area was evaluated with regards to erythema, atrophy, scale, crusting, erosion, ulceration, edema, purpura, tenderness, warmth, drainage, and any other findings.  The plan was extensively reviewed including the dose, and dosing schedule.  The simulation and clinical setup was also reviewed as was the external and any internal shields and based on this review the appropriateness and sufficiency of treatment was determined.
Simple Simulation Preamble Text Will Be Included With Simple Simulations (.......... Indications): Simple simulation was performed today for the following reasons:
Field Number: 2
Time Dose Fractionation (Optional- Include Units If Applicable) Rx 2: 47
Bill For Radiation Treatment: Yes
Assessment: Appropriate reaction
Day Of The Week Treatment Administered: Wednesday
Energy (Optional-Please Include Units): 70kV
Daily Fractionated Dose (Optional- Include Units): 268.75cGy
Daily Fractionated Dose (Optional- Include Units) Rx 2: 268.56cGy
Detail Level: Detailed
Fractionation Number (Evaluation): 5
Fractions / Week Rx 2: 3
Total Dose (Optional-Please Include Units) Rx 2: cGy
Shielding Size (Optional- Include Units): 1.5cm x1.5cm
Field Size (Applicator) Rx 2: 2.0 cm
Information: Selecting Yes will display possible errors in your note based on the variables you have selected. This validation is only offered as a suggestion for you. PLEASE NOTE THAT THE VALIDATION TEXT WILL BE REMOVED WHEN YOU FINALIZE YOUR NOTE. IF YOU WANT TO FAX A PRELIMINARY NOTE YOU WILL NEED TO TOGGLE THIS TO 'NO' IF YOU DO NOT WANT IT IN YOUR FAXED NOTE.
Treatment Time / Fractionation (Optional- Include Units): 0.37min
Patient Positioning: Supine
Initial Radiation Treatment Planning (Will Render If Bill Simulation = Yes): The patient had a complete consultation regarding all applicable modalities for the treatment of their skin cancer and based on a variety of factors including the type of tumor, size, and location, the relevant medical history as well as local tissue factors, the functional status of the individual, the ability to perform necessary postoperative wound instructions and the need for simultaneous treatments as well as overall wound healing status, it was determined that the patient would begin radiation therapy treatment for skin cancer.  A full simulation and treatment device design was performed including the determination and formulation of appropriate simple and complex devices including lead shield of 0.762 mm thickness to form molded customized shielding to specifically correlate with the lesion size including treatment margin.  The custom lead shield is adequate to accommodate the appropriate applicator and provide adequate shielding around the treatment site.  The specific field applicator, shields, and devices both simple and complex as well as the specific patient setup is outlined below.  The patient was given a full consent for superficial radiation to both verbally and in writing and the full determination of patient's eligibility for treatment and selection is outlined on the patient eligibility and treatment selection form.  The specific superficial radiotherapy prescription was determined and was documented on the superficial radiotherapy prescription form.  A treatment calculation was also performed and documented on the treatment calculation form.  Based on the prescription, the patient was scheduled for a series of fractional treatments.
Treatment Device Design After Initial Simulation Justification (Will Render If Bill For Treatment Devices = Yes): The patient is status post radiation simulation and is evaluated as to the use of additional devices for shielding and placement for radiation therapy.
Total Number Of Fractions: 20
Dose / Tx In Cgy (Optional): 269.73
Treatment Time / Fractionation (Optional- Include Units) Rx 2: 0.36min
Total Number Of Fractions Rx 2: 10
Total Number Of Fractions Rx 4: 15
Daily Fractionated Dose (Optional- Include Units): 269.75cGy
Pathology Override (Pathology Will Render As Diagnosis Name If Left Blank): Basal Cell Carcinoma Nodular
Functional Status: 0 (fully active)
Total Dose (Optional-Please Include Units): 5394.6cGy
Total Dose (Optional-Please Include Units) Rx 2: 2685.6cGy
Treatment Time / Fractionation (Optional- Include Units) Rx 2: min
Comments: On Target, RTOG 0
Depth (Optional-Please Include Units) Rx 2: 1.35mm
Pathology Override (Pathology Will Render As Diagnosis Name If Left Blank): Superficial Basal Cell Carcinoma
Dose Per Fractionation In Cgy (Optional): 268.75
Dimensions-X Axis In Cm: 0.5
Cumulative Dose In Cgy (Optional): 1348.65
Cumulative Dose In Cgy (Optional): 1343.75
Shielding Size (Optional- Include Units) Rx 2: 1.5cm x 1.5cm
Treatment Time In Min (Optional): 0.37
Treatment Time / Fractionation (Optional- Include Units): 0.43min
Time Dose Fractionation (Optional- Include Units If Applicable): 95
Comments: On Target, RTOG 1

## 2019-10-02 NOTE — PROCEDURE: TREATMENT REGIMEN
Detail Level: Zone
Plan: This patient has been treated today with image guided superficial radiation therapy for non-melanoma skin cancer. Written informed consent has been previously obtained from this patient for this treatment. This consent is documented in the patient’s chart. The patient gave verbal consent to continue treatment today.\\n\\n\\nThe patient was treated with a specific radiation dose and setup as prescribed by the provider listed on this visit note. A Radiation Therapist performed administration of radiation under supervision of provider. The treatment parameters and cumulative dose are indicated above.\\n\\n\\nPrior to administering the radiation, the patient underwent a verification therapeutic radiology simulation-aided field setting defining relevant normal and abnormal target anatomy and acquiring images with high frequency ultrasound in addition to data necessary developing optimal radiation treatment process for the patient. This process includes verification of the treatment port(s) and proper treatment positioning. All treatment ports were photographed within electronic medical record. The patient’s customized lead blocking along with gross tumor volume and margin was confirmed. Considering superficial radiotherapy is clinical in setup, this requires physician and radiation therapist to clarify location interest being treated against initial images, pathology and patient anatomy. Care was taken ensuring fields treated were geometrically accurate and properly positioned using therapeutic radiology simulation-aided field setting verification per fraction. This process is also utilized to determine if any prescription or setup changes are necessary. These steps are therefore medically necessary ensuring safe and effective administration of radiation. Ongoing therapeutic radiology simulation-aided field setting verification is ordered throughout course of therapy.\\n\\n\\nA high frequency ultrasound image was acquired today for two-dimensional evaluation of the tumor volume and response to treatment, in addition to geometric accuracy of field placement. \\n\\nUS depth is 0.79mm, which is 0.3mm in difference from previous imaging. Repop ++\\n\\nThe field placement and ultrasound imaging, per fraction, is separate and distinct from the initial simulation, and is an important task in providing safe administration of superficial radiation therapy. Physician evaluation of the ultrasound tumor depth will be ongoing throughout the course of treatment and is deemed medically necessary in order to ensure the efficacy of treatment and any necessary changes. Today’s image was evaluated for determination of continuation of treatment with the current plan or with necessary changes as appropriate. \\n\\n\\nAccording to provider review of verification therapeutic radiology simulation-aided field setting and imaging, NO  change is required. Additionally, the use of ultrasound visualization and targeted assessment allows the patient to be able to see their cancer(s) progress, encouraging patient to complete and maintain compliance through full course of radiotherapy.\\n\\nPer Dr. Reid, continued ultrasound guidance and therapeutic radiology simulation-aided field setting verification per fraction is required for field placement, measurement of tumor depth, progress and acute effect monitoring.\\n
Plan: This patient has been treated today superficial radiation therapy for non-melanoma skin cancer. Written informed consent has been previously obtained from this patient for this treatment. This consent is documented in the patient’s chart. The patient gave verbal consent to continue treatment today.\\n\\n\\nThe patient was treated with a specific radiation dose and setup as prescribed by the provider listed on this visit note. A Radiation Therapist performed administration of radiation under supervision of provider. The treatment parameters and cumulative dose are indicated above.\\n\\n\\nPrior to administering the radiation, the patient underwent a verification therapeutic radiology simulation-aided field setting defining relevant normal and abnormal target anatomy and acquiring data necessary developing optimal radiation treatment process for the patient. This process includes verification of the treatment port(s) and proper treatment positioning. All treatment ports were photographed within electronic medical record. The patient’s customized lead blocking along with gross tumor volume and margin was confirmed. Considering superficial radiotherapy is clinical in setup, this requires physician and radiation therapist to clarify location interest being treated against initial images, pathology and patient anatomy. Care was taken ensuring fields treated were geometrically accurate and properly positioned using therapeutic radiology simulation-aided field setting verification per fraction. This process is also utilized to determine if any prescription or setup changes are necessary. These steps are therefore medically necessary ensuring safe and effective administration of radiation. Ongoing therapeutic radiology simulation-aided field setting verification is ordered throughout course of therapy.\\n\\nThe field placement, per fraction, is separate and distinct from the initial simulation, and is an important task in providing safe administration of superficial radiation therapy. Physician evaluation will be ongoing throughout the course of treatment and is deemed medically necessary in order to ensure the efficacy of treatment and any necessary changes. \\n\\nAccording to provider review of verification therapeutic radiology simulation-aided field setting and imaging, NO change is required \\n\\nPer Dr. Reid, continued therapeutic radiology simulation-aided field setting verification per fraction is required for field placement, progress and acute effect monitoring.\\n\\n\\nNo US due to location

## 2019-10-04 ENCOUNTER — APPOINTMENT (OUTPATIENT)
Age: 69
Setting detail: DERMATOLOGY
End: 2019-10-08

## 2019-10-04 PROBLEM — C44.1122 BASAL CELL CARCINOMA OF SKIN OF RIGHT LOWER EYELID, INCLUDING CANTHUS: Status: ACTIVE | Noted: 2019-10-04

## 2019-10-04 PROBLEM — C44.719 BASAL CELL CARCINOMA OF SKIN OF LEFT LOWER LIMB, INCLUDING HIP: Status: ACTIVE | Noted: 2019-10-04

## 2019-10-04 PROCEDURE — 77280 THER RAD SIMULAJ FIELD SMPL: CPT

## 2019-10-04 PROCEDURE — OTHER SUPERFICIAL RADIATION TREATMENT: OTHER

## 2019-10-04 PROCEDURE — G6001 ECHO GUIDANCE RADIOTHERAPY: HCPCS

## 2019-10-04 PROCEDURE — OTHER FOLLOW UP FOR NEXT VISIT: OTHER

## 2019-10-04 PROCEDURE — OTHER TREATMENT REGIMEN: OTHER

## 2019-10-04 PROCEDURE — 77401 RADIATION TX DELIVERY SUPFC: CPT

## 2019-10-04 NOTE — PROCEDURE: SUPERFICIAL RADIATION TREATMENT
Simple Simulation Afterword Text Will Be Included With Simple Simulations (Indications............): The patient had a complete consultation regarding all applicable modalities for the treatment of their skin cancer and based on a variety of factors including the type of tumor, size, and location, the relevant medical history as well as local tissue factors, the functional status of the individual, the ability to perform necessary postoperative wound instructions and the need for simultaneous treatments as well as overall wound healing status, it was determined that the patient would begin radiation therapy treatment for skin cancer.  A full simulation and treatment device design was performed including the determination and formulation of appropriate simple and complex devices including lead shield of 0.762 mm thickness to form molded customized shielding to specifically correlate with the lesion size including treatment margin.  The custom lead shield is adequate to accommodate the appropriate applicator and provide adequate shielding around the treatment site.  The specific field applicator, shields, and devices both simple and complex as well as the specific patient setup is outlined below.  The patient was given a full consent for superficial radiation to both verbally and in writing and the full determination of patient's eligibility for treatment and selection is outlined on the patient eligibility and treatment selection form.  The specific superficial radiotherapy prescription was determined and was documented on the superficial radiotherapy prescription form.  A treatment calculation was also performed and documented on the treatment calculation form.  Based on the prescription, the patient was scheduled for a series of fractional treatments.
Render Text From Evaluation And Management Tab (Will Not Bill [id number]): No
Prescription Used: 1
Computed Treatment Time In Min (Will Render The Same As Calculated Treatment Time If Left Blank): 0.37
Bill For Radiation Treatment: Yes
Treatment Time / Fractionation (Optional- Include Units) Rx 2: 0.36min
Daily Fractionated Dose (Optional- Include Units) Rx 2: 268.56cGy
Fractions / Week: 3
Information: Selecting Yes will display possible errors in your note based on the variables you have selected. This validation is only offered as a suggestion for you. PLEASE NOTE THAT THE VALIDATION TEXT WILL BE REMOVED WHEN YOU FINALIZE YOUR NOTE. IF YOU WANT TO FAX A PRELIMINARY NOTE YOU WILL NEED TO TOGGLE THIS TO 'NO' IF YOU DO NOT WANT IT IN YOUR FAXED NOTE.
Total Dose (Optional-Please Include Units): 2687.5cGy
Treatment Margins In Cm: 0.5
Energy (Include Units): 70Kv
Time Dose Fractionation (Optional- Include Units If Applicable): 47
Patient Positioning: Supine
Comments: On Target, RTOG 1
Field Size (Applicator): 1.5 cm
Total Dose (Optional-Please Include Units): 5394.6cGy
Energy (Optional-Please Include Units) Rx 2: 50Kv
Total Number Of Fractions Rx 4: 15
Fractionation Number: 6
Custom Shielding Afterword Text Will Not Be Included With Simple Simulations (X X Y Cm............): port to correlate with the lesion size, including treatment margin. The custom lead shield is adequate to accommodate the appropriate applicator and provide adequate shielding around the treatment site. Additional shielding (as noted below) is used to protect sensitive, normal tissues.
Cumulative Dose In Cgy (Optional): 1612.5
Custom Shielding Preamble Text Will Not Be Included With Simple Simulations (.......... X X Y Cm): A lead shield of 0.762 mm thickness is utilized to form a molded, custom shield with a
Dose / Tx In Cgy (Optional): 269.73
Daily Fractionated Dose (Optional- Include Units) Rx 2: cGy
Intro Statement (Will Not Render If Left Blank): The patient is undergoing superficial radiation therapy for skin cancer and presents for weekly evaluation and management.  Per protocol and as documented on the flow sheet, the patient was questioned as to subjective redness, pruritus, pain, drainage, fatigue, or any other symptoms.  Objectively, the radiation area was evaluated with regards to erythema, atrophy, scale, crusting, erosion, ulceration, edema, purpura, tenderness, warmth, drainage, and any other findings.  The plan was extensively reviewed including the dose, and dosing schedule.  The simulation and clinical setup was also reviewed as was the external and any internal shields and based on this review the appropriateness and sufficiency of treatment was determined.
Depth (Optional-Please Include Units) Rx 2: 1.35mm
Fractions / Week Rx 4: 5
Shielding Size (Optional- Include Units): 1.5cm x1.5cm
Day Of The Week Treatment Administered: Friday
Total Number Of Fractions: 10
Treatment Device Design After Initial Simulation Justification (Will Render If Bill For Treatment Devices = Yes): The patient is status post radiation simulation and is evaluated as to the use of additional devices for shielding and placement for radiation therapy.
Field Size (Applicator) Rx 2: 2.0 cm
Pathology Override (Pathology Will Render As Diagnosis Name If Left Blank): Basal Cell Carcinoma Nodular
Shielding Size (Optional- Include Units) Rx 2: 1.5cm x 1.5cm
Daily Fractionated Dose (Optional- Include Units): 268.75cGy
Total Number Of Fractions: 20
Detail Level: Detailed
Treatment Time / Fractionation (Optional- Include Units): 0.43min
Time Dose Fractionation (Optional- Include Units If Applicable): 95
Treatment Time In Min (Optional): 0.43
Dose Per Fractionation In Cgy (Optional): 268.75
Functional Status: 0 (fully active)
Treatment Time / Fractionation (Optional- Include Units): 0.37min
Assessment: Appropriate reaction
Simple Simulation Preamble Text Will Be Included With Simple Simulations (.......... Indications): Simple simulation was performed today for the following reasons:
Field Number: 2
Cumulative Dose In Cgy (Optional): 1618.38
Treatment Time / Fractionation (Optional- Include Units) Rx 2: min
Daily Fractionated Dose (Optional- Include Units): 269.75cGy
Comments: On Target, RTOG 0
Pathology Override (Pathology Will Render As Diagnosis Name If Left Blank): Superficial Basal Cell Carcinoma
Total Dose (Optional-Please Include Units) Rx 2: 2685.6cGy

## 2019-10-04 NOTE — PROCEDURE: TREATMENT REGIMEN
Plan: This patient has been treated today with image guided superficial radiation therapy for non-melanoma skin cancer. Written informed consent has been previously obtained from this patient for this treatment. This consent is documented in the patient’s chart. The patient gave verbal consent to continue treatment today.\\n\\n\\nThe patient was treated with a specific radiation dose and setup as prescribed by the provider listed on this visit note. A Radiation Therapist performed administration of radiation under supervision of provider. The treatment parameters and cumulative dose are indicated above.\\n\\n\\nPrior to administering the radiation, the patient underwent a verification therapeutic radiology simulation-aided field setting defining relevant normal and abnormal target anatomy and acquiring images with high frequency ultrasound in addition to data necessary developing optimal radiation treatment process for the patient. This process includes verification of the treatment port(s) and proper treatment positioning. All treatment ports were photographed within electronic medical record. The patient’s customized lead blocking along with gross tumor volume and margin was confirmed. Considering superficial radiotherapy is clinical in setup, this requires physician and radiation therapist to clarify location interest being treated against initial images, pathology and patient anatomy. Care was taken ensuring fields treated were geometrically accurate and properly positioned using therapeutic radiology simulation-aided field setting verification per fraction. This process is also utilized to determine if any prescription or setup changes are necessary. These steps are therefore medically necessary ensuring safe and effective administration of radiation. Ongoing therapeutic radiology simulation-aided field setting verification is ordered throughout course of therapy.\\n\\n\\nA high frequency ultrasound image was acquired today for two-dimensional evaluation of the tumor volume and response to treatment, in addition to geometric accuracy of field placement. \\n\\nUS depth is 0.93mm, which is 0.14mm in difference from previous imaging. Repop ++\\n\\nThe field placement and ultrasound imaging, per fraction, is separate and distinct from the initial simulation, and is an important task in providing safe administration of superficial radiation therapy. Physician evaluation of the ultrasound tumor depth will be ongoing throughout the course of treatment and is deemed medically necessary in order to ensure the efficacy of treatment and any necessary changes. Today’s image was evaluated for determination of continuation of treatment with the current plan or with necessary changes as appropriate. \\n\\n\\nAccording to provider review of verification therapeutic radiology simulation-aided field setting and imaging, NO  change is required. Additionally, the use of ultrasound visualization and targeted assessment allows the patient to be able to see their cancer(s) progress, encouraging patient to complete and maintain compliance through full course of radiotherapy.\\n\\nPer Dr. Reid, continued ultrasound guidance and therapeutic radiology simulation-aided field setting verification per fraction is required for field placement, measurement of tumor depth, progress and acute effect monitoring.\\n
Detail Level: Zone
Plan: This patient has been treated today superficial radiation therapy for non-melanoma skin cancer. Written informed consent has been previously obtained from this patient for this treatment. This consent is documented in the patient’s chart. The patient gave verbal consent to continue treatment today.\\n\\n\\nThe patient was treated with a specific radiation dose and setup as prescribed by the provider listed on this visit note. A Radiation Therapist performed administration of radiation under supervision of provider. The treatment parameters and cumulative dose are indicated above.\\n\\n\\nPrior to administering the radiation, the patient underwent a verification therapeutic radiology simulation-aided field setting defining relevant normal and abnormal target anatomy and acquiring data necessary developing optimal radiation treatment process for the patient. This process includes verification of the treatment port(s) and proper treatment positioning. All treatment ports were photographed within electronic medical record. The patient’s customized lead blocking along with gross tumor volume and margin was confirmed. Considering superficial radiotherapy is clinical in setup, this requires physician and radiation therapist to clarify location interest being treated against initial images, pathology and patient anatomy. Care was taken ensuring fields treated were geometrically accurate and properly positioned using therapeutic radiology simulation-aided field setting verification per fraction. This process is also utilized to determine if any prescription or setup changes are necessary. These steps are therefore medically necessary ensuring safe and effective administration of radiation. Ongoing therapeutic radiology simulation-aided field setting verification is ordered throughout course of therapy.\\n\\nThe field placement, per fraction, is separate and distinct from the initial simulation, and is an important task in providing safe administration of superficial radiation therapy. Physician evaluation will be ongoing throughout the course of treatment and is deemed medically necessary in order to ensure the efficacy of treatment and any necessary changes. \\n\\nAccording to provider review of verification therapeutic radiology simulation-aided field setting and imaging, NO change is required \\n\\nPer Dr. Reid, continued therapeutic radiology simulation-aided field setting verification per fraction is required for field placement, progress and acute effect monitoring.\\n\\n\\nNo US due to location

## 2019-10-07 ENCOUNTER — APPOINTMENT (OUTPATIENT)
Age: 69
Setting detail: DERMATOLOGY
End: 2019-10-08

## 2019-10-07 PROBLEM — C44.719 BASAL CELL CARCINOMA OF SKIN OF LEFT LOWER LIMB, INCLUDING HIP: Status: ACTIVE | Noted: 2019-10-07

## 2019-10-07 PROBLEM — C44.1122 BASAL CELL CARCINOMA OF SKIN OF RIGHT LOWER EYELID, INCLUDING CANTHUS: Status: ACTIVE | Noted: 2019-10-07

## 2019-10-07 PROCEDURE — 77280 THER RAD SIMULAJ FIELD SMPL: CPT

## 2019-10-07 PROCEDURE — OTHER SUPERFICIAL RADIATION TREATMENT: OTHER

## 2019-10-07 PROCEDURE — G6001 ECHO GUIDANCE RADIOTHERAPY: HCPCS

## 2019-10-07 PROCEDURE — OTHER TREATMENT REGIMEN: OTHER

## 2019-10-07 PROCEDURE — 77401 RADIATION TX DELIVERY SUPFC: CPT

## 2019-10-07 PROCEDURE — OTHER FOLLOW UP FOR NEXT VISIT: OTHER

## 2019-10-07 NOTE — PROCEDURE: TREATMENT REGIMEN
yesterday
Plan: This patient has been treated today superficial radiation therapy for non-melanoma skin cancer. Written informed consent has been previously obtained from this patient for this treatment. This consent is documented in the patient’s chart. The patient gave verbal consent to continue treatment today.\\n\\n\\nThe patient was treated with a specific radiation dose and setup as prescribed by the provider listed on this visit note. A Radiation Therapist performed administration of radiation under supervision of provider. The treatment parameters and cumulative dose are indicated above.\\n\\n\\nPrior to administering the radiation, the patient underwent a verification therapeutic radiology simulation-aided field setting defining relevant normal and abnormal target anatomy and acquiring data necessary developing optimal radiation treatment process for the patient. This process includes verification of the treatment port(s) and proper treatment positioning. All treatment ports were photographed within electronic medical record. The patient’s customized lead blocking along with gross tumor volume and margin was confirmed. Considering superficial radiotherapy is clinical in setup, this requires physician and radiation therapist to clarify location interest being treated against initial images, pathology and patient anatomy. Care was taken ensuring fields treated were geometrically accurate and properly positioned using therapeutic radiology simulation-aided field setting verification per fraction. This process is also utilized to determine if any prescription or setup changes are necessary. These steps are therefore medically necessary ensuring safe and effective administration of radiation. Ongoing therapeutic radiology simulation-aided field setting verification is ordered throughout course of therapy.\\n\\nThe field placement, per fraction, is separate and distinct from the initial simulation, and is an important task in providing safe administration of superficial radiation therapy. Physician evaluation will be ongoing throughout the course of treatment and is deemed medically necessary in order to ensure the efficacy of treatment and any necessary changes. \\n\\nAccording to provider review of verification therapeutic radiology simulation-aided field setting and imaging, NO change is required \\n\\nPer Dr. Reid, continued therapeutic radiology simulation-aided field setting verification per fraction is required for field placement, progress and acute effect monitoring.\\n\\n\\nNo US due to location
Plan: This patient has been treated today with image guided superficial radiation therapy for non-melanoma skin cancer. Written informed consent has been previously obtained from this patient for this treatment. This consent is documented in the patient’s chart. The patient gave verbal consent to continue treatment today.\\n\\n\\nThe patient was treated with a specific radiation dose and setup as prescribed by the provider listed on this visit note. A Radiation Therapist performed administration of radiation under supervision of provider. The treatment parameters and cumulative dose are indicated above.\\n\\n\\nPrior to administering the radiation, the patient underwent a verification therapeutic radiology simulation-aided field setting defining relevant normal and abnormal target anatomy and acquiring images with high frequency ultrasound in addition to data necessary developing optimal radiation treatment process for the patient. This process includes verification of the treatment port(s) and proper treatment positioning. All treatment ports were photographed within electronic medical record. The patient’s customized lead blocking along with gross tumor volume and margin was confirmed. Considering superficial radiotherapy is clinical in setup, this requires physician and radiation therapist to clarify location interest being treated against initial images, pathology and patient anatomy. Care was taken ensuring fields treated were geometrically accurate and properly positioned using therapeutic radiology simulation-aided field setting verification per fraction. This process is also utilized to determine if any prescription or setup changes are necessary. These steps are therefore medically necessary ensuring safe and effective administration of radiation. Ongoing therapeutic radiology simulation-aided field setting verification is ordered throughout course of therapy.\\n\\n\\nA high frequency ultrasound image was acquired today for two-dimensional evaluation of the tumor volume and response to treatment, in addition to geometric accuracy of field placement. \\n\\nUS depth is 0.92mm, which is 0.01mm in difference from previous imaging. Repop ++\\n\\nThe field placement and ultrasound imaging, per fraction, is separate and distinct from the initial simulation, and is an important task in providing safe administration of superficial radiation therapy. Physician evaluation of the ultrasound tumor depth will be ongoing throughout the course of treatment and is deemed medically necessary in order to ensure the efficacy of treatment and any necessary changes. Today’s image was evaluated for determination of continuation of treatment with the current plan or with necessary changes as appropriate. \\n\\n\\nAccording to provider review of verification therapeutic radiology simulation-aided field setting and imaging, NO  change is required. Additionally, the use of ultrasound visualization and targeted assessment allows the patient to be able to see their cancer(s) progress, encouraging patient to complete and maintain compliance through full course of radiotherapy.\\n\\nPer Dr. Reid, continued ultrasound guidance and therapeutic radiology simulation-aided field setting verification per fraction is required for field placement, measurement of tumor depth, progress and acute effect monitoring.\\n
Detail Level: Zone

## 2019-10-07 NOTE — PROCEDURE: SUPERFICIAL RADIATION TREATMENT
Energy (Optional-Please Include Units): 50kV
Fractionation Number (Evaluation): 5
Include Rx 2 When Rendering Additional Prescriptions: Yes
Render Text From Evaluation And Management Tab (Will Not Bill 16119): No
Dose Per Fractionation In Cgy (Optional): 268.75
Number Of Treatment Days: 1
Dose Per Fractionation In Cgy (Optional): 269.73
Functional Status: 0 (fully active)
Energy (Optional-Please Include Units): 70kV
Shielding Size (Optional- Include Units): 1.5cm x1.5cm
Treatment Time In Min (Optional): 0.37
Field Size (Applicator): 1.5 cm
Simple Simulation Afterword Text Will Be Included With Simple Simulations (Indications............): The patient had a complete consultation regarding all applicable modalities for the treatment of their skin cancer and based on a variety of factors including the type of tumor, size, and location, the relevant medical history as well as local tissue factors, the functional status of the individual, the ability to perform necessary postoperative wound instructions and the need for simultaneous treatments as well as overall wound healing status, it was determined that the patient would begin radiation therapy treatment for skin cancer.  A full simulation and treatment device design was performed including the determination and formulation of appropriate simple and complex devices including lead shield of 0.762 mm thickness to form molded customized shielding to specifically correlate with the lesion size including treatment margin.  The custom lead shield is adequate to accommodate the appropriate applicator and provide adequate shielding around the treatment site.  The specific field applicator, shields, and devices both simple and complex as well as the specific patient setup is outlined below.  The patient was given a full consent for superficial radiation to both verbally and in writing and the full determination of patient's eligibility for treatment and selection is outlined on the patient eligibility and treatment selection form.  The specific superficial radiotherapy prescription was determined and was documented on the superficial radiotherapy prescription form.  A treatment calculation was also performed and documented on the treatment calculation form.  Based on the prescription, the patient was scheduled for a series of fractional treatments.
Total Dose (Optional-Please Include Units): 5394.6cGy
Treatment Margins In Cm: 0.5
Time Dose Fractionation (Optional- Include Units If Applicable): 47
Fractions / Week: 3
Fractionation Number: 7
Daily Fractionated Dose (Optional- Include Units): 268.75cGy
Comments: On Target, RTOG 1
Total Number Of Fractions Rx 3: 15
Treatment Device Design After Initial Simulation Justification (Will Render If Bill For Treatment Devices = Yes): The patient is status post radiation simulation and is evaluated as to the use of additional devices for shielding and placement for radiation therapy.
Total Number Of Fractions: 10
Detail Level: Detailed
Information: Selecting Yes will display possible errors in your note based on the variables you have selected. This validation is only offered as a suggestion for you. PLEASE NOTE THAT THE VALIDATION TEXT WILL BE REMOVED WHEN YOU FINALIZE YOUR NOTE. IF YOU WANT TO FAX A PRELIMINARY NOTE YOU WILL NEED TO TOGGLE THIS TO 'NO' IF YOU DO NOT WANT IT IN YOUR FAXED NOTE.
Patient Positioning: Supine
Field Size (Applicator): 2.0 cm
Treatment Time In Min (Optional): 0.43
Day Of The Week Treatment Administered: Monday
Comments: On Target, RTOG 0
Time Dose Fractionation (Optional- Include Units If Applicable): 95
Treatment Time / Fractionation (Optional- Include Units): 0.37min
Custom Shielding Afterword Text Will Not Be Included With Simple Simulations (X X Y Cm............): port to correlate with the lesion size, including treatment margin. The custom lead shield is adequate to accommodate the appropriate applicator and provide adequate shielding around the treatment site. Additional shielding (as noted below) is used to protect sensitive, normal tissues.
Field Number: 2
Simple Simulation Preamble Text Will Be Included With Simple Simulations (.......... Indications): Simple simulation was performed today for the following reasons:
Total Dose (Optional-Please Include Units) Rx 2: 2685.6cGy
Total Dose (Optional-Please Include Units): 2687.5cGy
Custom Shielding Preamble Text Will Not Be Included With Simple Simulations (.......... X X Y Cm): A lead shield of 0.762 mm thickness is utilized to form a molded, custom shield with a
Assessment: Appropriate reaction
Treatment Time / Fractionation (Optional- Include Units): 0.43min
Total Number Of Fractions: 20
Pathology Override (Pathology Will Render As Diagnosis Name If Left Blank): Superficial Basal Cell Carcinoma
Cumulative Dose In Cgy (Optional): 1881.25
Daily Fractionated Dose (Optional- Include Units) Rx 2: 268.56cGy
Shielding Size (Optional- Include Units) Rx 2: 1.5cm x 1.5cm
Intro Statement (Will Not Render If Left Blank): The patient is undergoing superficial radiation therapy for skin cancer and presents for weekly evaluation and management.  Per protocol and as documented on the flow sheet, the patient was questioned as to subjective redness, pruritus, pain, drainage, fatigue, or any other symptoms.  Objectively, the radiation area was evaluated with regards to erythema, atrophy, scale, crusting, erosion, ulceration, edema, purpura, tenderness, warmth, drainage, and any other findings.  The plan was extensively reviewed including the dose, and dosing schedule.  The simulation and clinical setup was also reviewed as was the external and any internal shields and based on this review the appropriateness and sufficiency of treatment was determined.
Treatment Time / Fractionation (Optional- Include Units) Rx 2: min
Total Dose (Optional-Please Include Units) Rx 2: cGy
Daily Fractionated Dose (Optional- Include Units): 269.75cGy
Pathology Override (Pathology Will Render As Diagnosis Name If Left Blank): Basal Cell Carcinoma Nodular
Depth (Optional-Please Include Units) Rx 2: 1.35mm
Cumulative Dose In Cgy (Optional): 1888.11
Treatment Time / Fractionation (Optional- Include Units) Rx 2: 0.36min

## 2019-10-09 ENCOUNTER — APPOINTMENT (OUTPATIENT)
Age: 69
Setting detail: DERMATOLOGY
End: 2019-10-10

## 2019-10-09 PROBLEM — C44.719 BASAL CELL CARCINOMA OF SKIN OF LEFT LOWER LIMB, INCLUDING HIP: Status: ACTIVE | Noted: 2019-10-09

## 2019-10-09 PROBLEM — C44.1122 BASAL CELL CARCINOMA OF SKIN OF RIGHT LOWER EYELID, INCLUDING CANTHUS: Status: ACTIVE | Noted: 2019-10-09

## 2019-10-09 PROCEDURE — 77280 THER RAD SIMULAJ FIELD SMPL: CPT

## 2019-10-09 PROCEDURE — OTHER SUPERFICIAL RADIATION TREATMENT: OTHER

## 2019-10-09 PROCEDURE — 77401 RADIATION TX DELIVERY SUPFC: CPT

## 2019-10-09 PROCEDURE — OTHER TREATMENT REGIMEN: OTHER

## 2019-10-09 PROCEDURE — OTHER FOLLOW UP FOR NEXT VISIT: OTHER

## 2019-10-09 PROCEDURE — G6001 ECHO GUIDANCE RADIOTHERAPY: HCPCS

## 2019-10-09 NOTE — PROCEDURE: SUPERFICIAL RADIATION TREATMENT
Cumulative Dose In Cgy (Optional): 2157.84
Simple Simulation Preamble Text Will Be Included With Simple Simulations (.......... Indications): Simple simulation was performed today for the following reasons:
Bill And Render Text From Evaluation And Management Tab (Will Bill [id number]): No
Dose Per Fractionation In Cgy (Optional): 268.75
Energy (Optional-Please Include Units): 70kV
Cumulative Dose In Cgy (Optional): 2150.0
Custom Shielding Afterword Text Will Not Be Included With Simple Simulations (X X Y Cm............): port to correlate with the lesion size, including treatment margin. The custom lead shield is adequate to accommodate the appropriate applicator and provide adequate shielding around the treatment site. Additional shielding (as noted below) is used to protect sensitive, normal tissues.
Comments: On Target, RTOG 0
Fractions / Week Rx 4: 5
Fractions / Week: 3
Time Dose Fractionation (Optional- Include Units If Applicable): 95
Treatment Time In Min (Optional): 0.37
Field Size (Applicator): 1.5 cm
Comments: On Target, RTOG 1
Total Number Of Fractions Rx 2: 10
Total Dose (Optional-Please Include Units) Rx 2: 2685.6cGy
Include Rx 2 When Rendering Additional Prescriptions: Yes
Computed Treatment Time In Min (Will Render The Same As Calculated Treatment Time If Left Blank): 0.43
Energy (Optional-Please Include Units) Rx 2: 50Kv
Treatment Device Design After Initial Simulation Justification (Will Render If Bill For Treatment Devices = Yes): The patient is status post radiation simulation and is evaluated as to the use of additional devices for shielding and placement for radiation therapy.
Field Number: 1
Simple Simulation Afterword Text Will Be Included With Simple Simulations (Indications............): The patient had a complete consultation regarding all applicable modalities for the treatment of their skin cancer and based on a variety of factors including the type of tumor, size, and location, the relevant medical history as well as local tissue factors, the functional status of the individual, the ability to perform necessary postoperative wound instructions and the need for simultaneous treatments as well as overall wound healing status, it was determined that the patient would begin radiation therapy treatment for skin cancer.  A full simulation and treatment device design was performed including the determination and formulation of appropriate simple and complex devices including lead shield of 0.762 mm thickness to form molded customized shielding to specifically correlate with the lesion size including treatment margin.  The custom lead shield is adequate to accommodate the appropriate applicator and provide adequate shielding around the treatment site.  The specific field applicator, shields, and devices both simple and complex as well as the specific patient setup is outlined below.  The patient was given a full consent for superficial radiation to both verbally and in writing and the full determination of patient's eligibility for treatment and selection is outlined on the patient eligibility and treatment selection form.  The specific superficial radiotherapy prescription was determined and was documented on the superficial radiotherapy prescription form.  A treatment calculation was also performed and documented on the treatment calculation form.  Based on the prescription, the patient was scheduled for a series of fractional treatments.
Shielding Size (Optional- Include Units): 1.5cm x1.5cm
Treatment Time / Fractionation (Optional- Include Units) Rx 2: 0.36min
Functional Status: 0 (fully active)
Shielding Size (Optional- Include Units) Rx 2: 1.5cm x 1.5cm
Total Number Of Fractions Rx 4: 15
Intro Statement (Will Not Render If Left Blank): The patient is undergoing superficial radiation therapy for skin cancer and presents for weekly evaluation and management.  Per protocol and as documented on the flow sheet, the patient was questioned as to subjective redness, pruritus, pain, drainage, fatigue, or any other symptoms.  Objectively, the radiation area was evaluated with regards to erythema, atrophy, scale, crusting, erosion, ulceration, edema, purpura, tenderness, warmth, drainage, and any other findings.  The plan was extensively reviewed including the dose, and dosing schedule.  The simulation and clinical setup was also reviewed as was the external and any internal shields and based on this review the appropriateness and sufficiency of treatment was determined.
Depth (Optional-Please Include Units) Rx 2: 1.35mm
Patient Positioning: Supine
Assessment: Appropriate reaction
Fractionation Number: 8
Time Dose Fractionation (Optional- Include Units If Applicable) Rx 2: 47
Daily Fractionated Dose (Optional- Include Units) Rx 2: cGy
Dose / Tx In Cgy (Optional): 269.73
Day Of The Week Treatment Administered: Wednesday
Dimensions-Y Axis In Cm: 0.5
Information: Selecting Yes will display possible errors in your note based on the variables you have selected. This validation is only offered as a suggestion for you. PLEASE NOTE THAT THE VALIDATION TEXT WILL BE REMOVED WHEN YOU FINALIZE YOUR NOTE. IF YOU WANT TO FAX A PRELIMINARY NOTE YOU WILL NEED TO TOGGLE THIS TO 'NO' IF YOU DO NOT WANT IT IN YOUR FAXED NOTE.
Total Dose (Optional-Please Include Units): 5394.6cGy
Daily Fractionated Dose (Optional- Include Units) Rx 2: 268.56cGy
Field Size (Applicator) Rx 2: 2.0 cm
Detail Level: Detailed
Daily Fractionated Dose (Optional- Include Units): 268.75cGy
Total Dose (Optional-Please Include Units): 2687.5cGy
Pathology Override (Pathology Will Render As Diagnosis Name If Left Blank): Basal Cell Carcinoma Nodular
Custom Shielding Preamble Text Will Not Be Included With Simple Simulations (.......... X X Y Cm): A lead shield of 0.762 mm thickness is utilized to form a molded, custom shield with a
Treatment Time / Fractionation (Optional- Include Units) Rx 2: min
Daily Fractionated Dose (Optional- Include Units): 269.75cGy
Treatment Time / Fractionation (Optional- Include Units): 0.43min
Treatment Time / Fractionation (Optional- Include Units): 0.37min
Total Number Of Fractions: 20
Field Number: 2
Pathology Override (Pathology Will Render As Diagnosis Name If Left Blank): Superficial Basal Cell Carcinoma

## 2019-10-09 NOTE — PROCEDURE: TREATMENT REGIMEN
Detail Level: Zone
Plan: This patient has been treated today with image guided superficial radiation therapy for non-melanoma skin cancer. Written informed consent has been previously obtained from this patient for this treatment. This consent is documented in the patient’s chart. The patient gave verbal consent to continue treatment today.\\n\\n\\nThe patient was treated with a specific radiation dose and setup as prescribed by the provider listed on this visit note. A Radiation Therapist performed administration of radiation under supervision of provider. The treatment parameters and cumulative dose are indicated above.\\n\\n\\nPrior to administering the radiation, the patient underwent a verification therapeutic radiology simulation-aided field setting defining relevant normal and abnormal target anatomy and acquiring images with high frequency ultrasound in addition to data necessary developing optimal radiation treatment process for the patient. This process includes verification of the treatment port(s) and proper treatment positioning. All treatment ports were photographed within electronic medical record. The patient’s customized lead blocking along with gross tumor volume and margin was confirmed. Considering superficial radiotherapy is clinical in setup, this requires physician and radiation therapist to clarify location interest being treated against initial images, pathology and patient anatomy. Care was taken ensuring fields treated were geometrically accurate and properly positioned using therapeutic radiology simulation-aided field setting verification per fraction. This process is also utilized to determine if any prescription or setup changes are necessary. These steps are therefore medically necessary ensuring safe and effective administration of radiation. Ongoing therapeutic radiology simulation-aided field setting verification is ordered throughout course of therapy.\\n\\n\\nA high frequency ultrasound image was acquired today for two-dimensional evaluation of the tumor volume and response to treatment, in addition to geometric accuracy of field placement. \\n\\nUS depth is 1.16mm, which is 0.24mm in difference from previous imaging. Repop +\\n\\nThe field placement and ultrasound imaging, per fraction, is separate and distinct from the initial simulation, and is an important task in providing safe administration of superficial radiation therapy. Physician evaluation of the ultrasound tumor depth will be ongoing throughout the course of treatment and is deemed medically necessary in order to ensure the efficacy of treatment and any necessary changes. Today’s image was evaluated for determination of continuation of treatment with the current plan or with necessary changes as appropriate. \\n\\n\\nAccording to provider review of verification therapeutic radiology simulation-aided field setting and imaging, NO  change is required. Additionally, the use of ultrasound visualization and targeted assessment allows the patient to be able to see their cancer(s) progress, encouraging patient to complete and maintain compliance through full course of radiotherapy.\\n\\nPer Dr. Reid, continued ultrasound guidance and therapeutic radiology simulation-aided field setting verification per fraction is required for field placement, measurement of tumor depth, progress and acute effect monitoring.\\n
Plan: This patient has been treated today superficial radiation therapy for non-melanoma skin cancer. Written informed consent has been previously obtained from this patient for this treatment. This consent is documented in the patient’s chart. The patient gave verbal consent to continue treatment today.\\n\\n\\nThe patient was treated with a specific radiation dose and setup as prescribed by the provider listed on this visit note. A Radiation Therapist performed administration of radiation under supervision of provider. The treatment parameters and cumulative dose are indicated above.\\n\\n\\nPrior to administering the radiation, the patient underwent a verification therapeutic radiology simulation-aided field setting defining relevant normal and abnormal target anatomy and acquiring data necessary developing optimal radiation treatment process for the patient. This process includes verification of the treatment port(s) and proper treatment positioning. All treatment ports were photographed within electronic medical record. The patient’s customized lead blocking along with gross tumor volume and margin was confirmed. Considering superficial radiotherapy is clinical in setup, this requires physician and radiation therapist to clarify location interest being treated against initial images, pathology and patient anatomy. Care was taken ensuring fields treated were geometrically accurate and properly positioned using therapeutic radiology simulation-aided field setting verification per fraction. This process is also utilized to determine if any prescription or setup changes are necessary. These steps are therefore medically necessary ensuring safe and effective administration of radiation. Ongoing therapeutic radiology simulation-aided field setting verification is ordered throughout course of therapy.\\n\\nThe field placement, per fraction, is separate and distinct from the initial simulation, and is an important task in providing safe administration of superficial radiation therapy. Physician evaluation will be ongoing throughout the course of treatment and is deemed medically necessary in order to ensure the efficacy of treatment and any necessary changes. \\n\\nAccording to provider review of verification therapeutic radiology simulation-aided field setting and imaging, NO change is required \\n\\nPer Dr. Reid, continued therapeutic radiology simulation-aided field setting verification per fraction is required for field placement, progress and acute effect monitoring.\\n\\n\\nNo US due to location

## 2019-10-11 ENCOUNTER — APPOINTMENT (OUTPATIENT)
Age: 69
Setting detail: DERMATOLOGY
End: 2019-10-11

## 2019-10-11 PROBLEM — C44.719 BASAL CELL CARCINOMA OF SKIN OF LEFT LOWER LIMB, INCLUDING HIP: Status: ACTIVE | Noted: 2019-10-11

## 2019-10-11 PROBLEM — C44.1122 BASAL CELL CARCINOMA OF SKIN OF RIGHT LOWER EYELID, INCLUDING CANTHUS: Status: ACTIVE | Noted: 2019-10-11

## 2019-10-11 PROCEDURE — OTHER FOLLOW UP FOR NEXT VISIT: OTHER

## 2019-10-11 PROCEDURE — 77401 RADIATION TX DELIVERY SUPFC: CPT

## 2019-10-11 PROCEDURE — 77280 THER RAD SIMULAJ FIELD SMPL: CPT

## 2019-10-11 PROCEDURE — OTHER TREATMENT REGIMEN: OTHER

## 2019-10-11 PROCEDURE — G6001 ECHO GUIDANCE RADIOTHERAPY: HCPCS

## 2019-10-11 PROCEDURE — OTHER SUPERFICIAL RADIATION TREATMENT: OTHER

## 2019-10-11 NOTE — PROCEDURE: TREATMENT REGIMEN
Detail Level: Zone
Plan: This patient has been treated today superficial radiation therapy for non-melanoma skin cancer. Written informed consent has been previously obtained from this patient for this treatment. This consent is documented in the patient’s chart. The patient gave verbal consent to continue treatment today.\\n\\n\\nThe patient was treated with a specific radiation dose and setup as prescribed by the provider listed on this visit note. A Radiation Therapist performed administration of radiation under supervision of provider. The treatment parameters and cumulative dose are indicated above.\\n\\n\\nPrior to administering the radiation, the patient underwent a verification therapeutic radiology simulation-aided field setting defining relevant normal and abnormal target anatomy and acquiring data necessary developing optimal radiation treatment process for the patient. This process includes verification of the treatment port(s) and proper treatment positioning. All treatment ports were photographed within electronic medical record. The patient’s customized lead blocking along with gross tumor volume and margin was confirmed. Considering superficial radiotherapy is clinical in setup, this requires physician and radiation therapist to clarify location interest being treated against initial images, pathology and patient anatomy. Care was taken ensuring fields treated were geometrically accurate and properly positioned using therapeutic radiology simulation-aided field setting verification per fraction. This process is also utilized to determine if any prescription or setup changes are necessary. These steps are therefore medically necessary ensuring safe and effective administration of radiation. Ongoing therapeutic radiology simulation-aided field setting verification is ordered throughout course of therapy.\\n\\nThe field placement, per fraction, is separate and distinct from the initial simulation, and is an important task in providing safe administration of superficial radiation therapy. Physician evaluation will be ongoing throughout the course of treatment and is deemed medically necessary in order to ensure the efficacy of treatment and any necessary changes. \\n\\nAccording to provider review of verification therapeutic radiology simulation-aided field setting and imaging, NO change is required \\n\\nPer Dr. Reid, continued therapeutic radiology simulation-aided field setting verification per fraction is required for field placement, progress and acute effect monitoring.\\n\\n\\nNo US due to location
Plan: This patient has been treated today with image guided superficial radiation therapy for non-melanoma skin cancer. Written informed consent has been previously obtained from this patient for this treatment. This consent is documented in the patient’s chart. The patient gave verbal consent to continue treatment today.\\n\\n\\nThe patient was treated with a specific radiation dose and setup as prescribed by the provider listed on this visit note. A Radiation Therapist performed administration of radiation under supervision of provider. The treatment parameters and cumulative dose are indicated above.\\n\\n\\nPrior to administering the radiation, the patient underwent a verification therapeutic radiology simulation-aided field setting defining relevant normal and abnormal target anatomy and acquiring images with high frequency ultrasound in addition to data necessary developing optimal radiation treatment process for the patient. This process includes verification of the treatment port(s) and proper treatment positioning. All treatment ports were photographed within electronic medical record. The patient’s customized lead blocking along with gross tumor volume and margin was confirmed. Considering superficial radiotherapy is clinical in setup, this requires physician and radiation therapist to clarify location interest being treated against initial images, pathology and patient anatomy. Care was taken ensuring fields treated were geometrically accurate and properly positioned using therapeutic radiology simulation-aided field setting verification per fraction. This process is also utilized to determine if any prescription or setup changes are necessary. These steps are therefore medically necessary ensuring safe and effective administration of radiation. Ongoing therapeutic radiology simulation-aided field setting verification is ordered throughout course of therapy.\\n\\n\\nA high frequency ultrasound image was acquired today for two-dimensional evaluation of the tumor volume and response to treatment, in addition to geometric accuracy of field placement. \\n\\nUS depth is 1.19mm, which is 0.03mm in difference from previous imaging. Repop +\\n\\nThe field placement and ultrasound imaging, per fraction, is separate and distinct from the initial simulation, and is an important task in providing safe administration of superficial radiation therapy. Physician evaluation of the ultrasound tumor depth will be ongoing throughout the course of treatment and is deemed medically necessary in order to ensure the efficacy of treatment and any necessary changes. Today’s image was evaluated for determination of continuation of treatment with the current plan or with necessary changes as appropriate. \\n\\n\\nAccording to provider review of verification therapeutic radiology simulation-aided field setting and imaging, NO  change is required. Additionally, the use of ultrasound visualization and targeted assessment allows the patient to be able to see their cancer(s) progress, encouraging patient to complete and maintain compliance through full course of radiotherapy.\\n\\nPer Dr. Reid, continued ultrasound guidance and therapeutic radiology simulation-aided field setting verification per fraction is required for field placement, measurement of tumor depth, progress and acute effect monitoring.\\n

## 2019-10-11 NOTE — PROCEDURE: SUPERFICIAL RADIATION TREATMENT
Total Dose (Optional-Please Include Units): 5394.6cGy
Vinh For Simulation Without Treatment Device Design (Simple Simulation): No
Cumulative Dose In Cgy (Optional): 2418.75
Fractions / Week Rx 3: 5
Custom Shielding Afterword Text Will Not Be Included With Simple Simulations (X X Y Cm............): port to correlate with the lesion size, including treatment margin. The custom lead shield is adequate to accommodate the appropriate applicator and provide adequate shielding around the treatment site. Additional shielding (as noted below) is used to protect sensitive, normal tissues.
Dimensions-X Axis In Cm: 0.5
Simple Simulation Preamble Text Will Be Included With Simple Simulations (.......... Indications): Simple simulation was performed today for the following reasons:
Field Size (Applicator): 1.5 cm
Fractions / Week: 3
Total Number Of Fractions Rx 3: 15
Information: Selecting Yes will display possible errors in your note based on the variables you have selected. This validation is only offered as a suggestion for you. PLEASE NOTE THAT THE VALIDATION TEXT WILL BE REMOVED WHEN YOU FINALIZE YOUR NOTE. IF YOU WANT TO FAX A PRELIMINARY NOTE YOU WILL NEED TO TOGGLE THIS TO 'NO' IF YOU DO NOT WANT IT IN YOUR FAXED NOTE.
Detail Level: Detailed
Initial Radiation Treatment Planning (Will Render If Bill Simulation = Yes): The patient had a complete consultation regarding all applicable modalities for the treatment of their skin cancer and based on a variety of factors including the type of tumor, size, and location, the relevant medical history as well as local tissue factors, the functional status of the individual, the ability to perform necessary postoperative wound instructions and the need for simultaneous treatments as well as overall wound healing status, it was determined that the patient would begin radiation therapy treatment for skin cancer.  A full simulation and treatment device design was performed including the determination and formulation of appropriate simple and complex devices including lead shield of 0.762 mm thickness to form molded customized shielding to specifically correlate with the lesion size including treatment margin.  The custom lead shield is adequate to accommodate the appropriate applicator and provide adequate shielding around the treatment site.  The specific field applicator, shields, and devices both simple and complex as well as the specific patient setup is outlined below.  The patient was given a full consent for superficial radiation to both verbally and in writing and the full determination of patient's eligibility for treatment and selection is outlined on the patient eligibility and treatment selection form.  The specific superficial radiotherapy prescription was determined and was documented on the superficial radiotherapy prescription form.  A treatment calculation was also performed and documented on the treatment calculation form.  Based on the prescription, the patient was scheduled for a series of fractional treatments.
Depth (Optional-Please Include Units) Rx 2: 1.35mm
Dose Per Fractionation In Cgy (Optional): 268.75
Dimensions-Y Axis In Cm: 1
Custom Shielding Preamble Text Will Not Be Included With Simple Simulations (.......... X X Y Cm): A lead shield of 0.762 mm thickness is utilized to form a molded, custom shield with a
Energy (Optional-Please Include Units): 50kV
Daily Fractionated Dose (Optional- Include Units): 269.75cGy
Daily Fractionated Dose (Optional- Include Units) Rx 2: 268.56cGy
Energy (Optional-Please Include Units): 70kV
Treatment Time / Fractionation (Optional- Include Units): 0.37min
Shielding Size (Optional- Include Units) Rx 2: 1.5cm x 1.5cm
Include Rx 2 When Rendering Additional Prescriptions: Yes
Time Dose Fractionation (Optional- Include Units If Applicable): 47
Intro Statement (Will Not Render If Left Blank): The patient is undergoing superficial radiation therapy for skin cancer and presents for weekly evaluation and management.  Per protocol and as documented on the flow sheet, the patient was questioned as to subjective redness, pruritus, pain, drainage, fatigue, or any other symptoms.  Objectively, the radiation area was evaluated with regards to erythema, atrophy, scale, crusting, erosion, ulceration, edema, purpura, tenderness, warmth, drainage, and any other findings.  The plan was extensively reviewed including the dose, and dosing schedule.  The simulation and clinical setup was also reviewed as was the external and any internal shields and based on this review the appropriateness and sufficiency of treatment was determined.
Cumulative Dose In Cgy (Optional): 2427.57
Assessment: Appropriate reaction
Dose / Tx In Cgy (Optional): 269.73
Daily Fractionated Dose (Optional- Include Units): 268.75cGy
Total Dose (Optional-Please Include Units) Rx 2: 2685.6cGy
Patient Positioning: Supine
Treatment Device Design After Initial Simulation Justification (Will Render If Bill For Treatment Devices = Yes): The patient is status post radiation simulation and is evaluated as to the use of additional devices for shielding and placement for radiation therapy.
Day Of The Week Treatment Administered: Friday
Daily Fractionated Dose (Optional- Include Units) Rx 2: cGy
Total Number Of Fractions Rx 2: 10
Treatment Time In Min (Optional): 0.37
Functional Status: 0 (fully active)
Field Size (Applicator): 2.0 cm
Field Number: 2
Fractionation Number: 9
Pathology Override (Pathology Will Render As Diagnosis Name If Left Blank): Superficial Basal Cell Carcinoma
Pathology Override (Pathology Will Render As Diagnosis Name If Left Blank): Basal Cell Carcinoma Nodular
Treatment Time / Fractionation (Optional- Include Units) Rx 2: 0.36min
Comments: On Target, RTOG 1
Shielding Size (Optional- Include Units): 1.5cm x1.5cm
Treatment Time / Fractionation (Optional- Include Units) Rx 2: min
Total Dose (Optional-Please Include Units): 2687.5cGy
Comments: On Target, RTOG 0
Computed Treatment Time In Min (Will Render The Same As Calculated Treatment Time If Left Blank): 0.43
Time Dose Fractionation (Optional- Include Units If Applicable): 95
Treatment Time / Fractionation (Optional- Include Units): 0.43min
Total Number Of Fractions: 20

## 2019-10-14 ENCOUNTER — APPOINTMENT (OUTPATIENT)
Age: 69
Setting detail: DERMATOLOGY
End: 2019-10-15

## 2019-10-14 PROBLEM — C44.719 BASAL CELL CARCINOMA OF SKIN OF LEFT LOWER LIMB, INCLUDING HIP: Status: ACTIVE | Noted: 2019-10-14

## 2019-10-14 PROBLEM — C44.1122 BASAL CELL CARCINOMA OF SKIN OF RIGHT LOWER EYELID, INCLUDING CANTHUS: Status: ACTIVE | Noted: 2019-10-14

## 2019-10-14 PROCEDURE — OTHER FOLLOW UP FOR NEXT VISIT: OTHER

## 2019-10-14 PROCEDURE — OTHER TREATMENT REGIMEN: OTHER

## 2019-10-14 PROCEDURE — OTHER SUPERFICIAL RADIATION TREATMENT: OTHER

## 2019-10-14 PROCEDURE — G6001 ECHO GUIDANCE RADIOTHERAPY: HCPCS

## 2019-10-14 PROCEDURE — 77280 THER RAD SIMULAJ FIELD SMPL: CPT

## 2019-10-14 PROCEDURE — 77401 RADIATION TX DELIVERY SUPFC: CPT

## 2019-10-14 NOTE — PROCEDURE: TREATMENT REGIMEN
Plan: This patient has been treated today with image guided superficial radiation therapy for non-melanoma skin cancer. Written informed consent has been previously obtained from this patient for this treatment. This consent is documented in the patient’s chart. The patient gave verbal consent to continue treatment today.\\n\\n\\nThe patient was treated with a specific radiation dose and setup as prescribed by the provider listed on this visit note. A Radiation Therapist performed administration of radiation under supervision of provider. The treatment parameters and cumulative dose are indicated above.\\n\\n\\nPrior to administering the radiation, the patient underwent a verification therapeutic radiology simulation-aided field setting defining relevant normal and abnormal target anatomy and acquiring images with high frequency ultrasound in addition to data necessary developing optimal radiation treatment process for the patient. This process includes verification of the treatment port(s) and proper treatment positioning. All treatment ports were photographed within electronic medical record. The patient’s customized lead blocking along with gross tumor volume and margin was confirmed. Considering superficial radiotherapy is clinical in setup, this requires physician and radiation therapist to clarify location interest being treated against initial images, pathology and patient anatomy. Care was taken ensuring fields treated were geometrically accurate and properly positioned using therapeutic radiology simulation-aided field setting verification per fraction. This process is also utilized to determine if any prescription or setup changes are necessary. These steps are therefore medically necessary ensuring safe and effective administration of radiation. Ongoing therapeutic radiology simulation-aided field setting verification is ordered throughout course of therapy.\\n\\nNo US due to location \\n\\nThe field placement and ultrasound imaging, per fraction, is separate and distinct from the initial simulation, and is an important task in providing safe administration of superficial radiation therapy. Physician evaluation of the ultrasound tumor depth will be ongoing throughout the course of treatment and is deemed medically necessary in order to ensure the efficacy of treatment and any necessary changes. Today’s image was evaluated for determination of continuation of treatment with the current plan or with necessary changes as appropriate. \\n\\n\\nAccording to provider review of verification therapeutic radiology simulation-aided field setting and imaging, NO change is required. Additionally, the use of ultrasound visualization and targeted assessment allows the patient to be able to see their cancer(s) progress, encouraging patient to complete and maintain compliance through full course of radiotherapy.\\n\\nPer Dr. Reid, continued ultrasound guidance and therapeutic radiology simulation-aided field setting verification per fraction is required for field placement, measurement of tumor depth, progress and acute effect monitoring. \\n\\n
Detail Level: Zone
Plan: This patient has been treated today with image guided superficial radiation therapy for non-melanoma skin cancer. Written informed consent has been previously obtained from this patient for this treatment. This consent is documented in the patient’s chart. The patient gave verbal consent to continue treatment today.\\n\\n\\nThe patient was treated with a specific radiation dose and setup as prescribed by the provider listed on this visit note. A Radiation Therapist performed administration of radiation under supervision of provider. The treatment parameters and cumulative dose are indicated above.\\n\\n\\nPrior to administering the radiation, the patient underwent a verification therapeutic radiology simulation-aided field setting defining relevant normal and abnormal target anatomy and acquiring images with high frequency ultrasound in addition to data necessary developing optimal radiation treatment process for the patient. This process includes verification of the treatment port(s) and proper treatment positioning. All treatment ports were photographed within electronic medical record. The patient’s customized lead blocking along with gross tumor volume and margin was confirmed. Considering superficial radiotherapy is clinical in setup, this requires physician and radiation therapist to clarify location interest being treated against initial images, pathology and patient anatomy. Care was taken ensuring fields treated were geometrically accurate and properly positioned using therapeutic radiology simulation-aided field setting verification per fraction. This process is also utilized to determine if any prescription or setup changes are necessary. These steps are therefore medically necessary ensuring safe and effective administration of radiation. Ongoing therapeutic radiology simulation-aided field setting verification is ordered throughout course of therapy.\\n\\n\\nA high frequency ultrasound image was acquired today for two-dimensional evaluation of the tumor volume and response to treatment, in addition to geometric accuracy of field placement. \\n\\nUS depth is 1.03mm, which is 0.16mm in difference from previous imaging. Repop +\\n\\nThe field placement and ultrasound imaging, per fraction, is separate and distinct from the initial simulation, and is an important task in providing safe administration of superficial radiation therapy. Physician evaluation of the ultrasound tumor depth will be ongoing throughout the course of treatment and is deemed medically necessary in order to ensure the efficacy of treatment and any necessary changes. Today’s image was evaluated for determination of continuation of treatment with the current plan or with necessary changes as appropriate. \\n\\n\\nAccording to provider review of verification therapeutic radiology simulation-aided field setting and imaging, NO  change is required. Additionally, the use of ultrasound visualization and targeted assessment allows the patient to be able to see their cancer(s) progress, encouraging patient to complete and maintain compliance through full course of radiotherapy.\\n\\nPer Dr. Reid, continued ultrasound guidance and therapeutic radiology simulation-aided field setting verification per fraction is required for field placement, measurement of tumor depth, progress and acute effect monitoring.\\n

## 2019-10-14 NOTE — PROCEDURE: SUPERFICIAL RADIATION TREATMENT
Treatment Time In Min (Optional): 0.37
Field Number: 2
Dose / Tx In Cgy (Optional): 269.73
Bill For Dosimetry/Render Treatment Time Calculation In Note: No
Fractions / Week Rx 2: 3
Total Number Of Fractions Rx 3: 15
Cumulative Dose In Cgy (Optional): 2687.5
Treatment Time / Fractionation (Optional- Include Units): 0.43min
Shielding Size (Optional- Include Units): 1.5cm x1.5cm
Field Size (Applicator): 1.5 cm
Dimensions-X Axis In Cm: 0.5
Time Dose Fractionation (Optional- Include Units If Applicable): 47
Treatment Time / Fractionation (Optional- Include Units): 0.37min
Dimensions-X Axis In Cm: 1
Comments: On Target, RTOG 1
Assessment: Appropriate reaction
Total Number Of Fractions Rx 2: 10
Energy (Optional-Please Include Units): 70kV
Energy (Include Units): 50Kv
Treatment Time / Fractionation (Optional- Include Units) Rx 2: 0.36min
Depth (Optional-Please Include Units): 1.35mm
Information: Selecting Yes will display possible errors in your note based on the variables you have selected. This validation is only offered as a suggestion for you. PLEASE NOTE THAT THE VALIDATION TEXT WILL BE REMOVED WHEN YOU FINALIZE YOUR NOTE. IF YOU WANT TO FAX A PRELIMINARY NOTE YOU WILL NEED TO TOGGLE THIS TO 'NO' IF YOU DO NOT WANT IT IN YOUR FAXED NOTE.
Intro Statement (Will Not Render If Left Blank): The patient is undergoing superficial radiation therapy for skin cancer and presents for weekly evaluation and management.  Per protocol and as documented on the flow sheet, the patient was questioned as to subjective redness, pruritus, pain, drainage, fatigue, or any other symptoms.  Objectively, the radiation area was evaluated with regards to erythema, atrophy, scale, crusting, erosion, ulceration, edema, purpura, tenderness, warmth, drainage, and any other findings.  The plan was extensively reviewed including the dose, and dosing schedule.  The simulation and clinical setup was also reviewed as was the external and any internal shields and based on this review the appropriateness and sufficiency of treatment was determined.
Treatment Device Design After Initial Simulation Justification (Will Render If Bill For Treatment Devices = Yes): The patient is status post radiation simulation and is evaluated as to the use of additional devices for shielding and placement for radiation therapy.
Dose / Tx In Cgy (Optional): 268.75
Day Of The Week Treatment Administered: Monday
Simple Simulation Preamble Text Will Be Included With Simple Simulations (.......... Indications): Simple simulation was performed today for the following reasons:
Time Dose Fractionation (Optional- Include Units If Applicable): 95
Functional Status: 0 (fully active)
Pathology Override (Pathology Will Render As Diagnosis Name If Left Blank): Superficial Basal Cell Carcinoma
Include Rx 2 When Rendering Additional Prescriptions: Yes
Daily Fractionated Dose (Optional- Include Units) Rx 2: 268.56cGy
Initial Radiation Treatment Planning (Will Render If Bill Simulation = Yes): The patient had a complete consultation regarding all applicable modalities for the treatment of their skin cancer and based on a variety of factors including the type of tumor, size, and location, the relevant medical history as well as local tissue factors, the functional status of the individual, the ability to perform necessary postoperative wound instructions and the need for simultaneous treatments as well as overall wound healing status, it was determined that the patient would begin radiation therapy treatment for skin cancer.  A full simulation and treatment device design was performed including the determination and formulation of appropriate simple and complex devices including lead shield of 0.762 mm thickness to form molded customized shielding to specifically correlate with the lesion size including treatment margin.  The custom lead shield is adequate to accommodate the appropriate applicator and provide adequate shielding around the treatment site.  The specific field applicator, shields, and devices both simple and complex as well as the specific patient setup is outlined below.  The patient was given a full consent for superficial radiation to both verbally and in writing and the full determination of patient's eligibility for treatment and selection is outlined on the patient eligibility and treatment selection form.  The specific superficial radiotherapy prescription was determined and was documented on the superficial radiotherapy prescription form.  A treatment calculation was also performed and documented on the treatment calculation form.  Based on the prescription, the patient was scheduled for a series of fractional treatments.
Daily Fractionated Dose (Optional- Include Units): 268.75cGy
Total Dose (Optional-Please Include Units): 2687.5cGy
Fractions / Week Rx 4: 5
Custom Shielding Afterword Text Will Not Be Included With Simple Simulations (X X Y Cm............): port to correlate with the lesion size, including treatment margin. The custom lead shield is adequate to accommodate the appropriate applicator and provide adequate shielding around the treatment site. Additional shielding (as noted below) is used to protect sensitive, normal tissues.
Patient Positioning: Supine
Field Size (Applicator) Rx 2: 2.0 cm
Shielding Size (Optional- Include Units) Rx 2: 1.5cm x 1.5cm
Total Dose (Optional-Please Include Units): 5394.6cGy
Pathology Override (Pathology Will Render As Diagnosis Name If Left Blank): Basal Cell Carcinoma Nodular
Total Number Of Fractions: 20
Cumulative Dose In Cgy (Optional): 2697.3
Total Dose (Optional-Please Include Units) Rx 2: cGy
Computed Treatment Time In Min (Will Render The Same As Calculated Treatment Time If Left Blank): 0.43
Custom Shielding Preamble Text Will Not Be Included With Simple Simulations (.......... X X Y Cm): A lead shield of 0.762 mm thickness is utilized to form a molded, custom shield with a
Treatment Time / Fractionation (Optional- Include Units) Rx 2: min
Total Dose (Optional-Please Include Units) Rx 2: 2685.6cGy
Detail Level: Detailed
Daily Fractionated Dose (Optional- Include Units): 269.75cGy

## 2019-10-16 ENCOUNTER — APPOINTMENT (OUTPATIENT)
Age: 69
Setting detail: DERMATOLOGY
End: 2019-10-16

## 2019-10-16 PROCEDURE — OTHER FOLLOW UP FOR NEXT VISIT: OTHER

## 2019-10-16 PROCEDURE — OTHER SUPERFICIAL RADIATION TREATMENT: OTHER

## 2019-10-16 PROCEDURE — OTHER TREATMENT REGIMEN: OTHER

## 2019-10-16 NOTE — PROCEDURE: TREATMENT REGIMEN
Detail Level: Zone
Plan: This patient has been treated today with image guided superficial radiation therapy for non-melanoma skin cancer. Written informed consent has been previously obtained from this patient for this treatment. This consent is documented in the patient’s chart. The patient gave verbal consent to continue treatment today.\\n\\n\\nThe patient was treated with a specific radiation dose and setup as prescribed by the provider listed on this visit note. A Radiation Therapist performed administration of radiation under supervision of provider. The treatment parameters and cumulative dose are indicated above.\\n\\n\\nPrior to administering the radiation, the patient underwent a verification therapeutic radiology simulation-aided field setting defining relevant normal and abnormal target anatomy and acquiring images with high frequency ultrasound in addition to data necessary developing optimal radiation treatment process for the patient. This process includes verification of the treatment port(s) and proper treatment positioning. All treatment ports were photographed within electronic medical record. The patient’s customized lead blocking along with gross tumor volume and margin was confirmed. Considering superficial radiotherapy is clinical in setup, this requires physician and radiation therapist to clarify location interest being treated against initial images, pathology and patient anatomy. Care was taken ensuring fields treated were geometrically accurate and properly positioned using therapeutic radiology simulation-aided field setting verification per fraction. This process is also utilized to determine if any prescription or setup changes are necessary. These steps are therefore medically necessary ensuring safe and effective administration of radiation. Ongoing therapeutic radiology simulation-aided field setting verification is ordered throughout course of therapy.\\n\\n\\nA high frequency ultrasound image was acquired today for two-dimensional evaluation of the tumor volume and response to treatment, in addition to geometric accuracy of field placement. \\n\\nUS depth is 1.03mm, which is 0.16mm in difference from previous imaging. Repop +\\n\\nThe field placement and ultrasound imaging, per fraction, is separate and distinct from the initial simulation, and is an important task in providing safe administration of superficial radiation therapy. Physician evaluation of the ultrasound tumor depth will be ongoing throughout the course of treatment and is deemed medically necessary in order to ensure the efficacy of treatment and any necessary changes. Today’s image was evaluated for determination of continuation of treatment with the current plan or with necessary changes as appropriate. \\n\\n\\nAccording to provider review of verification therapeutic radiology simulation-aided field setting and imaging, NO  change is required. Additionally, the use of ultrasound visualization and targeted assessment allows the patient to be able to see their cancer(s) progress, encouraging patient to complete and maintain compliance through full course of radiotherapy.\\n\\nPer Dr. Reid, continued ultrasound guidance and therapeutic radiology simulation-aided field setting verification per fraction is required for field placement, measurement of tumor depth, progress and acute effect monitoring.\\n
Plan: This patient has been treated today with image guided superficial radiation therapy for non-melanoma skin cancer. Written informed consent has been previously obtained from this patient for this treatment. This consent is documented in the patient’s chart. The patient gave verbal consent to continue treatment today.\\n\\n\\nThe patient was treated with a specific radiation dose and setup as prescribed by the provider listed on this visit note. A Radiation Therapist performed administration of radiation under supervision of provider. The treatment parameters and cumulative dose are indicated above.\\n\\n\\nPrior to administering the radiation, the patient underwent a verification therapeutic radiology simulation-aided field setting defining relevant normal and abnormal target anatomy and acquiring images with high frequency ultrasound in addition to data necessary developing optimal radiation treatment process for the patient. This process includes verification of the treatment port(s) and proper treatment positioning. All treatment ports were photographed within electronic medical record. The patient’s customized lead blocking along with gross tumor volume and margin was confirmed. Considering superficial radiotherapy is clinical in setup, this requires physician and radiation therapist to clarify location interest being treated against initial images, pathology and patient anatomy. Care was taken ensuring fields treated were geometrically accurate and properly positioned using therapeutic radiology simulation-aided field setting verification per fraction. This process is also utilized to determine if any prescription or setup changes are necessary. These steps are therefore medically necessary ensuring safe and effective administration of radiation. Ongoing therapeutic radiology simulation-aided field setting verification is ordered throughout course of therapy.\\n\\nNo US due to location \\n\\nThe field placement and ultrasound imaging, per fraction, is separate and distinct from the initial simulation, and is an important task in providing safe administration of superficial radiation therapy. Physician evaluation of the ultrasound tumor depth will be ongoing throughout the course of treatment and is deemed medically necessary in order to ensure the efficacy of treatment and any necessary changes. Today’s image was evaluated for determination of continuation of treatment with the current plan or with necessary changes as appropriate. \\n\\n\\nAccording to provider review of verification therapeutic radiology simulation-aided field setting and imaging, NO change is required. Additionally, the use of ultrasound visualization and targeted assessment allows the patient to be able to see their cancer(s) progress, encouraging patient to complete and maintain compliance through full course of radiotherapy.\\n\\nPer Dr. Reid, continued ultrasound guidance and therapeutic radiology simulation-aided field setting verification per fraction is required for field placement, measurement of tumor depth, progress and acute effect monitoring. \\n\\n

## 2019-10-16 NOTE — PROCEDURE: SUPERFICIAL RADIATION TREATMENT
Energy (Optional-Please Include Units) Rx 2: 50Kv
Fractions / Week Rx 3: 5
Total Dose (Optional-Please Include Units): 2687.5cGy
Dose / Tx In Cgy (Optional): 269.73
Render Text From Evaluation And Management Tab (Will Not Bill [id number]): No
Total Number Of Fractions Rx 3: 15
Simple Simulation Afterword Text Will Be Included With Simple Simulations (Indications............): The patient had a complete consultation regarding all applicable modalities for the treatment of their skin cancer and based on a variety of factors including the type of tumor, size, and location, the relevant medical history as well as local tissue factors, the functional status of the individual, the ability to perform necessary postoperative wound instructions and the need for simultaneous treatments as well as overall wound healing status, it was determined that the patient would begin radiation therapy treatment for skin cancer.  A full simulation and treatment device design was performed including the determination and formulation of appropriate simple and complex devices including lead shield of 0.762 mm thickness to form molded customized shielding to specifically correlate with the lesion size including treatment margin.  The custom lead shield is adequate to accommodate the appropriate applicator and provide adequate shielding around the treatment site.  The specific field applicator, shields, and devices both simple and complex as well as the specific patient setup is outlined below.  The patient was given a full consent for superficial radiation to both verbally and in writing and the full determination of patient's eligibility for treatment and selection is outlined on the patient eligibility and treatment selection form.  The specific superficial radiotherapy prescription was determined and was documented on the superficial radiotherapy prescription form.  A treatment calculation was also performed and documented on the treatment calculation form.  Based on the prescription, the patient was scheduled for a series of fractional treatments.
Depth (Optional-Please Include Units) Rx 2: 1.35mm
Treatment Time / Fractionation (Optional- Include Units) Rx 2: min
Include Rx 2 When Rendering Additional Prescriptions: Yes
Functional Status: 0 (fully active)
Dimensions-X Axis In Cm: 1
Treatment Device Design After Initial Simulation Justification (Will Render If Bill For Treatment Devices = Yes): The patient is status post radiation simulation and is evaluated as to the use of additional devices for shielding and placement for radiation therapy.
Treatment Time In Min (Optional): 0.43
Treatment Margins In Cm: 0.5
Total Number Of Fractions Rx 2: 10
Field Size (Applicator): 1.5 cm
Information: Selecting Yes will display possible errors in your note based on the variables you have selected. This validation is only offered as a suggestion for you. PLEASE NOTE THAT THE VALIDATION TEXT WILL BE REMOVED WHEN YOU FINALIZE YOUR NOTE. IF YOU WANT TO FAX A PRELIMINARY NOTE YOU WILL NEED TO TOGGLE THIS TO 'NO' IF YOU DO NOT WANT IT IN YOUR FAXED NOTE.
Total Number Of Fractions: 20
Energy (Optional-Please Include Units): 70kV
Time Dose Fractionation (Optional- Include Units If Applicable): 47
Total Dose (Optional-Please Include Units) Rx 2: 2685.6cGy
Patient Positioning: Supine
Assessment: Appropriate reaction
Custom Shielding Preamble Text Will Not Be Included With Simple Simulations (.......... X X Y Cm): A lead shield of 0.762 mm thickness is utilized to form a molded, custom shield with a
Fractions / Week Rx 2: 3
Pathology Override (Pathology Will Render As Diagnosis Name If Left Blank): Superficial Basal Cell Carcinoma
Total Dose (Optional-Please Include Units) Rx 2: cGy
Treatment Time In Min (Optional): 0.37
Time Dose Fractionation (Optional- Include Units If Applicable): 95
Day Of The Week Treatment Administered: Wednesday
Detail Level: Detailed
Shielding Size (Optional- Include Units) Rx 2: 1.5cm x 1.5cm
Fractionation Number: 11
Dose Per Fractionation In Cgy (Optional): 268.75
Simple Simulation Preamble Text Will Be Included With Simple Simulations (.......... Indications): Simple simulation was performed today for the following reasons:
Comments: On Target, RTOG 1
Treatment Time / Fractionation (Optional- Include Units): 0.43min
Field Size (Applicator): 2.0 cm
Daily Fractionated Dose (Optional- Include Units) Rx 2: 268.56cGy
Daily Fractionated Dose (Optional- Include Units): 268.75cGy
Cumulative Dose In Cgy (Optional): 2956.25
Treatment Time / Fractionation (Optional- Include Units): 0.37min
Intro Statement (Will Not Render If Left Blank): The patient is undergoing superficial radiation therapy for skin cancer and presents for weekly evaluation and management.  Per protocol and as documented on the flow sheet, the patient was questioned as to subjective redness, pruritus, pain, drainage, fatigue, or any other symptoms.  Objectively, the radiation area was evaluated with regards to erythema, atrophy, scale, crusting, erosion, ulceration, edema, purpura, tenderness, warmth, drainage, and any other findings.  The plan was extensively reviewed including the dose, and dosing schedule.  The simulation and clinical setup was also reviewed as was the external and any internal shields and based on this review the appropriateness and sufficiency of treatment was determined.
Custom Shielding Afterword Text Will Not Be Included With Simple Simulations (X X Y Cm............): port to correlate with the lesion size, including treatment margin. The custom lead shield is adequate to accommodate the appropriate applicator and provide adequate shielding around the treatment site. Additional shielding (as noted below) is used to protect sensitive, normal tissues.
Total Dose (Optional-Please Include Units): 5394.6cGy
Daily Fractionated Dose (Optional- Include Units): 269.75cGy
Cumulative Dose In Cgy (Optional): 2967.03
Pathology Override (Pathology Will Render As Diagnosis Name If Left Blank): Basal Cell Carcinoma Nodular
Treatment Time / Fractionation (Optional- Include Units) Rx 2: 0.36min
Field Number: 2
Shielding Size (Optional- Include Units): 1.5cm x1.5cm

## 2019-10-17 ENCOUNTER — APPOINTMENT (OUTPATIENT)
Age: 69
Setting detail: DERMATOLOGY
End: 2019-10-17

## 2019-10-17 PROCEDURE — OTHER SUPERFICIAL RADIATION TREATMENT: OTHER

## 2019-10-17 PROCEDURE — OTHER TREATMENT REGIMEN: OTHER

## 2019-10-17 PROCEDURE — OTHER FOLLOW UP FOR NEXT VISIT: OTHER

## 2019-10-17 NOTE — PROCEDURE: SUPERFICIAL RADIATION TREATMENT
Pathology Override (Pathology Will Render As Diagnosis Name If Left Blank): Superficial Basal Cell Carcinoma
Comments: On Target, RTOG 1
Bill For Radiation Treatment: Yes
Information: Selecting Yes will display possible errors in your note based on the variables you have selected. This validation is only offered as a suggestion for you. PLEASE NOTE THAT THE VALIDATION TEXT WILL BE REMOVED WHEN YOU FINALIZE YOUR NOTE. IF YOU WANT TO FAX A PRELIMINARY NOTE YOU WILL NEED TO TOGGLE THIS TO 'NO' IF YOU DO NOT WANT IT IN YOUR FAXED NOTE.
Computed Treatment Time In Min (Will Render The Same As Calculated Treatment Time If Left Blank): 0.37
Total Dose (Optional-Please Include Units): 5394.6cGy
Detail Level: Detailed
Field Size (Applicator) Rx 2: 2.0 cm
Initial Radiation Treatment Planning (Will Render If Bill Simulation = Yes): The patient had a complete consultation regarding all applicable modalities for the treatment of their skin cancer and based on a variety of factors including the type of tumor, size, and location, the relevant medical history as well as local tissue factors, the functional status of the individual, the ability to perform necessary postoperative wound instructions and the need for simultaneous treatments as well as overall wound healing status, it was determined that the patient would begin radiation therapy treatment for skin cancer.  A full simulation and treatment device design was performed including the determination and formulation of appropriate simple and complex devices including lead shield of 0.762 mm thickness to form molded customized shielding to specifically correlate with the lesion size including treatment margin.  The custom lead shield is adequate to accommodate the appropriate applicator and provide adequate shielding around the treatment site.  The specific field applicator, shields, and devices both simple and complex as well as the specific patient setup is outlined below.  The patient was given a full consent for superficial radiation to both verbally and in writing and the full determination of patient's eligibility for treatment and selection is outlined on the patient eligibility and treatment selection form.  The specific superficial radiotherapy prescription was determined and was documented on the superficial radiotherapy prescription form.  A treatment calculation was also performed and documented on the treatment calculation form.  Based on the prescription, the patient was scheduled for a series of fractional treatments.
Shielding Size (Optional- Include Units): 1.5cm x1.5cm
Daily Fractionated Dose (Optional- Include Units) Rx 2: cGy
Render Text From Evaluation And Management Tab (Will Not Bill [id number]): No
Dose Per Fractionation In Cgy (Optional): 269.73
Time Dose Fractionation (Optional- Include Units If Applicable): 95
Energy (Optional-Please Include Units) Rx 2: 50Kv
Computed Treatment Time In Min (Will Render The Same As Calculated Treatment Time If Left Blank): 0.36
Cumulative Dose In Cgy (Optional): 2967.03
Field Size (Applicator): 1.5 cm
Prescription Used: 1
Total Number Of Fractions Rx 2: 10
Intro Statement (Will Not Render If Left Blank): The patient is undergoing superficial radiation therapy for skin cancer and presents for weekly evaluation and management.  Per protocol and as documented on the flow sheet, the patient was questioned as to subjective redness, pruritus, pain, drainage, fatigue, or any other symptoms.  Objectively, the radiation area was evaluated with regards to erythema, atrophy, scale, crusting, erosion, ulceration, edema, purpura, tenderness, warmth, drainage, and any other findings.  The plan was extensively reviewed including the dose, and dosing schedule.  The simulation and clinical setup was also reviewed as was the external and any internal shields and based on this review the appropriateness and sufficiency of treatment was determined.
Daily Fractionated Dose (Optional- Include Units): 268.75cGy
Custom Shielding Afterword Text Will Not Be Included With Simple Simulations (X X Y Cm............): port to correlate with the lesion size, including treatment margin. The custom lead shield is adequate to accommodate the appropriate applicator and provide adequate shielding around the treatment site. Additional shielding (as noted below) is used to protect sensitive, normal tissues.
Day Of The Week Treatment Administered: Friday
Fractionation Number: 11
Treatment Margins In Cm: 0.5
Fractionation Number (Evaluation): 5
Shielding Size (Optional- Include Units) Rx 2: 1.5cm x 1.5cm
Fractions / Week: 3
Time Dose Fractionation (Optional- Include Units If Applicable) Rx 2: 47
Energy (Optional-Please Include Units): 70kV
Total Number Of Fractions Rx 3: 15
Depth (Optional-Please Include Units) Rx 2: 1.35mm
Custom Shielding Preamble Text Will Not Be Included With Simple Simulations (.......... X X Y Cm): A lead shield of 0.762 mm thickness is utilized to form a molded, custom shield with a
Dose / Tx In Cgy (Optional): 268.56
Assessment: Appropriate reaction
Total Number Of Fractions: 20
Daily Fractionated Dose (Optional- Include Units) Rx 2: 268.56cGy
Cumulative Dose In Cgy (Optional): 2956.06
Total Dose (Optional-Please Include Units): 2687.5cGy
Simple Simulation Preamble Text Will Be Included With Simple Simulations (.......... Indications): Simple simulation was performed today for the following reasons:
Functional Status: 0 (fully active)
Field Number: 2
Patient Positioning: Supine
Treatment Time / Fractionation (Optional- Include Units): 0.43min
Treatment Time In Min (Optional): 036
Pathology Override (Pathology Will Render As Diagnosis Name If Left Blank): Basal Cell Carcinoma Nodular
Treatment Time / Fractionation (Optional- Include Units) Rx 2: min
Treatment Time / Fractionation (Optional- Include Units): 0.37min
Treatment Device Design After Initial Simulation Justification (Will Render If Bill For Treatment Devices = Yes): The patient is status post radiation simulation and is evaluated as to the use of additional devices for shielding and placement for radiation therapy.
Total Dose (Optional-Please Include Units) Rx 2: 2685.6cGy
Treatment Time / Fractionation (Optional- Include Units) Rx 2: 0.36min
Daily Fractionated Dose (Optional- Include Units): 269.75cGy

## 2019-10-17 NOTE — PROCEDURE: TREATMENT REGIMEN
Detail Level: Zone
Plan: This patient has been treated today with image guided superficial radiation therapy for non-melanoma skin cancer. Written informed consent has been previously obtained from this patient for this treatment. This consent is documented in the patient’s chart. The patient gave verbal consent to continue treatment today.\\n\\n\\nThe patient was treated with a specific radiation dose and setup as prescribed by the provider listed on this visit note. A Radiation Therapist performed administration of radiation under supervision of provider. The treatment parameters and cumulative dose are indicated above.\\n\\n\\nPrior to administering the radiation, the patient underwent a verification therapeutic radiology simulation-aided field setting defining relevant normal and abnormal target anatomy and acquiring images with high frequency ultrasound in addition to data necessary developing optimal radiation treatment process for the patient. This process includes verification of the treatment port(s) and proper treatment positioning. All treatment ports were photographed within electronic medical record. The patient’s customized lead blocking along with gross tumor volume and margin was confirmed. Considering superficial radiotherapy is clinical in setup, this requires physician and radiation therapist to clarify location interest being treated against initial images, pathology and patient anatomy. Care was taken ensuring fields treated were geometrically accurate and properly positioned using therapeutic radiology simulation-aided field setting verification per fraction. This process is also utilized to determine if any prescription or setup changes are necessary. These steps are therefore medically necessary ensuring safe and effective administration of radiation. Ongoing therapeutic radiology simulation-aided field setting verification is ordered throughout course of therapy.\\n\\nNo US due to location \\n\\nThe field placement and ultrasound imaging, per fraction, is separate and distinct from the initial simulation, and is an important task in providing safe administration of superficial radiation therapy. Physician evaluation of the ultrasound tumor depth will be ongoing throughout the course of treatment and is deemed medically necessary in order to ensure the efficacy of treatment and any necessary changes. Today’s image was evaluated for determination of continuation of treatment with the current plan or with necessary changes as appropriate. \\n\\n\\nAccording to provider review of verification therapeutic radiology simulation-aided field setting and imaging, NO change is required. Additionally, the use of ultrasound visualization and targeted assessment allows the patient to be able to see their cancer(s) progress, encouraging patient to complete and maintain compliance through full course of radiotherapy.\\n\\nPer Dr. Reid, continued ultrasound guidance and therapeutic radiology simulation-aided field setting verification per fraction is required for field placement, measurement of tumor depth, progress and acute effect monitoring. \\n\\n
Plan: This patient has been treated today with image guided superficial radiation therapy for non-melanoma skin cancer. Written informed consent has been previously obtained from this patient for this treatment. This consent is documented in the patient’s chart. The patient gave verbal consent to continue treatment today.\\n\\n\\nThe patient was treated with a specific radiation dose and setup as prescribed by the provider listed on this visit note. A Radiation Therapist performed administration of radiation under supervision of provider. The treatment parameters and cumulative dose are indicated above.\\n\\n\\nPrior to administering the radiation, the patient underwent a verification therapeutic radiology simulation-aided field setting defining relevant normal and abnormal target anatomy and acquiring images with high frequency ultrasound in addition to data necessary developing optimal radiation treatment process for the patient. This process includes verification of the treatment port(s) and proper treatment positioning. All treatment ports were photographed within electronic medical record. The patient’s customized lead blocking along with gross tumor volume and margin was confirmed. Considering superficial radiotherapy is clinical in setup, this requires physician and radiation therapist to clarify location interest being treated against initial images, pathology and patient anatomy. Care was taken ensuring fields treated were geometrically accurate and properly positioned using therapeutic radiology simulation-aided field setting verification per fraction. This process is also utilized to determine if any prescription or setup changes are necessary. These steps are therefore medically necessary ensuring safe and effective administration of radiation. Ongoing therapeutic radiology simulation-aided field setting verification is ordered throughout course of therapy.\\n\\n\\nA high frequency ultrasound image was acquired today for two-dimensional evaluation of the tumor volume and response to treatment, in addition to geometric accuracy of field placement. \\n\\nUS depth is 1.03mm, which is 0.16mm in difference from previous imaging. Repop +\\n\\nThe field placement and ultrasound imaging, per fraction, is separate and distinct from the initial simulation, and is an important task in providing safe administration of superficial radiation therapy. Physician evaluation of the ultrasound tumor depth will be ongoing throughout the course of treatment and is deemed medically necessary in order to ensure the efficacy of treatment and any necessary changes. Today’s image was evaluated for determination of continuation of treatment with the current plan or with necessary changes as appropriate. \\n\\n\\nAccording to provider review of verification therapeutic radiology simulation-aided field setting and imaging, NO  change is required. Additionally, the use of ultrasound visualization and targeted assessment allows the patient to be able to see their cancer(s) progress, encouraging patient to complete and maintain compliance through full course of radiotherapy.\\n\\nPer Dr. Reid, continued ultrasound guidance and therapeutic radiology simulation-aided field setting verification per fraction is required for field placement, measurement of tumor depth, progress and acute effect monitoring.\\n

## 2019-10-18 ENCOUNTER — APPOINTMENT (OUTPATIENT)
Age: 69
Setting detail: DERMATOLOGY
End: 2019-10-18

## 2019-10-18 PROBLEM — C44.1122 BASAL CELL CARCINOMA OF SKIN OF RIGHT LOWER EYELID, INCLUDING CANTHUS: Status: ACTIVE | Noted: 2019-10-18

## 2019-10-18 PROBLEM — C44.719 BASAL CELL CARCINOMA OF SKIN OF LEFT LOWER LIMB, INCLUDING HIP: Status: ACTIVE | Noted: 2019-10-18

## 2019-10-18 PROCEDURE — G6001 ECHO GUIDANCE RADIOTHERAPY: HCPCS

## 2019-10-18 PROCEDURE — OTHER TREATMENT REGIMEN: OTHER

## 2019-10-18 PROCEDURE — 77280 THER RAD SIMULAJ FIELD SMPL: CPT

## 2019-10-18 PROCEDURE — OTHER FOLLOW UP FOR NEXT VISIT: OTHER

## 2019-10-18 PROCEDURE — 77401 RADIATION TX DELIVERY SUPFC: CPT

## 2019-10-18 PROCEDURE — OTHER SUPERFICIAL RADIATION TREATMENT: OTHER

## 2019-10-18 NOTE — PROCEDURE: SUPERFICIAL RADIATION TREATMENT
Bill For Dosimetry/Render Treatment Time Calculation In Note: No
Bill For Radiation Treatment: Yes
Patient Positioning: Supine
Treatment Margins In Cm: 0.5
Number Of Days Off Treatment: 1
Dose / Tx In Cgy (Optional): 268.56
Energy (Optional-Please Include Units): 50kV
Functional Status: 0 (fully active)
Dose Per Fractionation In Cgy (Optional): 269.73
Treatment Time In Min (Optional): 0.36
Total Dose (Optional-Please Include Units) Rx 2: cGy
Time Dose Fractionation (Optional- Include Units If Applicable) Rx 2: 47
Fractions / Week Rx 3: 5
Pathology Override (Pathology Will Render As Diagnosis Name If Left Blank): Superficial Basal Cell Carcinoma
Field Number: 2
Total Dose (Optional-Please Include Units) Rx 2: 2685.6cGy
Fractions / Week Rx 2: 3
Cumulative Dose In Cgy (Optional): 2956.06
Fractionation Number: 11
Field Size (Applicator): 2.0 cm
Daily Fractionated Dose (Optional- Include Units) Rx 2: 268.56cGy
Simple Simulation Preamble Text Will Be Included With Simple Simulations (.......... Indications): Simple simulation was performed today for the following reasons:
Intro Statement (Will Not Render If Left Blank): The patient is undergoing superficial radiation therapy for skin cancer and presents for weekly evaluation and management.  Per protocol and as documented on the flow sheet, the patient was questioned as to subjective redness, pruritus, pain, drainage, fatigue, or any other symptoms.  Objectively, the radiation area was evaluated with regards to erythema, atrophy, scale, crusting, erosion, ulceration, edema, purpura, tenderness, warmth, drainage, and any other findings.  The plan was extensively reviewed including the dose, and dosing schedule.  The simulation and clinical setup was also reviewed as was the external and any internal shields and based on this review the appropriateness and sufficiency of treatment was determined.
Custom Shielding Preamble Text Will Not Be Included With Simple Simulations (.......... X X Y Cm): A lead shield of 0.762 mm thickness is utilized to form a molded, custom shield with a
Custom Shielding Afterword Text Will Not Be Included With Simple Simulations (X X Y Cm............): port to correlate with the lesion size, including treatment margin. The custom lead shield is adequate to accommodate the appropriate applicator and provide adequate shielding around the treatment site. Additional shielding (as noted below) is used to protect sensitive, normal tissues.
Shielding Size (Optional- Include Units): 1.5cm x1.5cm
Detail Level: Detailed
Field Size (Applicator): 1.5 cm
Treatment Time In Min (Optional): 0.37
Total Number Of Fractions Rx 4: 15
Day Of The Week Treatment Administered: Friday
Depth (Optional-Please Include Units): 1.35mm
Total Dose (Optional-Please Include Units): 5394.6cGy
Initial Radiation Treatment Planning (Will Render If Bill Simulation = Yes): The patient had a complete consultation regarding all applicable modalities for the treatment of their skin cancer and based on a variety of factors including the type of tumor, size, and location, the relevant medical history as well as local tissue factors, the functional status of the individual, the ability to perform necessary postoperative wound instructions and the need for simultaneous treatments as well as overall wound healing status, it was determined that the patient would begin radiation therapy treatment for skin cancer.  A full simulation and treatment device design was performed including the determination and formulation of appropriate simple and complex devices including lead shield of 0.762 mm thickness to form molded customized shielding to specifically correlate with the lesion size including treatment margin.  The custom lead shield is adequate to accommodate the appropriate applicator and provide adequate shielding around the treatment site.  The specific field applicator, shields, and devices both simple and complex as well as the specific patient setup is outlined below.  The patient was given a full consent for superficial radiation to both verbally and in writing and the full determination of patient's eligibility for treatment and selection is outlined on the patient eligibility and treatment selection form.  The specific superficial radiotherapy prescription was determined and was documented on the superficial radiotherapy prescription form.  A treatment calculation was also performed and documented on the treatment calculation form.  Based on the prescription, the patient was scheduled for a series of fractional treatments.
Total Number Of Fractions Rx 2: 10
Treatment Time / Fractionation (Optional- Include Units): 0.43min
Pathology Override (Pathology Will Render As Diagnosis Name If Left Blank): Basal Cell Carcinoma Nodular
Treatment Time / Fractionation (Optional- Include Units) Rx 2: 0.36min
Treatment Time / Fractionation (Optional- Include Units): 0.37min
Information: Selecting Yes will display possible errors in your note based on the variables you have selected. This validation is only offered as a suggestion for you. PLEASE NOTE THAT THE VALIDATION TEXT WILL BE REMOVED WHEN YOU FINALIZE YOUR NOTE. IF YOU WANT TO FAX A PRELIMINARY NOTE YOU WILL NEED TO TOGGLE THIS TO 'NO' IF YOU DO NOT WANT IT IN YOUR FAXED NOTE.
Assessment: Appropriate reaction
Comments: On Target, RTOG 1
Daily Fractionated Dose (Optional- Include Units): 268.75cGy
Total Number Of Fractions: 20
Treatment Device Design After Initial Simulation Justification (Will Render If Bill For Treatment Devices = Yes): The patient is status post radiation simulation and is evaluated as to the use of additional devices for shielding and placement for radiation therapy.
Time Dose Fractionation (Optional- Include Units If Applicable): 95
Daily Fractionated Dose (Optional- Include Units): 269.75cGy
Cumulative Dose In Cgy (Optional): 2967.03
Treatment Time / Fractionation (Optional- Include Units) Rx 2: min
Total Dose (Optional-Please Include Units): 2687.5cGy
Shielding Size (Optional- Include Units) Rx 2: 1.5cm x 1.5cm
Energy (Optional-Please Include Units): 70kV

## 2019-10-21 ENCOUNTER — APPOINTMENT (OUTPATIENT)
Age: 69
Setting detail: DERMATOLOGY
End: 2019-10-23

## 2019-10-21 PROBLEM — C44.719 BASAL CELL CARCINOMA OF SKIN OF LEFT LOWER LIMB, INCLUDING HIP: Status: ACTIVE | Noted: 2019-10-21

## 2019-10-21 PROBLEM — C44.1122 BASAL CELL CARCINOMA OF SKIN OF RIGHT LOWER EYELID, INCLUDING CANTHUS: Status: ACTIVE | Noted: 2019-10-21

## 2019-10-21 PROCEDURE — OTHER FOLLOW UP FOR NEXT VISIT: OTHER

## 2019-10-21 PROCEDURE — 77401 RADIATION TX DELIVERY SUPFC: CPT

## 2019-10-21 PROCEDURE — OTHER TREATMENT REGIMEN: OTHER

## 2019-10-21 PROCEDURE — 77280 THER RAD SIMULAJ FIELD SMPL: CPT

## 2019-10-21 PROCEDURE — G6001 ECHO GUIDANCE RADIOTHERAPY: HCPCS

## 2019-10-21 PROCEDURE — OTHER SUPERFICIAL RADIATION TREATMENT: OTHER

## 2019-10-21 NOTE — PROCEDURE: SUPERFICIAL RADIATION TREATMENT
Dose / Tx In Cgy (Optional): 269.73
Total Number Of Fractions Rx 2: 10
Depth (Optional-Please Include Units) Rx 2: 1.35mm
Vinh For Simulation Without Treatment Device Design (Simple Simulation): No
Total Dose (Optional-Please Include Units): 5394.6cGy
Field Size (Applicator): 1.5 cm
Treatment Time / Fractionation (Optional- Include Units) Rx 2: 0.36min
Shielding Size (Optional- Include Units) Rx 2: 1.5cm x 1.5cm
Detail Level: Detailed
Energy (Include Units): 50Kv
Prescription Used: 1
Time Dose Fractionation (Optional- Include Units If Applicable): 95
Fractions / Week: 3
Custom Shielding Preamble Text Will Not Be Included With Simple Simulations (.......... X X Y Cm): A lead shield of 0.762 mm thickness is utilized to form a molded, custom shield with a
Field Size (Applicator): 2.0 cm
Custom Shielding Afterword Text Will Not Be Included With Simple Simulations (X X Y Cm............): port to correlate with the lesion size, including treatment margin. The custom lead shield is adequate to accommodate the appropriate applicator and provide adequate shielding around the treatment site. Additional shielding (as noted below) is used to protect sensitive, normal tissues.
Daily Fractionated Dose (Optional- Include Units): 269.75cGy
Total Dose (Optional-Please Include Units): 2687.5cGy
Shielding Size (Optional- Include Units): 1.5cm x1.5cm
Time Dose Fractionation (Optional- Include Units If Applicable): 47
Cumulative Dose In Cgy (Optional): 3236.76
Assessment: Appropriate reaction
Initial Radiation Treatment Planning (Will Render If Bill Simulation = Yes): The patient had a complete consultation regarding all applicable modalities for the treatment of their skin cancer and based on a variety of factors including the type of tumor, size, and location, the relevant medical history as well as local tissue factors, the functional status of the individual, the ability to perform necessary postoperative wound instructions and the need for simultaneous treatments as well as overall wound healing status, it was determined that the patient would begin radiation therapy treatment for skin cancer.  A full simulation and treatment device design was performed including the determination and formulation of appropriate simple and complex devices including lead shield of 0.762 mm thickness to form molded customized shielding to specifically correlate with the lesion size including treatment margin.  The custom lead shield is adequate to accommodate the appropriate applicator and provide adequate shielding around the treatment site.  The specific field applicator, shields, and devices both simple and complex as well as the specific patient setup is outlined below.  The patient was given a full consent for superficial radiation to both verbally and in writing and the full determination of patient's eligibility for treatment and selection is outlined on the patient eligibility and treatment selection form.  The specific superficial radiotherapy prescription was determined and was documented on the superficial radiotherapy prescription form.  A treatment calculation was also performed and documented on the treatment calculation form.  Based on the prescription, the patient was scheduled for a series of fractional treatments.
Fractions / Week Rx 4: 5
Comments: On Target, RTOG 1
Treatment Time / Fractionation (Optional- Include Units): 0.43min
Daily Fractionated Dose (Optional- Include Units) Rx 2: 268.56cGy
Fractionation Number: 12
Patient Positioning: Supine
Total Number Of Fractions Rx 4: 15
Dose / Tx In Cgy (Optional): 268.56
Information: Selecting Yes will display possible errors in your note based on the variables you have selected. This validation is only offered as a suggestion for you. PLEASE NOTE THAT THE VALIDATION TEXT WILL BE REMOVED WHEN YOU FINALIZE YOUR NOTE. IF YOU WANT TO FAX A PRELIMINARY NOTE YOU WILL NEED TO TOGGLE THIS TO 'NO' IF YOU DO NOT WANT IT IN YOUR FAXED NOTE.
Daily Fractionated Dose (Optional- Include Units): 268.75cGy
Treatment Time / Fractionation (Optional- Include Units) Rx 2: min
Treatment Margins In Cm: 0.5
Energy (Optional-Please Include Units): 70kV
Computed Treatment Time In Min (Will Render The Same As Calculated Treatment Time If Left Blank): 0.36
Pathology Override (Pathology Will Render As Diagnosis Name If Left Blank): Superficial Basal Cell Carcinoma
Treatment Device Design After Initial Simulation Justification (Will Render If Bill For Treatment Devices = Yes): The patient is status post radiation simulation and is evaluated as to the use of additional devices for shielding and placement for radiation therapy.
Functional Status: 0 (fully active)
Treatment Time / Fractionation (Optional- Include Units): 0.37min
Day Of The Week Treatment Administered: Monday
Treatment Time In Min (Optional): 0.37
Intro Statement (Will Not Render If Left Blank): The patient is undergoing superficial radiation therapy for skin cancer and presents for weekly evaluation and management.  Per protocol and as documented on the flow sheet, the patient was questioned as to subjective redness, pruritus, pain, drainage, fatigue, or any other symptoms.  Objectively, the radiation area was evaluated with regards to erythema, atrophy, scale, crusting, erosion, ulceration, edema, purpura, tenderness, warmth, drainage, and any other findings.  The plan was extensively reviewed including the dose, and dosing schedule.  The simulation and clinical setup was also reviewed as was the external and any internal shields and based on this review the appropriateness and sufficiency of treatment was determined.
Total Dose (Optional-Please Include Units) Rx 2: cGy
Bill For Radiation Treatment: Yes
Simple Simulation Preamble Text Will Be Included With Simple Simulations (.......... Indications): Simple simulation was performed today for the following reasons:
Total Dose (Optional-Please Include Units) Rx 2: 2685.6cGy
Total Number Of Fractions: 20
Field Number: 2
Pathology Override (Pathology Will Render As Diagnosis Name If Left Blank): Basal Cell Carcinoma Nodular
Cumulative Dose In Cgy (Optional): 3224.62

## 2019-10-21 NOTE — PROCEDURE: TREATMENT REGIMEN
Plan: This patient has been treated today with image guided superficial radiation therapy for non-melanoma skin cancer. Written informed consent has been previously obtained from this patient for this treatment. This consent is documented in the patient’s chart. The patient gave verbal consent to continue treatment today.\\n\\n\\nThe patient was treated with a specific radiation dose and setup as prescribed by the provider listed on this visit note. A Radiation Therapist performed administration of radiation under supervision of provider. The treatment parameters and cumulative dose are indicated above.\\n\\n\\nPrior to administering the radiation, the patient underwent a verification therapeutic radiology simulation-aided field setting defining relevant normal and abnormal target anatomy and acquiring images with high frequency ultrasound in addition to data necessary developing optimal radiation treatment process for the patient. This process includes verification of the treatment port(s) and proper treatment positioning. All treatment ports were photographed within electronic medical record. The patient’s customized lead blocking along with gross tumor volume and margin was confirmed. Considering superficial radiotherapy is clinical in setup, this requires physician and radiation therapist to clarify location interest being treated against initial images, pathology and patient anatomy. Care was taken ensuring fields treated were geometrically accurate and properly positioned using therapeutic radiology simulation-aided field setting verification per fraction. This process is also utilized to determine if any prescription or setup changes are necessary. These steps are therefore medically necessary ensuring safe and effective administration of radiation. Ongoing therapeutic radiology simulation-aided field setting verification is ordered throughout course of therapy.\\n\\nNo US due to location \\n\\nThe field placement and ultrasound imaging, per fraction, is separate and distinct from the initial simulation, and is an important task in providing safe administration of superficial radiation therapy. Physician evaluation of the ultrasound tumor depth will be ongoing throughout the course of treatment and is deemed medically necessary in order to ensure the efficacy of treatment and any necessary changes. Today’s image was evaluated for determination of continuation of treatment with the current plan or with necessary changes as appropriate. \\n\\n\\nAccording to provider review of verification therapeutic radiology simulation-aided field setting and imaging, NO change is required. Additionally, the use of ultrasound visualization and targeted assessment allows the patient to be able to see their cancer(s) progress, encouraging patient to complete and maintain compliance through full course of radiotherapy.\\n\\nPer Dr. Reid, continued ultrasound guidance and therapeutic radiology simulation-aided field setting verification per fraction is required for field placement, measurement of tumor depth, progress and acute effect monitoring. \\n\\n
Detail Level: Zone
Plan: This patient has been treated today with image guided superficial radiation therapy for non-melanoma skin cancer. Written informed consent has been previously obtained from this patient for this treatment. This consent is documented in the patient’s chart. The patient gave verbal consent to continue treatment today.\\n\\n\\nThe patient was treated with a specific radiation dose and setup as prescribed by the provider listed on this visit note. A Radiation Therapist performed administration of radiation under supervision of provider. The treatment parameters and cumulative dose are indicated above.\\n\\n\\nPrior to administering the radiation, the patient underwent a verification therapeutic radiology simulation-aided field setting defining relevant normal and abnormal target anatomy and acquiring images with high frequency ultrasound in addition to data necessary developing optimal radiation treatment process for the patient. This process includes verification of the treatment port(s) and proper treatment positioning. All treatment ports were photographed within electronic medical record. The patient’s customized lead blocking along with gross tumor volume and margin was confirmed. Considering superficial radiotherapy is clinical in setup, this requires physician and radiation therapist to clarify location interest being treated against initial images, pathology and patient anatomy. Care was taken ensuring fields treated were geometrically accurate and properly positioned using therapeutic radiology simulation-aided field setting verification per fraction. This process is also utilized to determine if any prescription or setup changes are necessary. These steps are therefore medically necessary ensuring safe and effective administration of radiation. Ongoing therapeutic radiology simulation-aided field setting verification is ordered throughout course of therapy.\\n\\n\\nA high frequency ultrasound image was acquired today for two-dimensional evaluation of the tumor volume and response to treatment, in addition to geometric accuracy of field placement. \\n\\nUS depth is 1.06mm (edema), which is 0.2mm in difference from previous imaging. Repop +++\\n\\nThe field placement and ultrasound imaging, per fraction, is separate and distinct from the initial simulation, and is an important task in providing safe administration of superficial radiation therapy. Physician evaluation of the ultrasound tumor depth will be ongoing throughout the course of treatment and is deemed medically necessary in order to ensure the efficacy of treatment and any necessary changes. Today’s image was evaluated for determination of continuation of treatment with the current plan or with necessary changes as appropriate. \\n\\n\\nAccording to provider review of verification therapeutic radiology simulation-aided field setting and imaging, energy change is required.  Decrease in energy from 70kV to 50kV due to prescription protocol. Additionally, the use of ultrasound visualization and targeted assessment allows the patient to be able to see their cancer(s) progress, encouraging patient to complete and maintain compliance through full course of radiotherapy.\\n\\nPer Dr. Reid, continued ultrasound guidance and therapeutic radiology simulation-aided field setting verification per fraction is required for field placement, measurement of tumor depth, progress and acute effect monitoring.\\n

## 2019-10-23 ENCOUNTER — APPOINTMENT (OUTPATIENT)
Age: 69
Setting detail: DERMATOLOGY
End: 2019-10-23

## 2019-10-23 PROBLEM — C44.1122 BASAL CELL CARCINOMA OF SKIN OF RIGHT LOWER EYELID, INCLUDING CANTHUS: Status: ACTIVE | Noted: 2019-10-23

## 2019-10-23 PROBLEM — D48.5 NEOPLASM OF UNCERTAIN BEHAVIOR OF SKIN: Status: ACTIVE | Noted: 2019-10-23

## 2019-10-23 PROBLEM — C44.719 BASAL CELL CARCINOMA OF SKIN OF LEFT LOWER LIMB, INCLUDING HIP: Status: ACTIVE | Noted: 2019-10-23

## 2019-10-23 PROCEDURE — OTHER TREATMENT REGIMEN: OTHER

## 2019-10-23 PROCEDURE — OTHER SUPERFICIAL RADIATION TREATMENT: OTHER

## 2019-10-23 PROCEDURE — 77401 RADIATION TX DELIVERY SUPFC: CPT

## 2019-10-23 PROCEDURE — G6001 ECHO GUIDANCE RADIOTHERAPY: HCPCS

## 2019-10-23 PROCEDURE — OTHER FOLLOW UP FOR NEXT VISIT: OTHER

## 2019-10-23 PROCEDURE — 77280 THER RAD SIMULAJ FIELD SMPL: CPT

## 2019-10-23 NOTE — PROCEDURE: TREATMENT REGIMEN
Detail Level: Zone
Plan: This patient has been treated today with image guided superficial radiation therapy for non-melanoma skin cancer. Written informed consent has been previously obtained from this patient for this treatment. This consent is documented in the patient’s chart. The patient gave verbal consent to continue treatment today.\\n\\n\\nThe patient was treated with a specific radiation dose and setup as prescribed by the provider listed on this visit note. A Radiation Therapist performed administration of radiation under supervision of provider. The treatment parameters and cumulative dose are indicated above.\\n\\n\\nPrior to administering the radiation, the patient underwent a verification therapeutic radiology simulation-aided field setting defining relevant normal and abnormal target anatomy and acquiring images with high frequency ultrasound in addition to data necessary developing optimal radiation treatment process for the patient. This process includes verification of the treatment port(s) and proper treatment positioning. All treatment ports were photographed within electronic medical record. The patient’s customized lead blocking along with gross tumor volume and margin was confirmed. Considering superficial radiotherapy is clinical in setup, this requires physician and radiation therapist to clarify location interest being treated against initial images, pathology and patient anatomy. Care was taken ensuring fields treated were geometrically accurate and properly positioned using therapeutic radiology simulation-aided field setting verification per fraction. This process is also utilized to determine if any prescription or setup changes are necessary. These steps are therefore medically necessary ensuring safe and effective administration of radiation. Ongoing therapeutic radiology simulation-aided field setting verification is ordered throughout course of therapy.\\n\\n\\nA high frequency ultrasound image was acquired today for two-dimensional evaluation of the tumor volume and response to treatment, in addition to geometric accuracy of field placement. \\n\\nUS depth is 1.14mm, which is 0.08mm in difference from previous imaging. Repop +++\\n\\nThe field placement and ultrasound imaging, per fraction, is separate and distinct from the initial simulation, and is an important task in providing safe administration of superficial radiation therapy. Physician evaluation of the ultrasound tumor depth will be ongoing throughout the course of treatment and is deemed medically necessary in order to ensure the efficacy of treatment and any necessary changes. Today’s image was evaluated for determination of continuation of treatment with the current plan or with necessary changes as appropriate. \\n\\n\\nAccording to provider review of verification therapeutic radiology simulation-aided field setting and imaging, energy change is required.  Decrease in energy from 70kV to 50kV due to prescription protocol. Additionally, the use of ultrasound visualization and targeted assessment allows the patient to be able to see their cancer(s) progress, encouraging patient to complete and maintain compliance through full course of radiotherapy.\\n\\nPer Dr. Reid, continued ultrasound guidance and therapeutic radiology simulation-aided field setting verification per fraction is required for field placement, measurement of tumor depth, progress and acute effect monitoring.\\n
Plan: This patient has been treated today with image guided superficial radiation therapy for non-melanoma skin cancer. Written informed consent has been previously obtained from this patient for this treatment. This consent is documented in the patient’s chart. The patient gave verbal consent to continue treatment today.\\n\\n\\nThe patient was treated with a specific radiation dose and setup as prescribed by the provider listed on this visit note. A Radiation Therapist performed administration of radiation under supervision of provider. The treatment parameters and cumulative dose are indicated above.\\n\\n\\nPrior to administering the radiation, the patient underwent a verification therapeutic radiology simulation-aided field setting defining relevant normal and abnormal target anatomy and acquiring images with high frequency ultrasound in addition to data necessary developing optimal radiation treatment process for the patient. This process includes verification of the treatment port(s) and proper treatment positioning. All treatment ports were photographed within electronic medical record. The patient’s customized lead blocking along with gross tumor volume and margin was confirmed. Considering superficial radiotherapy is clinical in setup, this requires physician and radiation therapist to clarify location interest being treated against initial images, pathology and patient anatomy. Care was taken ensuring fields treated were geometrically accurate and properly positioned using therapeutic radiology simulation-aided field setting verification per fraction. This process is also utilized to determine if any prescription or setup changes are necessary. These steps are therefore medically necessary ensuring safe and effective administration of radiation. Ongoing therapeutic radiology simulation-aided field setting verification is ordered throughout course of therapy.\\n\\nNo US due to location \\n\\nThe field placement and ultrasound imaging, per fraction, is separate and distinct from the initial simulation, and is an important task in providing safe administration of superficial radiation therapy. Physician evaluation of the ultrasound tumor depth will be ongoing throughout the course of treatment and is deemed medically necessary in order to ensure the efficacy of treatment and any necessary changes. Today’s image was evaluated for determination of continuation of treatment with the current plan or with necessary changes as appropriate. \\n\\n\\nAccording to provider review of verification therapeutic radiology simulation-aided field setting and imaging, NO change is required. Additionally, the use of ultrasound visualization and targeted assessment allows the patient to be able to see their cancer(s) progress, encouraging patient to complete and maintain compliance through full course of radiotherapy.\\n\\nPer Dr. Reid, continued ultrasound guidance and therapeutic radiology simulation-aided field setting verification per fraction is required for field placement, measurement of tumor depth, progress and acute effect monitoring. \\n\\n

## 2019-10-23 NOTE — PROCEDURE: SUPERFICIAL RADIATION TREATMENT
Energy (Optional-Please Include Units): 50kV
Time Dose Fractionation (Optional- Include Units If Applicable) Rx 2: 47
Depth (Optional-Please Include Units) Rx 2: 1.35mm
Computed Treatment Time In Min (Will Render The Same As Calculated Treatment Time If Left Blank): 0.37
Total Number Of Fractions Rx 3: 15
Treatment Time / Fractionation (Optional- Include Units) Rx 2: 0.36min
Total Dose (Optional-Please Include Units) Rx 2: 2685.6cGy
Dose / Tx In Cgy (Optional): 269.73
Fractions / Week: 3
Vinh For Simulation Without Treatment Device Design (Simple Simulation): No
Treatment Time / Fractionation (Optional- Include Units): 0.37min
Detail Level: Detailed
Treatment Margins In Cm: 0.5
Fractionation Number: 13
Bill For Radiation Treatment: Yes
Patient Positioning: Supine
Simple Simulation Preamble Text Will Be Included With Simple Simulations (.......... Indications): Simple simulation was performed today for the following reasons:
Field Size (Applicator) Rx 2: 2.0 cm
Field Size (Applicator): 1.5 cm
Initial Radiation Treatment Planning (Will Render If Bill Simulation = Yes): The patient had a complete consultation regarding all applicable modalities for the treatment of their skin cancer and based on a variety of factors including the type of tumor, size, and location, the relevant medical history as well as local tissue factors, the functional status of the individual, the ability to perform necessary postoperative wound instructions and the need for simultaneous treatments as well as overall wound healing status, it was determined that the patient would begin radiation therapy treatment for skin cancer.  A full simulation and treatment device design was performed including the determination and formulation of appropriate simple and complex devices including lead shield of 0.762 mm thickness to form molded customized shielding to specifically correlate with the lesion size including treatment margin.  The custom lead shield is adequate to accommodate the appropriate applicator and provide adequate shielding around the treatment site.  The specific field applicator, shields, and devices both simple and complex as well as the specific patient setup is outlined below.  The patient was given a full consent for superficial radiation to both verbally and in writing and the full determination of patient's eligibility for treatment and selection is outlined on the patient eligibility and treatment selection form.  The specific superficial radiotherapy prescription was determined and was documented on the superficial radiotherapy prescription form.  A treatment calculation was also performed and documented on the treatment calculation form.  Based on the prescription, the patient was scheduled for a series of fractional treatments.
Custom Shielding Preamble Text Will Not Be Included With Simple Simulations (.......... X X Y Cm): A lead shield of 0.762 mm thickness is utilized to form a molded, custom shield with a
Daily Fractionated Dose (Optional- Include Units) Rx 2: 268.56cGy
Number Of Days Off Treatment: 1
Total Dose (Optional-Please Include Units) Rx 2: cGy
Fractions / Week Rx 3: 5
Field Number: 2
Assessment: Appropriate reaction
Comments: On Target, RTOG 1
Treatment Time In Min (Optional): 0.36
Daily Fractionated Dose (Optional- Include Units): 268.75cGy
Total Number Of Fractions Rx 2: 10
Pathology Override (Pathology Will Render As Diagnosis Name If Left Blank): Basal Cell Carcinoma Nodular
Treatment Device Design After Initial Simulation Justification (Will Render If Bill For Treatment Devices = Yes): The patient is status post radiation simulation and is evaluated as to the use of additional devices for shielding and placement for radiation therapy.
Day Of The Week Treatment Administered: Wednesday
Time Dose Fractionation (Optional- Include Units If Applicable): 95
Shielding Size (Optional- Include Units) Rx 2: 1.5cm x 1.5cm
Total Number Of Fractions: 20
Cumulative Dose In Cgy (Optional): 3506.49
Energy (Optional-Please Include Units): 70kV
Shielding Size (Optional- Include Units): 1.5cm x1.5cm
Functional Status: 0 (fully active)
Intro Statement (Will Not Render If Left Blank): The patient is undergoing superficial radiation therapy for skin cancer and presents for weekly evaluation and management.  Per protocol and as documented on the flow sheet, the patient was questioned as to subjective redness, pruritus, pain, drainage, fatigue, or any other symptoms.  Objectively, the radiation area was evaluated with regards to erythema, atrophy, scale, crusting, erosion, ulceration, edema, purpura, tenderness, warmth, drainage, and any other findings.  The plan was extensively reviewed including the dose, and dosing schedule.  The simulation and clinical setup was also reviewed as was the external and any internal shields and based on this review the appropriateness and sufficiency of treatment was determined.
Total Dose (Optional-Please Include Units): 5394.6cGy
Custom Shielding Afterword Text Will Not Be Included With Simple Simulations (X X Y Cm............): port to correlate with the lesion size, including treatment margin. The custom lead shield is adequate to accommodate the appropriate applicator and provide adequate shielding around the treatment site. Additional shielding (as noted below) is used to protect sensitive, normal tissues.
Information: Selecting Yes will display possible errors in your note based on the variables you have selected. This validation is only offered as a suggestion for you. PLEASE NOTE THAT THE VALIDATION TEXT WILL BE REMOVED WHEN YOU FINALIZE YOUR NOTE. IF YOU WANT TO FAX A PRELIMINARY NOTE YOU WILL NEED TO TOGGLE THIS TO 'NO' IF YOU DO NOT WANT IT IN YOUR FAXED NOTE.
Dose / Tx In Cgy (Optional): 268.56
Treatment Time / Fractionation (Optional- Include Units): 0.43min
Daily Fractionated Dose (Optional- Include Units): 269.75cGy
Cumulative Dose In Cgy (Optional): 3493.18
Pathology Override (Pathology Will Render As Diagnosis Name If Left Blank): Superficial Basal Cell Carcinoma
Treatment Time / Fractionation (Optional- Include Units) Rx 2: min
Total Dose (Optional-Please Include Units): 2687.5cGy

## 2019-10-25 ENCOUNTER — APPOINTMENT (OUTPATIENT)
Age: 69
Setting detail: DERMATOLOGY
End: 2019-10-29

## 2019-10-25 PROBLEM — C44.719 BASAL CELL CARCINOMA OF SKIN OF LEFT LOWER LIMB, INCLUDING HIP: Status: ACTIVE | Noted: 2019-10-25

## 2019-10-25 PROBLEM — C44.1122 BASAL CELL CARCINOMA OF SKIN OF RIGHT LOWER EYELID, INCLUDING CANTHUS: Status: ACTIVE | Noted: 2019-10-25

## 2019-10-25 PROCEDURE — 77401 RADIATION TX DELIVERY SUPFC: CPT

## 2019-10-25 PROCEDURE — OTHER SUPERFICIAL RADIATION TREATMENT: OTHER

## 2019-10-25 PROCEDURE — OTHER FOLLOW UP FOR NEXT VISIT: OTHER

## 2019-10-25 PROCEDURE — G6001 ECHO GUIDANCE RADIOTHERAPY: HCPCS

## 2019-10-25 PROCEDURE — 77280 THER RAD SIMULAJ FIELD SMPL: CPT

## 2019-10-25 PROCEDURE — OTHER TREATMENT REGIMEN: OTHER

## 2019-10-25 NOTE — PROCEDURE: SUPERFICIAL RADIATION TREATMENT
Simple Simulation Afterword Text Will Be Included With Simple Simulations (Indications............): The patient had a complete consultation regarding all applicable modalities for the treatment of their skin cancer and based on a variety of factors including the type of tumor, size, and location, the relevant medical history as well as local tissue factors, the functional status of the individual, the ability to perform necessary postoperative wound instructions and the need for simultaneous treatments as well as overall wound healing status, it was determined that the patient would begin radiation therapy treatment for skin cancer.  A full simulation and treatment device design was performed including the determination and formulation of appropriate simple and complex devices including lead shield of 0.762 mm thickness to form molded customized shielding to specifically correlate with the lesion size including treatment margin.  The custom lead shield is adequate to accommodate the appropriate applicator and provide adequate shielding around the treatment site.  The specific field applicator, shields, and devices both simple and complex as well as the specific patient setup is outlined below.  The patient was given a full consent for superficial radiation to both verbally and in writing and the full determination of patient's eligibility for treatment and selection is outlined on the patient eligibility and treatment selection form.  The specific superficial radiotherapy prescription was determined and was documented on the superficial radiotherapy prescription form.  A treatment calculation was also performed and documented on the treatment calculation form.  Based on the prescription, the patient was scheduled for a series of fractional treatments.
Bill And Render Text From Evaluation And Management Tab (Will Bill 42454): No
Comments: On Target, RTOG 1
Field Size (Applicator): 1.5 cm
Total Dose (Optional-Please Include Units) Rx 2: 2685.6cGy
Intro Statement (Will Not Render If Left Blank): The patient is undergoing superficial radiation therapy for skin cancer and presents for weekly evaluation and management.  Per protocol and as documented on the flow sheet, the patient was questioned as to subjective redness, pruritus, pain, drainage, fatigue, or any other symptoms.  Objectively, the radiation area was evaluated with regards to erythema, atrophy, scale, crusting, erosion, ulceration, edema, purpura, tenderness, warmth, drainage, and any other findings.  The plan was extensively reviewed including the dose, and dosing schedule.  The simulation and clinical setup was also reviewed as was the external and any internal shields and based on this review the appropriateness and sufficiency of treatment was determined.
Fractionation Number (Evaluation): 5
Daily Fractionated Dose (Optional- Include Units): 268.75cGy
Cumulative Dose In Cgy (Optional): 3776.22
Treatment Time In Min (Optional): 0.36
Information: Selecting Yes will display possible errors in your note based on the variables you have selected. This validation is only offered as a suggestion for you. PLEASE NOTE THAT THE VALIDATION TEXT WILL BE REMOVED WHEN YOU FINALIZE YOUR NOTE. IF YOU WANT TO FAX A PRELIMINARY NOTE YOU WILL NEED TO TOGGLE THIS TO 'NO' IF YOU DO NOT WANT IT IN YOUR FAXED NOTE.
Treatment Time / Fractionation (Optional- Include Units) Rx 2: min
Pathology Override (Pathology Will Render As Diagnosis Name If Left Blank): Superficial Basal Cell Carcinoma
Custom Shielding Preamble Text Will Not Be Included With Simple Simulations (.......... X X Y Cm): A lead shield of 0.762 mm thickness is utilized to form a molded, custom shield with a
Daily Fractionated Dose (Optional- Include Units) Rx 2: cGy
Number Of Days Off Treatment: 1
Dose / Tx In Cgy (Optional): 268.56
Bill For Radiation Treatment: Yes
Simple Simulation Preamble Text Will Be Included With Simple Simulations (.......... Indications): Simple simulation was performed today for the following reasons:
Field Size (Applicator) Rx 2: 2.0 cm
Depth (Optional-Please Include Units): 1.35mm
Energy (Include Units): 50Kv
Treatment Time / Fractionation (Optional- Include Units): 0.43min
Treatment Device Design After Initial Simulation Justification (Will Render If Bill For Treatment Devices = Yes): The patient is status post radiation simulation and is evaluated as to the use of additional devices for shielding and placement for radiation therapy.
Dose / Tx In Cgy (Optional): 269.73
Field Number: 2
Fractionation Number: 14
Time Dose Fractionation (Optional- Include Units If Applicable): 95
Total Number Of Fractions Rx 4: 15
Time Dose Fractionation (Optional- Include Units If Applicable): 47
Day Of The Week Treatment Administered: Friday
Total Number Of Fractions: 10
Cumulative Dose In Cgy (Optional): 3761.74
Functional Status: 0 (fully active)
Detail Level: Detailed
Energy (Optional-Please Include Units): 70kV
Shielding Size (Optional- Include Units): 1.5cm x1.5cm
Dimensions-X Axis In Cm: 0.5
Custom Shielding Afterword Text Will Not Be Included With Simple Simulations (X X Y Cm............): port to correlate with the lesion size, including treatment margin. The custom lead shield is adequate to accommodate the appropriate applicator and provide adequate shielding around the treatment site. Additional shielding (as noted below) is used to protect sensitive, normal tissues.
Total Dose (Optional-Please Include Units): 5394.6cGy
Total Number Of Fractions: 20
Assessment: Appropriate reaction
Pathology Override (Pathology Will Render As Diagnosis Name If Left Blank): Basal Cell Carcinoma Nodular
Daily Fractionated Dose (Optional- Include Units): 269.75cGy
Shielding Size (Optional- Include Units) Rx 2: 1.5cm x 1.5cm
Treatment Time / Fractionation (Optional- Include Units): 0.37min
Patient Positioning: Supine
Treatment Time In Min (Optional): 0.37
Fractions / Week Rx 2: 3
Treatment Time / Fractionation (Optional- Include Units) Rx 2: 0.36min
Total Dose (Optional-Please Include Units): 2687.5cGy
Daily Fractionated Dose (Optional- Include Units) Rx 2: 268.56cGy

## 2019-10-25 NOTE — PROCEDURE: TREATMENT REGIMEN
Detail Level: Zone
Plan: This patient has been treated today with image guided superficial radiation therapy for non-melanoma skin cancer. Written informed consent has been previously obtained from this patient for this treatment. This consent is documented in the patient’s chart. The patient gave verbal consent to continue treatment today.\\n\\n\\nThe patient was treated with a specific radiation dose and setup as prescribed by the provider listed on this visit note. A Radiation Therapist performed administration of radiation under supervision of provider. The treatment parameters and cumulative dose are indicated above.\\n\\n\\nPrior to administering the radiation, the patient underwent a verification therapeutic radiology simulation-aided field setting defining relevant normal and abnormal target anatomy and acquiring images with high frequency ultrasound in addition to data necessary developing optimal radiation treatment process for the patient. This process includes verification of the treatment port(s) and proper treatment positioning. All treatment ports were photographed within electronic medical record. The patient’s customized lead blocking along with gross tumor volume and margin was confirmed. Considering superficial radiotherapy is clinical in setup, this requires physician and radiation therapist to clarify location interest being treated against initial images, pathology and patient anatomy. Care was taken ensuring fields treated were geometrically accurate and properly positioned using therapeutic radiology simulation-aided field setting verification per fraction. This process is also utilized to determine if any prescription or setup changes are necessary. These steps are therefore medically necessary ensuring safe and effective administration of radiation. Ongoing therapeutic radiology simulation-aided field setting verification is ordered throughout course of therapy.\\n\\nNo US due to location \\n\\nThe field placement and ultrasound imaging, per fraction, is separate and distinct from the initial simulation, and is an important task in providing safe administration of superficial radiation therapy. Physician evaluation of the ultrasound tumor depth will be ongoing throughout the course of treatment and is deemed medically necessary in order to ensure the efficacy of treatment and any necessary changes. Today’s image was evaluated for determination of continuation of treatment with the current plan or with necessary changes as appropriate. \\n\\n\\nAccording to provider review of verification therapeutic radiology simulation-aided field setting and imaging, NO change is required. Additionally, the use of ultrasound visualization and targeted assessment allows the patient to be able to see their cancer(s) progress, encouraging patient to complete and maintain compliance through full course of radiotherapy.\\n\\nPer Dr. Reid, continued ultrasound guidance and therapeutic radiology simulation-aided field setting verification per fraction is required for field placement, measurement of tumor depth, progress and acute effect monitoring. \\n\\n
Plan: This patient has been treated today with image guided superficial radiation therapy for non-melanoma skin cancer. Written informed consent has been previously obtained from this patient for this treatment. This consent is documented in the patient’s chart. The patient gave verbal consent to continue treatment today.\\n\\n\\nThe patient was treated with a specific radiation dose and setup as prescribed by the provider listed on this visit note. A Radiation Therapist performed administration of radiation under supervision of provider. The treatment parameters and cumulative dose are indicated above.\\n\\n\\nPrior to administering the radiation, the patient underwent a verification therapeutic radiology simulation-aided field setting defining relevant normal and abnormal target anatomy and acquiring images with high frequency ultrasound in addition to data necessary developing optimal radiation treatment process for the patient. This process includes verification of the treatment port(s) and proper treatment positioning. All treatment ports were photographed within electronic medical record. The patient’s customized lead blocking along with gross tumor volume and margin was confirmed. Considering superficial radiotherapy is clinical in setup, this requires physician and radiation therapist to clarify location interest being treated against initial images, pathology and patient anatomy. Care was taken ensuring fields treated were geometrically accurate and properly positioned using therapeutic radiology simulation-aided field setting verification per fraction. This process is also utilized to determine if any prescription or setup changes are necessary. These steps are therefore medically necessary ensuring safe and effective administration of radiation. Ongoing therapeutic radiology simulation-aided field setting verification is ordered throughout course of therapy.\\n\\n\\nA high frequency ultrasound image was acquired today for two-dimensional evaluation of the tumor volume and response to treatment, in addition to geometric accuracy of field placement. \\n\\nUS depth is 1.0mm, which is 0.14mm in difference from previous imaging. Repop +++\\n\\nThe field placement and ultrasound imaging, per fraction, is separate and distinct from the initial simulation, and is an important task in providing safe administration of superficial radiation therapy. Physician evaluation of the ultrasound tumor depth will be ongoing throughout the course of treatment and is deemed medically necessary in order to ensure the efficacy of treatment and any necessary changes. Today’s image was evaluated for determination of continuation of treatment with the current plan or with necessary changes as appropriate. \\n\\n\\nAccording to provider review of verification therapeutic radiology simulation-aided field setting and imaging, energy change is required.  Decrease in energy from 70kV to 50kV due to prescription protocol. Additionally, the use of ultrasound visualization and targeted assessment allows the patient to be able to see their cancer(s) progress, encouraging patient to complete and maintain compliance through full course of radiotherapy.\\n\\nPer Dr. Reid, continued ultrasound guidance and therapeutic radiology simulation-aided field setting verification per fraction is required for field placement, measurement of tumor depth, progress and acute effect monitoring.\\n

## 2019-10-28 ENCOUNTER — APPOINTMENT (OUTPATIENT)
Age: 69
Setting detail: DERMATOLOGY
End: 2019-10-30

## 2019-10-28 ENCOUNTER — APPOINTMENT (OUTPATIENT)
Age: 69
Setting detail: DERMATOLOGY
End: 2019-10-29

## 2019-10-28 DIAGNOSIS — D485 NEOPLASM OF UNCERTAIN BEHAVIOR OF SKIN: ICD-10-CM

## 2019-10-28 PROBLEM — C44.719 BASAL CELL CARCINOMA OF SKIN OF LEFT LOWER LIMB, INCLUDING HIP: Status: ACTIVE | Noted: 2019-10-28

## 2019-10-28 PROBLEM — D48.5 NEOPLASM OF UNCERTAIN BEHAVIOR OF SKIN: Status: ACTIVE | Noted: 2019-10-28

## 2019-10-28 PROBLEM — C44.1122 BASAL CELL CARCINOMA OF SKIN OF RIGHT LOWER EYELID, INCLUDING CANTHUS: Status: ACTIVE | Noted: 2019-10-28

## 2019-10-28 PROCEDURE — 77401 RADIATION TX DELIVERY SUPFC: CPT

## 2019-10-28 PROCEDURE — OTHER SUPERFICIAL RADIATION TREATMENT: OTHER

## 2019-10-28 PROCEDURE — G6001 ECHO GUIDANCE RADIOTHERAPY: HCPCS

## 2019-10-28 PROCEDURE — 11102 TANGNTL BX SKIN SINGLE LES: CPT

## 2019-10-28 PROCEDURE — OTHER BIOPSY BY SHAVE METHOD: OTHER

## 2019-10-28 PROCEDURE — 99212 OFFICE O/P EST SF 10 MIN: CPT | Mod: 25

## 2019-10-28 PROCEDURE — 77280 THER RAD SIMULAJ FIELD SMPL: CPT

## 2019-10-28 PROCEDURE — OTHER FOLLOW UP FOR NEXT VISIT: OTHER

## 2019-10-28 PROCEDURE — OTHER MIPS QUALITY: OTHER

## 2019-10-28 PROCEDURE — OTHER TREATMENT REGIMEN: OTHER

## 2019-10-28 ASSESSMENT — LOCATION SIMPLE DESCRIPTION DERM: LOCATION SIMPLE: CHEST

## 2019-10-28 ASSESSMENT — LOCATION ZONE DERM: LOCATION ZONE: TRUNK

## 2019-10-28 ASSESSMENT — LOCATION DETAILED DESCRIPTION DERM: LOCATION DETAILED: RIGHT MEDIAL SUPERIOR CHEST

## 2019-10-28 NOTE — PROCEDURE: BIOPSY BY SHAVE METHOD
Cryotherapy Text: The wound bed was treated with cryotherapy after the biopsy was performed.
Anesthesia Volume In Cc: 0.3
Hemostasis: Drysol
Biopsy Method: Dermablade
Dressing: bandage
Render Post-Care Instructions In Note?: no
Billing Type: Third-Party Bill
Was A Bandage Applied: Yes
Path Notes (To The Dermatopathologist): Stat
Curettage Text: The wound bed was treated with curettage after the biopsy was performed.
Size Of Lesion In Cm: 0.5
Additional Anesthesia Volume In Cc (Will Not Render If 0): 0
Biopsy Type: H and E
Anesthesia Type: 1% lidocaine with epinephrine
Electrodesiccation And Curettage Text: The wound bed was treated with electrodesiccation and curettage after the biopsy was performed.
Detail Level: Detailed
Silver Nitrate Text: The wound bed was treated with silver nitrate after the biopsy was performed.
Wound Care: Petrolatum
Depth Of Biopsy: dermis
Consent: Written consent was obtained and risks were reviewed including but not limited to scarring, infection, bleeding, scabbing, incomplete removal, nerve damage and allergy to anesthesia. Clinical evaluation reveals changes suspicious for rule out provided in path req. A skin biopsy is considered a necessary and appropriate to clarify the diagnosis. A biopsy is performed at the time of visit by technique using using a scalpel blade.
Type Of Destruction Used: Curettage
Notification Instructions: Patient will be notified of biopsy results. However, patient instructed to call the office if not contacted within 2 weeks.
Electrodesiccation Text: The wound bed was treated with electrodesiccation after the biopsy was performed.
Post-Care Instructions: I reviewed with the patient in detail post-care instructions. Patient is to keep the biopsy site dry overnight, and then apply bacitracin twice daily until healed. Patient may apply hydrogen peroxide soaks to remove any crusting.

## 2019-10-28 NOTE — PROCEDURE: TREATMENT REGIMEN
Plan: This patient has been treated today with image guided superficial radiation therapy for non-melanoma skin cancer. Written informed consent has been previously obtained from this patient for this treatment. This consent is documented in the patient’s chart. The patient gave verbal consent to continue treatment today.\\n\\n\\nThe patient was treated with a specific radiation dose and setup as prescribed by the provider listed on this visit note. A Radiation Therapist performed administration of radiation under supervision of provider. The treatment parameters and cumulative dose are indicated above.\\n\\n\\nPrior to administering the radiation, the patient underwent a verification therapeutic radiology simulation-aided field setting defining relevant normal and abnormal target anatomy and acquiring images with high frequency ultrasound in addition to data necessary developing optimal radiation treatment process for the patient. This process includes verification of the treatment port(s) and proper treatment positioning. All treatment ports were photographed within electronic medical record. The patient’s customized lead blocking along with gross tumor volume and margin was confirmed. Considering superficial radiotherapy is clinical in setup, this requires physician and radiation therapist to clarify location interest being treated against initial images, pathology and patient anatomy. Care was taken ensuring fields treated were geometrically accurate and properly positioned using therapeutic radiology simulation-aided field setting verification per fraction. This process is also utilized to determine if any prescription or setup changes are necessary. These steps are therefore medically necessary ensuring safe and effective administration of radiation. Ongoing therapeutic radiology simulation-aided field setting verification is ordered throughout course of therapy.\\n\\nNo US due to location \\n\\nThe field placement and ultrasound imaging, per fraction, is separate and distinct from the initial simulation, and is an important task in providing safe administration of superficial radiation therapy. Physician evaluation of the ultrasound tumor depth will be ongoing throughout the course of treatment and is deemed medically necessary in order to ensure the efficacy of treatment and any necessary changes. Today’s image was evaluated for determination of continuation of treatment with the current plan or with necessary changes as appropriate. \\n\\n\\nAccording to provider review of verification therapeutic radiology simulation-aided field setting and imaging, NO change is required. Additionally, the use of ultrasound visualization and targeted assessment allows the patient to be able to see their cancer(s) progress, encouraging patient to complete and maintain compliance through full course of radiotherapy.\\n\\nPer Dr. Reid, continued ultrasound guidance and therapeutic radiology simulation-aided field setting verification per fraction is required for field placement, measurement of tumor depth, progress and acute effect monitoring. \\n\\n
Detail Level: Zone
Plan: This patient has been treated today with image guided superficial radiation therapy for non-melanoma skin cancer. Written informed consent has been previously obtained from this patient for this treatment. This consent is documented in the patient’s chart. The patient gave verbal consent to continue treatment today.\\n\\n\\nThe patient was treated with a specific radiation dose and setup as prescribed by the provider listed on this visit note. A Radiation Therapist performed administration of radiation under supervision of provider. The treatment parameters and cumulative dose are indicated above.\\n\\n\\nPrior to administering the radiation, the patient underwent a verification therapeutic radiology simulation-aided field setting defining relevant normal and abnormal target anatomy and acquiring images with high frequency ultrasound in addition to data necessary developing optimal radiation treatment process for the patient. This process includes verification of the treatment port(s) and proper treatment positioning. All treatment ports were photographed within electronic medical record. The patient’s customized lead blocking along with gross tumor volume and margin was confirmed. Considering superficial radiotherapy is clinical in setup, this requires physician and radiation therapist to clarify location interest being treated against initial images, pathology and patient anatomy. Care was taken ensuring fields treated were geometrically accurate and properly positioned using therapeutic radiology simulation-aided field setting verification per fraction. This process is also utilized to determine if any prescription or setup changes are necessary. These steps are therefore medically necessary ensuring safe and effective administration of radiation. Ongoing therapeutic radiology simulation-aided field setting verification is ordered throughout course of therapy.\\n\\n\\nA high frequency ultrasound image was acquired today for two-dimensional evaluation of the tumor volume and response to treatment, in addition to geometric accuracy of field placement. \\n\\nUS depth is 1.06mm, which is 0.06mm in difference from previous imaging. \\n\\nThe field placement and ultrasound imaging, per fraction, is separate and distinct from the initial simulation, and is an important task in providing safe administration of superficial radiation therapy. Physician evaluation of the ultrasound tumor depth will be ongoing throughout the course of treatment and is deemed medically necessary in order to ensure the efficacy of treatment and any necessary changes. Today’s image was evaluated for determination of continuation of treatment with the current plan or with necessary changes as appropriate. \\n\\n\\nAccording to provider review of verification therapeutic radiology simulation-aided field setting and imaging, energy change is required.  Decrease in energy from 70kV to 50kV due to prescription protocol. Additionally, the use of ultrasound visualization and targeted assessment allows the patient to be able to see their cancer(s) progress, encouraging patient to complete and maintain compliance through full course of radiotherapy.\\n\\nPer Dr. Reid, continued ultrasound guidance and therapeutic radiology simulation-aided field setting verification per fraction is required for field placement, measurement of tumor depth, progress and acute effect monitoring.\\n

## 2019-10-28 NOTE — PROCEDURE: SUPERFICIAL RADIATION TREATMENT
Treatment Time / Fractionation (Optional- Include Units) Rx 2: min
Total Dose (Optional-Please Include Units): 5394.6cGy
Computed Treatment Time In Min (Will Render The Same As Calculated Treatment Time If Left Blank): 0.36
Total Number Of Fractions Rx 2: 10
Depth (Optional-Please Include Units) Rx 2: 1.35mm
Total Dose (Optional-Please Include Units) Rx 2: 2685.6cGy
Total Dose (Optional-Please Include Units) Rx 2: cGy
Fractions / Week Rx 4: 5
Assessment: Appropriate reaction
Render Patient Eligibility And Selection In Note?: No
Energy (Include Units): 50Kv
Computed Treatment Time In Min (Will Render The Same As Calculated Treatment Time If Left Blank): 0.37
Comments: On Target, RTOG 1
Treatment Time / Fractionation (Optional- Include Units): 0.43min
Treatment Device Design After Initial Simulation Justification (Will Render If Bill For Treatment Devices = Yes): The patient is status post radiation simulation and is evaluated as to the use of additional devices for shielding and placement for radiation therapy.
Field Size (Applicator): 1.5 cm
Total Number Of Fractions Rx 3: 15
Day Of The Week Treatment Administered: Monday
Render Text From Evaluation And Management Tab (Will Not Bill 74423): Yes
Prescription Used: 1
Simple Simulation Preamble Text Will Be Included With Simple Simulations (.......... Indications): Simple simulation was performed today for the following reasons:
Total Number Of Fractions: 20
Functional Status: 0 (fully active)
Patient Positioning: Supine
Treatment Time / Fractionation (Optional- Include Units): 0.37min
Time Dose Fractionation (Optional- Include Units If Applicable): 95
Cumulative Dose In Cgy (Optional): 4045.95
Daily Fractionated Dose (Optional- Include Units) Rx 2: 268.56cGy
Information: Selecting Yes will display possible errors in your note based on the variables you have selected. This validation is only offered as a suggestion for you. PLEASE NOTE THAT THE VALIDATION TEXT WILL BE REMOVED WHEN YOU FINALIZE YOUR NOTE. IF YOU WANT TO FAX A PRELIMINARY NOTE YOU WILL NEED TO TOGGLE THIS TO 'NO' IF YOU DO NOT WANT IT IN YOUR FAXED NOTE.
Dose / Tx In Cgy (Optional): 269.73
Fractions / Week Rx 2: 3
Field Number: 2
Field Size (Applicator): 2.0 cm
Pathology Override (Pathology Will Render As Diagnosis Name If Left Blank): Basal Cell Carcinoma Nodular
Intro Statement (Will Not Render If Left Blank): The patient is undergoing superficial radiation therapy for skin cancer and presents for weekly evaluation and management.  Per protocol and as documented on the flow sheet, the patient was questioned as to subjective redness, pruritus, pain, drainage, fatigue, or any other symptoms.  Objectively, the radiation area was evaluated with regards to erythema, atrophy, scale, crusting, erosion, ulceration, edema, purpura, tenderness, warmth, drainage, and any other findings.  The plan was extensively reviewed including the dose, and dosing schedule.  The simulation and clinical setup was also reviewed as was the external and any internal shields and based on this review the appropriateness and sufficiency of treatment was determined.
Custom Shielding Preamble Text Will Not Be Included With Simple Simulations (.......... X X Y Cm): A lead shield of 0.762 mm thickness is utilized to form a molded, custom shield with a
Detail Level: Detailed
Time Dose Fractionation (Optional- Include Units If Applicable): 47
Dose Per Fractionation In Cgy (Optional): 268.56
Simple Simulation Afterword Text Will Be Included With Simple Simulations (Indications............): The patient had a complete consultation regarding all applicable modalities for the treatment of their skin cancer and based on a variety of factors including the type of tumor, size, and location, the relevant medical history as well as local tissue factors, the functional status of the individual, the ability to perform necessary postoperative wound instructions and the need for simultaneous treatments as well as overall wound healing status, it was determined that the patient would begin radiation therapy treatment for skin cancer.  A full simulation and treatment device design was performed including the determination and formulation of appropriate simple and complex devices including lead shield of 0.762 mm thickness to form molded customized shielding to specifically correlate with the lesion size including treatment margin.  The custom lead shield is adequate to accommodate the appropriate applicator and provide adequate shielding around the treatment site.  The specific field applicator, shields, and devices both simple and complex as well as the specific patient setup is outlined below.  The patient was given a full consent for superficial radiation to both verbally and in writing and the full determination of patient's eligibility for treatment and selection is outlined on the patient eligibility and treatment selection form.  The specific superficial radiotherapy prescription was determined and was documented on the superficial radiotherapy prescription form.  A treatment calculation was also performed and documented on the treatment calculation form.  Based on the prescription, the patient was scheduled for a series of fractional treatments.
Cumulative Dose In Cgy (Optional): 4030.3
Treatment Margins In Cm: 0.5
Treatment Time / Fractionation (Optional- Include Units) Rx 2: 0.36min
Total Dose (Optional-Please Include Units): 2687.5cGy
Custom Shielding Afterword Text Will Not Be Included With Simple Simulations (X X Y Cm............): port to correlate with the lesion size, including treatment margin. The custom lead shield is adequate to accommodate the appropriate applicator and provide adequate shielding around the treatment site. Additional shielding (as noted below) is used to protect sensitive, normal tissues.
Daily Fractionated Dose (Optional- Include Units): 269.75cGy
Energy (Optional-Please Include Units): 70kV
Shielding Size (Optional- Include Units) Rx 2: 1.5cm x 1.5cm
Shielding Size (Optional- Include Units): 1.5cm x1.5cm
Pathology Override (Pathology Will Render As Diagnosis Name If Left Blank): Superficial Basal Cell Carcinoma
Daily Fractionated Dose (Optional- Include Units): 268.75cGy

## 2019-11-06 ENCOUNTER — APPOINTMENT (OUTPATIENT)
Age: 69
Setting detail: DERMATOLOGY
End: 2019-11-07

## 2019-11-06 PROBLEM — C44.719 BASAL CELL CARCINOMA OF SKIN OF LEFT LOWER LIMB, INCLUDING HIP: Status: ACTIVE | Noted: 2019-11-06

## 2019-11-06 PROBLEM — C44.1122 BASAL CELL CARCINOMA OF SKIN OF RIGHT LOWER EYELID, INCLUDING CANTHUS: Status: ACTIVE | Noted: 2019-11-06

## 2019-11-06 PROCEDURE — 77300 RADIATION THERAPY DOSE PLAN: CPT

## 2019-11-06 PROCEDURE — 77401 RADIATION TX DELIVERY SUPFC: CPT

## 2019-11-06 PROCEDURE — OTHER TREATMENT REGIMEN: OTHER

## 2019-11-06 PROCEDURE — 77280 THER RAD SIMULAJ FIELD SMPL: CPT

## 2019-11-06 PROCEDURE — OTHER SUPERFICIAL RADIATION TREATMENT: OTHER

## 2019-11-06 PROCEDURE — G6001 ECHO GUIDANCE RADIOTHERAPY: HCPCS

## 2019-11-06 PROCEDURE — OTHER FOLLOW UP FOR NEXT VISIT: OTHER

## 2019-11-06 NOTE — PROCEDURE: SUPERFICIAL RADIATION TREATMENT
Energy (Optional-Please Include Units): 50kV
Field Size (Applicator): 1.5 cm
Date Treatment Resumed: 11/06/2019
Simple Simulation Afterword Text Will Be Included With Simple Simulations (Indications............): The patient had a complete consultation regarding all applicable modalities for the treatment of their skin cancer and based on a variety of factors including the type of tumor, size, and location, the relevant medical history as well as local tissue factors, the functional status of the individual, the ability to perform necessary postoperative wound instructions and the need for simultaneous treatments as well as overall wound healing status, it was determined that the patient would begin radiation therapy treatment for skin cancer.  A full simulation and treatment device design was performed including the determination and formulation of appropriate simple and complex devices including lead shield of 0.762 mm thickness to form molded customized shielding to specifically correlate with the lesion size including treatment margin.  The custom lead shield is adequate to accommodate the appropriate applicator and provide adequate shielding around the treatment site.  The specific field applicator, shields, and devices both simple and complex as well as the specific patient setup is outlined below.  The patient was given a full consent for superficial radiation to both verbally and in writing and the full determination of patient's eligibility for treatment and selection is outlined on the patient eligibility and treatment selection form.  The specific superficial radiotherapy prescription was determined and was documented on the superficial radiotherapy prescription form.  A treatment calculation was also performed and documented on the treatment calculation form.  Based on the prescription, the patient was scheduled for a series of fractional treatments.
Treatment Time In Min (Optional): 0.36
Decay Factor: 0.98
Daily Fractionated Dose (Optional- Include Units): 268.75cGy
Fractionation Number: 16
Energy (Optional-Please Include Units): 70kV
Bill For Dosimetry/Render Treatment Time Calculation In Note: No
Date Treatment Started: 09/23/2019
Total Dose (Optional-Please Include Units): 5394.6cGy
Original Tdf: 94
Time Dose Fractionation (Optional- Include Units If Applicable): 47
Information: Selecting Yes will display possible errors in your note based on the variables you have selected. This validation is only offered as a suggestion for you. PLEASE NOTE THAT THE VALIDATION TEXT WILL BE REMOVED WHEN YOU FINALIZE YOUR NOTE. IF YOU WANT TO FAX A PRELIMINARY NOTE YOU WILL NEED TO TOGGLE THIS TO 'NO' IF YOU DO NOT WANT IT IN YOUR FAXED NOTE.
Field Number: 2
Functional Status: 0 (fully active)
Original Tdf: 95
Simple Simulation Preamble Text Will Be Included With Simple Simulations (.......... Indications): Simple simulation was performed today for the following reasons:
Intro Statement (Will Not Render If Left Blank): The patient is undergoing superficial radiation therapy for skin cancer and presents for weekly evaluation and management.  Per protocol and as documented on the flow sheet, the patient was questioned as to subjective redness, pruritus, pain, drainage, fatigue, or any other symptoms.  Objectively, the radiation area was evaluated with regards to erythema, atrophy, scale, crusting, erosion, ulceration, edema, purpura, tenderness, warmth, drainage, and any other findings.  The plan was extensively reviewed including the dose, and dosing schedule.  The simulation and clinical setup was also reviewed as was the external and any internal shields and based on this review the appropriateness and sufficiency of treatment was determined.
Custom Shielding Afterword Text Will Not Be Included With Simple Simulations (X X Y Cm............): port to correlate with the lesion size, including treatment margin. The custom lead shield is adequate to accommodate the appropriate applicator and provide adequate shielding around the treatment site. Additional shielding (as noted below) is used to protect sensitive, normal tissues.
Shielding Size (Optional- Include Units) Rx 2: 1.5cm x 1.5cm
Cumulative Dose In Cgy (Optional): 4298.86
Treatment Time / Fractionation (Optional- Include Units) Rx 2: min
Number Of Treatment Days: 36
Field Size (Applicator) Rx 2: 2.0 cm
Depth (Optional-Please Include Units): 1.35mm
Reason For Treatment Interruption: Trip
Bill For Dosimetry/Render Decay And Dose Adjustment Calculation In Note: Yes
Date Interruption Started: 10/29/2019
Treatment Device Design After Initial Simulation Justification (Will Render If Bill For Treatment Devices = Yes): The patient is status post radiation simulation and is evaluated as to the use of additional devices for shielding and placement for radiation therapy.
Comments: On Target, RTOG 1
Prescription Used: 1
Fractions / Week Rx 3: 5
Total Number Of Fractions Rx 3: 15
Dose Per Fractionation In Cgy (Optional): 268.56
Treatment Time In Min (Optional): 0.37
Custom Shielding Preamble Text Will Not Be Included With Simple Simulations (.......... X X Y Cm): A lead shield of 0.762 mm thickness is utilized to form a molded, custom shield with a
Assessment: Appropriate reaction
Treatment Margins In Cm: 0.5
Date Interruption Stopped: 11/05/2019
Total Dose (Optional-Please Include Units) Rx 2: cGy
Number Of Days Off Treatment: 8
Daily Fractionated Dose (Optional- Include Units): 269.75cGy
Dose Per Fractionation In Cgy (Optional): 269.73
Fractions / Week: 3
Total Number Of Fractions Rx 2: 10
Detail Level: Detailed
Pathology Override (Pathology Will Render As Diagnosis Name If Left Blank): Basal Cell Carcinoma Nodular
Treatment Time / Fractionation (Optional- Include Units) Rx 2: 0.36min
Tdf Of Fractionations Delivered: 71
Day Of The Week Treatment Administered: Wednesday
Patient Positioning: Supine
Total Dose (Optional-Please Include Units): 2687.5cGy
Pathology Override (Pathology Will Render As Diagnosis Name If Left Blank): Superficial Basal Cell Carcinoma
Treatment Time / Fractionation (Optional- Include Units): 0.43min
Date Treatment Stopped: 10/28/2019
Total Number Of Fractions: 20
Total Dose (Optional-Please Include Units) Rx 2: 2685.6cGy
Cumulative Dose In Cgy (Optional): 4315.68
Treatment Time / Fractionation (Optional- Include Units): 0.37min
Shielding Size (Optional- Include Units): 1.5cm x1.5cm
Daily Fractionated Dose (Optional- Include Units) Rx 2: 268.56cGy

## 2019-11-06 NOTE — PROCEDURE: TREATMENT REGIMEN
Plan: This patient has been treated today with image guided superficial radiation therapy for non-melanoma skin cancer. Written informed consent has been previously obtained from this patient for this treatment. This consent is documented in the patient’s chart. The patient gave verbal consent to continue treatment today.\\n\\n\\nThe patient was treated with a specific radiation dose and setup as prescribed by the provider listed on this visit note. A Radiation Therapist performed administration of radiation under supervision of provider. The treatment parameters and cumulative dose are indicated above.\\n\\n\\nPrior to administering the radiation, the patient underwent a verification therapeutic radiology simulation-aided field setting defining relevant normal and abnormal target anatomy and acquiring images with high frequency ultrasound in addition to data necessary developing optimal radiation treatment process for the patient. This process includes verification of the treatment port(s) and proper treatment positioning. All treatment ports were photographed within electronic medical record. The patient’s customized lead blocking along with gross tumor volume and margin was confirmed. Considering superficial radiotherapy is clinical in setup, this requires physician and radiation therapist to clarify location interest being treated against initial images, pathology and patient anatomy. Care was taken ensuring fields treated were geometrically accurate and properly positioned using therapeutic radiology simulation-aided field setting verification per fraction. This process is also utilized to determine if any prescription or setup changes are necessary. These steps are therefore medically necessary ensuring safe and effective administration of radiation. Ongoing therapeutic radiology simulation-aided field setting verification is ordered throughout course of therapy.\\n\\n\\nA high frequency ultrasound image was acquired today for two-dimensional evaluation of the tumor volume and response to treatment, in addition to geometric accuracy of field placement. \\n\\nUS depth is 1.37mm (edema), which is 0.31mm in difference from previous imaging. \\n\\nThe field placement and ultrasound imaging, per fraction, is separate and distinct from the initial simulation, and is an important task in providing safe administration of superficial radiation therapy. Physician evaluation of the ultrasound tumor depth will be ongoing throughout the course of treatment and is deemed medically necessary in order to ensure the efficacy of treatment and any necessary changes. Today’s image was evaluated for determination of continuation of treatment with the current plan or with necessary changes as appropriate. \\n\\n\\nAccording to provider review of verification therapeutic radiology simulation-aided field setting and imaging, energy change is required.  Decrease in energy from 70kV to 50kV due to prescription protocol. Additionally, the use of ultrasound visualization and targeted assessment allows the patient to be able to see their cancer(s) progress, encouraging patient to complete and maintain compliance through full course of radiotherapy.\\n\\nPer Dr. Reid, continued ultrasound guidance and therapeutic radiology simulation-aided field setting verification per fraction is required for field placement, measurement of tumor depth, progress and acute effect monitoring.\\n

## 2019-11-06 NOTE — PROCEDURE: TREATMENT REGIMEN
Plan: This patient has been treated today with image guided superficial radiation therapy for non-melanoma\\nskin cancer.\\n\\nWritten informed consent has been previously obtained from this patient for this treatment. This\\nconsent is documented in the patient’s chart. The patient gave verbal consent to continue treatment\\ntoday.\\n\\nThe patient was treated with a specific radiation dose and setup as prescribed by the provider listed on\\nthis visit note. A Radiation Therapist performed administration of radiation under supervision of\\nprovider. The treatment parameters and cumulative dose are indicated above.\\n\\nPrior to administering the radiation, the patient underwent a verification therapeutic radiology\\nsimulation-aided field setting, defining relevant normal and abnormal target, in addition to data necessary developing optimal radiation treatment process for the patient. This process includes verification of the treatment port(s) and proper treatment positioning. All treatment ports were photographed within electronic medical record. The\\npatient’s customized lead blocking along with gross tumor volume and margin was\\nconfirmed. Considering superficial radiotherapy is clinical in setup, this requires physician and\\nradiation therapist to clarify location interest being treated against initial images, pathology and\\npatient anatomy. Care was taken ensuring fields treated were geometrically accurate and properly\\npositioned using therapeutic radiology simulation-aided field setting verification per fraction. This\\nprocess is also utilized to determine if any prescription or setup changes are necessary. These steps are\\ntherefore medically necessary ensuring safe and effective administration of radiation. Ongoing\\ntherapeutic radiology simulation-aided field setting verification is ordered throughout course of\\ntherapy.\\n\\nThe field placement, per fraction, is separate and distinct from the initial simulation, and is an\\nimportant task in providing safe administration of superficial radiation therapy. \\nAccording to provider review of verification therapeutic radiology simulation-aided field setting, no changes are required. \\nPer Dr. Reid, continued therapeutic radiology simulation-aided field setting verification per fraction is required for field placement, progress and acute effect monitoring.\\n\\n

## 2019-11-06 NOTE — PROCEDURE: TREATMENT REGIMEN
Modify Regimen: Per the request of Dr Reid dose decay calculation for patient for 8 days of no treatment delivery due to a trip. Dose decay calculation determines a decay factor of 0.98. TDF of delivered fractions prior to break in treatment is 71. TDF of delivered fractions with dose decay calculated is 69.58. TDF of remaining fractions is 28. Overall, TDF for entire course of treatment after calculation is 97.58. The patient will be treated for a total of 21 fractions. Dose decay calculation is clinically necessary to determine appropriate changes to prescription, if necessary, for efficacy of treatment.\\n\\n

## 2019-11-11 ENCOUNTER — APPOINTMENT (OUTPATIENT)
Age: 69
Setting detail: DERMATOLOGY
End: 2019-11-12

## 2019-11-11 PROBLEM — C44.719 BASAL CELL CARCINOMA OF SKIN OF LEFT LOWER LIMB, INCLUDING HIP: Status: ACTIVE | Noted: 2019-11-11

## 2019-11-11 PROBLEM — C44.1122 BASAL CELL CARCINOMA OF SKIN OF RIGHT LOWER EYELID, INCLUDING CANTHUS: Status: ACTIVE | Noted: 2019-11-11

## 2019-11-11 PROCEDURE — OTHER TREATMENT REGIMEN: OTHER

## 2019-11-11 PROCEDURE — 77280 THER RAD SIMULAJ FIELD SMPL: CPT

## 2019-11-11 PROCEDURE — G6001 ECHO GUIDANCE RADIOTHERAPY: HCPCS

## 2019-11-11 PROCEDURE — 77401 RADIATION TX DELIVERY SUPFC: CPT

## 2019-11-11 PROCEDURE — OTHER FOLLOW UP FOR NEXT VISIT: OTHER

## 2019-11-11 PROCEDURE — OTHER SUPERFICIAL RADIATION TREATMENT: OTHER

## 2019-11-11 NOTE — PROCEDURE: SUPERFICIAL RADIATION TREATMENT
Day Of The Week Treatment Administered: Monday
Tdf Of Fractionations Delivered: 71
Custom Shielding Preamble Text Will Not Be Included With Simple Simulations (.......... X X Y Cm): A lead shield of 0.762 mm thickness is utilized to form a molded, custom shield with a
Fractions / Week Rx 3: 5
Dimensions-X Axis In Cm: 1
Dose Per Fractionation In Cgy (Optional): 265.29
Date Treatment Started: 09/23/2019
Simple Simulation Afterword Text Will Be Included With Simple Simulations (Indications............): The patient had a complete consultation regarding all applicable modalities for the treatment of their skin cancer and based on a variety of factors including the type of tumor, size, and location, the relevant medical history as well as local tissue factors, the functional status of the individual, the ability to perform necessary postoperative wound instructions and the need for simultaneous treatments as well as overall wound healing status, it was determined that the patient would begin radiation therapy treatment for skin cancer.  A full simulation and treatment device design was performed including the determination and formulation of appropriate simple and complex devices including lead shield of 0.762 mm thickness to form molded customized shielding to specifically correlate with the lesion size including treatment margin.  The custom lead shield is adequate to accommodate the appropriate applicator and provide adequate shielding around the treatment site.  The specific field applicator, shields, and devices both simple and complex as well as the specific patient setup is outlined below.  The patient was given a full consent for superficial radiation to both verbally and in writing and the full determination of patient's eligibility for treatment and selection is outlined on the patient eligibility and treatment selection form.  The specific superficial radiotherapy prescription was determined and was documented on the superficial radiotherapy prescription form.  A treatment calculation was also performed and documented on the treatment calculation form.  Based on the prescription, the patient was scheduled for a series of fractional treatments.
Simple Simulation Preamble Text Will Be Included With Simple Simulations (.......... Indications): Simple simulation was performed today for the following reasons:
Validate Note Data (See Information Below): No
Total Dose (Optional-Please Include Units) Rx 2: 2685.6cGy
Field Size (Applicator) Rx 2: 2.0 cm
Treatment Time In Min (Optional): 0.36
Energy (Optional-Please Include Units) Rx 2: 50Kv
Treatment Margins In Cm: 0.5
Total Number Of Fractions Rx 2: 10
Field Size (Applicator): 1.5 cm
Fractions / Week Rx 2: 3
Total Number Of Fractions Rx 4: 15
Total Dose (Optional-Please Include Units): 2687.5cGy
Date Treatment Resumed: 11/06/2019
Pathology Override (Pathology Will Render As Diagnosis Name If Left Blank): Superficial Basal Cell Carcinoma
Reason For Treatment Interruption: Trip
Fractionation Number: 17
Decay Factor: 0.98
Include Rx 2 When Rendering Additional Prescriptions: Yes
Total Number Of Fractions: 20
Treatment Device Design After Initial Simulation Justification (Will Render If Bill For Treatment Devices = Yes): The patient is status post radiation simulation and is evaluated as to the use of additional devices for shielding and placement for radiation therapy.
Dose / Tx In Cgy (Optional): 267.9
Daily Fractionated Dose (Optional- Include Units): 268.75cGy
Depth (Optional-Please Include Units) Rx 2: 1.35mm
Cumulative Dose In Cgy (Optional): 4583.58
Computed Treatment Time In Min (Will Render The Same As Calculated Treatment Time If Left Blank): 0.37
Custom Shielding Afterword Text Will Not Be Included With Simple Simulations (X X Y Cm............): port to correlate with the lesion size, including treatment margin. The custom lead shield is adequate to accommodate the appropriate applicator and provide adequate shielding around the treatment site. Additional shielding (as noted below) is used to protect sensitive, normal tissues.
Date Interruption Stopped: 11/05/2019
Treatment Time / Fractionation (Optional- Include Units) Rx 2: 0.36min
Energy (Optional-Please Include Units): 70kV
Cumulative Dose In Cgy (Optional): 4564.15
Time Dose Fractionation (Optional- Include Units If Applicable): 47
Original Tdf: 94
Detail Level: Detailed
Functional Status: 0 (fully active)
Comments: On Target, RTOG 1
Daily Fractionated Dose (Optional- Include Units) Rx 2: cGy
Information: Selecting Yes will display possible errors in your note based on the variables you have selected. This validation is only offered as a suggestion for you. PLEASE NOTE THAT THE VALIDATION TEXT WILL BE REMOVED WHEN YOU FINALIZE YOUR NOTE. IF YOU WANT TO FAX A PRELIMINARY NOTE YOU WILL NEED TO TOGGLE THIS TO 'NO' IF YOU DO NOT WANT IT IN YOUR FAXED NOTE.
Intro Statement (Will Not Render If Left Blank): The patient is undergoing superficial radiation therapy for skin cancer and presents for weekly evaluation and management.  Per protocol and as documented on the flow sheet, the patient was questioned as to subjective redness, pruritus, pain, drainage, fatigue, or any other symptoms.  Objectively, the radiation area was evaluated with regards to erythema, atrophy, scale, crusting, erosion, ulceration, edema, purpura, tenderness, warmth, drainage, and any other findings.  The plan was extensively reviewed including the dose, and dosing schedule.  The simulation and clinical setup was also reviewed as was the external and any internal shields and based on this review the appropriateness and sufficiency of treatment was determined.
Treatment Time / Fractionation (Optional- Include Units) Rx 2: min
Date Interruption Started: 10/29/2019
Computed Treatment Time In Min (Will Render The Same As Calculated Treatment Time If Left Blank): 0.38
Patient Positioning: Supine
Date Treatment Stopped: 10/28/2019
Treatment Time / Fractionation (Optional- Include Units): 0.43min
Shielding Size (Optional- Include Units) Rx 2: 1.5cm x 1.5cm
Treatment Time / Fractionation (Optional- Include Units): 0.37min
Number Of Treatment Days: 36
Pathology Override (Pathology Will Render As Diagnosis Name If Left Blank): Basal Cell Carcinoma Nodular
Original Tdf: 95
Assessment: Appropriate reaction
Daily Fractionated Dose (Optional- Include Units): 269.75cGy
Total Dose (Optional-Please Include Units): 5394.6cGy
Daily Fractionated Dose (Optional- Include Units) Rx 2: 268.56cGy
Shielding Size (Optional- Include Units): 1.5cm x1.5cm
Number Of Days Off Treatment: 8
Field Number: 2

## 2019-11-11 NOTE — PROCEDURE: TREATMENT REGIMEN
Plan: This patient has been treated today with image guided superficial radiation therapy for non-melanoma skin cancer. Written informed consent has been previously obtained from this patient for this treatment. This consent is documented in the patient’s chart. The patient gave verbal consent to continue treatment today.\\n\\n\\nThe patient was treated with a specific radiation dose and setup as prescribed by the provider listed on this visit note. A Radiation Therapist performed administration of radiation under supervision of provider. The treatment parameters and cumulative dose are indicated above.\\n\\n\\nPrior to administering the radiation, the patient underwent a verification therapeutic radiology simulation-aided field setting defining relevant normal and abnormal target anatomy and acquiring images with high frequency ultrasound in addition to data necessary developing optimal radiation treatment process for the patient. This process includes verification of the treatment port(s) and proper treatment positioning. All treatment ports were photographed within electronic medical record. The patient’s customized lead blocking along with gross tumor volume and margin was confirmed. Considering superficial radiotherapy is clinical in setup, this requires physician and radiation therapist to clarify location interest being treated against initial images, pathology and patient anatomy. Care was taken ensuring fields treated were geometrically accurate and properly positioned using therapeutic radiology simulation-aided field setting verification per fraction. This process is also utilized to determine if any prescription or setup changes are necessary. These steps are therefore medically necessary ensuring safe and effective administration of radiation. Ongoing therapeutic radiology simulation-aided field setting verification is ordered throughout course of therapy.\\n\\nThe field placement and ultrasound imaging, per fraction, is separate and distinct from the initial simulation, and is an important task in providing safe administration of superficial radiation therapy. Physician evaluation of the ultrasound tumor depth will be ongoing throughout the course of treatment and is deemed medically necessary in order to ensure the efficacy of treatment and any necessary changes. Today’s image was evaluated for determination of continuation of treatment with the current plan or with necessary changes as appropriate. \\n\\n\\nAccording to provider review of verification therapeutic radiology simulation-aided field setting and imaging, NO change is required. Additionally, the use of ultrasound visualization and targeted assessment allows the patient to be able to see their cancer(s) progress, encouraging patient to complete and maintain compliance through full course of radiotherapy.\\n\\nPer Dr. Reid, continued ultrasound guidance and therapeutic radiology simulation-aided field setting verification per fraction is required for field placement, measurement of tumor depth, progress and acute effect monitoring. \\n\\n
Plan: This patient has been treated today with image guided superficial radiation therapy for non-melanoma skin cancer. Written informed consent has been previously obtained from this patient for this treatment. This consent is documented in the patient’s chart. The patient gave verbal consent to continue treatment today.\\n\\n\\nThe patient was treated with a specific radiation dose and setup as prescribed by the provider listed on this visit note. A Radiation Therapist performed administration of radiation under supervision of provider. The treatment parameters and cumulative dose are indicated above.\\n\\n\\nPrior to administering the radiation, the patient underwent a verification therapeutic radiology simulation-aided field setting defining relevant normal and abnormal target anatomy and acquiring images with high frequency ultrasound in addition to data necessary developing optimal radiation treatment process for the patient. This process includes verification of the treatment port(s) and proper treatment positioning. All treatment ports were photographed within electronic medical record. The patient’s customized lead blocking along with gross tumor volume and margin was confirmed. Considering superficial radiotherapy is clinical in setup, this requires physician and radiation therapist to clarify location interest being treated against initial images, pathology and patient anatomy. Care was taken ensuring fields treated were geometrically accurate and properly positioned using therapeutic radiology simulation-aided field setting verification per fraction. This process is also utilized to determine if any prescription or setup changes are necessary. These steps are therefore medically necessary ensuring safe and effective administration of radiation. Ongoing therapeutic radiology simulation-aided field setting verification is ordered throughout course of therapy.\\n\\n\\nA high frequency ultrasound image was acquired today for two-dimensional evaluation of the tumor volume and response to treatment, in addition to geometric accuracy of field placement. \\n\\nUS depth is 1.05mm (edema), which is 0.32mm in difference from previous imaging. Repop +++\\n\\nThe field placement and ultrasound imaging, per fraction, is separate and distinct from the initial simulation, and is an important task in providing safe administration of superficial radiation therapy. Physician evaluation of the ultrasound tumor depth will be ongoing throughout the course of treatment and is deemed medically necessary in order to ensure the efficacy of treatment and any necessary changes. Today’s image was evaluated for determination of continuation of treatment with the current plan or with necessary changes as appropriate. \\n\\n\\nAccording to provider review of verification therapeutic radiology simulation-aided field setting and imaging, energy change is required.  Decrease in energy from 70kV to 50kV due to prescription protocol. Additionally, the use of ultrasound visualization and targeted assessment allows the patient to be able to see their cancer(s) progress, encouraging patient to complete and maintain compliance through full course of radiotherapy.\\n\\nPer Dr. Reid, continued ultrasound guidance and therapeutic radiology simulation-aided field setting verification per fraction is required for field placement, measurement of tumor depth, progress and acute effect monitoring.\\n
Modify Regimen: Dose calc due to physics calibration done on 11/9/19
Modify Regimen: Dose calc due to physics calibration done on 11/9/19\\n\\n
Detail Level: Zone

## 2019-11-13 ENCOUNTER — APPOINTMENT (OUTPATIENT)
Age: 69
Setting detail: DERMATOLOGY
End: 2019-11-13

## 2019-11-13 PROBLEM — C44.1122 BASAL CELL CARCINOMA OF SKIN OF RIGHT LOWER EYELID, INCLUDING CANTHUS: Status: ACTIVE | Noted: 2019-11-13

## 2019-11-13 PROBLEM — C44.719 BASAL CELL CARCINOMA OF SKIN OF LEFT LOWER LIMB, INCLUDING HIP: Status: ACTIVE | Noted: 2019-11-13

## 2019-11-13 PROCEDURE — G6001 ECHO GUIDANCE RADIOTHERAPY: HCPCS

## 2019-11-13 PROCEDURE — OTHER SUPERFICIAL RADIATION TREATMENT: OTHER

## 2019-11-13 PROCEDURE — OTHER FOLLOW UP FOR NEXT VISIT: OTHER

## 2019-11-13 PROCEDURE — 77280 THER RAD SIMULAJ FIELD SMPL: CPT

## 2019-11-13 PROCEDURE — 77401 RADIATION TX DELIVERY SUPFC: CPT

## 2019-11-13 PROCEDURE — OTHER TREATMENT REGIMEN: OTHER

## 2019-11-13 NOTE — PROCEDURE: TREATMENT REGIMEN
Modify Regimen: \\n\\n
Detail Level: Zone
Plan: This patient has been treated today with image guided superficial radiation therapy for non-melanoma skin cancer. Written informed consent has been previously obtained from this patient for this treatment. This consent is documented in the patient’s chart. The patient gave verbal consent to continue treatment today.\\n\\n\\nThe patient was treated with a specific radiation dose and setup as prescribed by the provider listed on this visit note. A Radiation Therapist performed administration of radiation under supervision of provider. The treatment parameters and cumulative dose are indicated above.\\n\\n\\nPrior to administering the radiation, the patient underwent a verification therapeutic radiology simulation-aided field setting defining relevant normal and abnormal target anatomy and acquiring images with high frequency ultrasound in addition to data necessary developing optimal radiation treatment process for the patient. This process includes verification of the treatment port(s) and proper treatment positioning. All treatment ports were photographed within electronic medical record. The patient’s customized lead blocking along with gross tumor volume and margin was confirmed. Considering superficial radiotherapy is clinical in setup, this requires physician and radiation therapist to clarify location interest being treated against initial images, pathology and patient anatomy. Care was taken ensuring fields treated were geometrically accurate and properly positioned using therapeutic radiology simulation-aided field setting verification per fraction. This process is also utilized to determine if any prescription or setup changes are necessary. These steps are therefore medically necessary ensuring safe and effective administration of radiation. Ongoing therapeutic radiology simulation-aided field setting verification is ordered throughout course of therapy.\\n\\n\\nA high frequency ultrasound image was acquired today for two-dimensional evaluation of the tumor volume and response to treatment, in addition to geometric accuracy of field placement. \\n\\nUS depth is 1.04mm (edema), which is 0.01mm in difference from previous imaging. Repop +++\\n\\nThe field placement and ultrasound imaging, per fraction, is separate and distinct from the initial simulation, and is an important task in providing safe administration of superficial radiation therapy. Physician evaluation of the ultrasound tumor depth will be ongoing throughout the course of treatment and is deemed medically necessary in order to ensure the efficacy of treatment and any necessary changes. Today’s image was evaluated for determination of continuation of treatment with the current plan or with necessary changes as appropriate. \\n\\n\\nAccording to provider review of verification therapeutic radiology simulation-aided field setting and imaging, energy change is required.  Decrease in energy from 70kV to 50kV due to prescription protocol. Additionally, the use of ultrasound visualization and targeted assessment allows the patient to be able to see their cancer(s) progress, encouraging patient to complete and maintain compliance through full course of radiotherapy.\\n\\nPer Dr. Reid, continued ultrasound guidance and therapeutic radiology simulation-aided field setting verification per fraction is required for field placement, measurement of tumor depth, progress and acute effect monitoring.\\n
Plan: This patient has been treated today with image guided superficial radiation therapy for non-melanoma skin cancer. Written informed consent has been previously obtained from this patient for this treatment. This consent is documented in the patient’s chart. The patient gave verbal consent to continue treatment today.\\n\\n\\nThe patient was treated with a specific radiation dose and setup as prescribed by the provider listed on this visit note. A Radiation Therapist performed administration of radiation under supervision of provider. The treatment parameters and cumulative dose are indicated above.\\n\\n\\nPrior to administering the radiation, the patient underwent a verification therapeutic radiology simulation-aided field setting defining relevant normal and abnormal target anatomy and acquiring images with high frequency ultrasound in addition to data necessary developing optimal radiation treatment process for the patient. This process includes verification of the treatment port(s) and proper treatment positioning. All treatment ports were photographed within electronic medical record. The patient’s customized lead blocking along with gross tumor volume and margin was confirmed. Considering superficial radiotherapy is clinical in setup, this requires physician and radiation therapist to clarify location interest being treated against initial images, pathology and patient anatomy. Care was taken ensuring fields treated were geometrically accurate and properly positioned using therapeutic radiology simulation-aided field setting verification per fraction. This process is also utilized to determine if any prescription or setup changes are necessary. These steps are therefore medically necessary ensuring safe and effective administration of radiation. Ongoing therapeutic radiology simulation-aided field setting verification is ordered throughout course of therapy.\\n\\nThe field placement and ultrasound imaging, per fraction, is separate and distinct from the initial simulation, and is an important task in providing safe administration of superficial radiation therapy. Physician evaluation of the ultrasound tumor depth will be ongoing throughout the course of treatment and is deemed medically necessary in order to ensure the efficacy of treatment and any necessary changes. Today’s image was evaluated for determination of continuation of treatment with the current plan or with necessary changes as appropriate. \\n\\n\\nAccording to provider review of verification therapeutic radiology simulation-aided field setting and imaging, NO change is required. Additionally, the use of ultrasound visualization and targeted assessment allows the patient to be able to see their cancer(s) progress, encouraging patient to complete and maintain compliance through full course of radiotherapy.\\n\\nPer Dr. Reid, continued ultrasound guidance and therapeutic radiology simulation-aided field setting verification per fraction is required for field placement, measurement of tumor depth, progress and acute effect monitoring. \\n\\n

## 2019-11-13 NOTE — PROCEDURE: SUPERFICIAL RADIATION TREATMENT
Include Rx 3 When Rendering Additional Prescriptions: No
Treatment Time / Fractionation (Optional- Include Units) Rx 2: min
Daily Fractionated Dose (Optional- Include Units) Rx 2: cGy
Custom Shielding Afterword Text Will Not Be Included With Simple Simulations (X X Y Cm............): port to correlate with the lesion size, including treatment margin. The custom lead shield is adequate to accommodate the appropriate applicator and provide adequate shielding around the treatment site. Additional shielding (as noted below) is used to protect sensitive, normal tissues.
Treatment Time In Min (Optional): 0.38
Fractions / Week: 3
Treatment Time In Min (Optional): 0.36
Field Size (Applicator): 2.0 cm
Information: Selecting Yes will display possible errors in your note based on the variables you have selected. This validation is only offered as a suggestion for you. PLEASE NOTE THAT THE VALIDATION TEXT WILL BE REMOVED WHEN YOU FINALIZE YOUR NOTE. IF YOU WANT TO FAX A PRELIMINARY NOTE YOU WILL NEED TO TOGGLE THIS TO 'NO' IF YOU DO NOT WANT IT IN YOUR FAXED NOTE.
Original Tdf: 94
Fractionation Number: 18
Total Number Of Fractions: 20
Simple Simulation Afterword Text Will Be Included With Simple Simulations (Indications............): The patient had a complete consultation regarding all applicable modalities for the treatment of their skin cancer and based on a variety of factors including the type of tumor, size, and location, the relevant medical history as well as local tissue factors, the functional status of the individual, the ability to perform necessary postoperative wound instructions and the need for simultaneous treatments as well as overall wound healing status, it was determined that the patient would begin radiation therapy treatment for skin cancer.  A full simulation and treatment device design was performed including the determination and formulation of appropriate simple and complex devices including lead shield of 0.762 mm thickness to form molded customized shielding to specifically correlate with the lesion size including treatment margin.  The custom lead shield is adequate to accommodate the appropriate applicator and provide adequate shielding around the treatment site.  The specific field applicator, shields, and devices both simple and complex as well as the specific patient setup is outlined below.  The patient was given a full consent for superficial radiation to both verbally and in writing and the full determination of patient's eligibility for treatment and selection is outlined on the patient eligibility and treatment selection form.  The specific superficial radiotherapy prescription was determined and was documented on the superficial radiotherapy prescription form.  A treatment calculation was also performed and documented on the treatment calculation form.  Based on the prescription, the patient was scheduled for a series of fractional treatments.
Date Interruption Stopped: 11/05/2019
Pathology Override (Pathology Will Render As Diagnosis Name If Left Blank): Basal Cell Carcinoma Nodular
Number Of Treatment Days: 36
Depth (Optional-Please Include Units) Rx 2: 1.35mm
Treatment Device Design After Initial Simulation Justification (Will Render If Bill For Treatment Devices = Yes): The patient is status post radiation simulation and is evaluated as to the use of additional devices for shielding and placement for radiation therapy.
Field Number: 1
Dose Per Fractionation In Cgy (Optional): 265.29
Cumulative Dose In Cgy (Optional): 4829.44
Energy (Optional-Please Include Units): 70kV
Pathology Override (Pathology Will Render As Diagnosis Name If Left Blank): Superficial Basal Cell Carcinoma
Bill For Radiation Treatment: Yes
Total Number Of Fractions Rx 3: 15
Shielding Size (Optional- Include Units) Rx 2: 1.5cm x 1.5cm
Decay Factor: 0.98
Number Of Days Off Treatment: 8
Tdf Of Fractionations Delivered: 71
Total Dose (Optional-Please Include Units): 5394.6cGy
Daily Fractionated Dose (Optional- Include Units): 268.75cGy
Fractions / Week Rx 4: 5
Simple Simulation Preamble Text Will Be Included With Simple Simulations (.......... Indications): Simple simulation was performed today for the following reasons:
Dose Per Fractionation In Cgy (Optional): 267.9
Field Size (Applicator): 1.5 cm
Patient Positioning: Supine
Treatment Margins In Cm: 0.5
Time Dose Fractionation (Optional- Include Units If Applicable): 47
Treatment Time / Fractionation (Optional- Include Units): 0.43min
Day Of The Week Treatment Administered: Wednesday
Energy (Include Units): 50Kv
Daily Fractionated Dose (Optional- Include Units) Rx 2: 268.56cGy
Total Number Of Fractions: 10
Daily Fractionated Dose (Optional- Include Units): 269.75cGy
Intro Statement (Will Not Render If Left Blank): The patient is undergoing superficial radiation therapy for skin cancer and presents for weekly evaluation and management.  Per protocol and as documented on the flow sheet, the patient was questioned as to subjective redness, pruritus, pain, drainage, fatigue, or any other symptoms.  Objectively, the radiation area was evaluated with regards to erythema, atrophy, scale, crusting, erosion, ulceration, edema, purpura, tenderness, warmth, drainage, and any other findings.  The plan was extensively reviewed including the dose, and dosing schedule.  The simulation and clinical setup was also reviewed as was the external and any internal shields and based on this review the appropriateness and sufficiency of treatment was determined.
Date Treatment Started: 09/23/2019
Total Dose (Optional-Please Include Units) Rx 2: 2685.6cGy
Date Interruption Started: 10/29/2019
Comments: On Target, RTOG 1
Original Tdf: 95
Reason For Treatment Interruption: Trip
Treatment Time / Fractionation (Optional- Include Units) Rx 2: 0.36min
Cumulative Dose In Cgy (Optional): 4851.48
Functional Status: 0 (fully active)
Field Number: 2
Custom Shielding Preamble Text Will Not Be Included With Simple Simulations (.......... X X Y Cm): A lead shield of 0.762 mm thickness is utilized to form a molded, custom shield with a
Date Treatment Resumed: 11/06/2019
Detail Level: Detailed
Computed Treatment Time In Min (Will Render The Same As Calculated Treatment Time If Left Blank): 0.37
Date Treatment Stopped: 10/28/2019
Total Dose (Optional-Please Include Units): 2687.5cGy
Treatment Time / Fractionation (Optional- Include Units): 0.37min
Shielding Size (Optional- Include Units): 1.5cm x1.5cm
Assessment: Appropriate reaction

## 2019-11-15 ENCOUNTER — APPOINTMENT (OUTPATIENT)
Age: 69
Setting detail: DERMATOLOGY
End: 2019-11-19

## 2019-11-15 PROBLEM — C44.719 BASAL CELL CARCINOMA OF SKIN OF LEFT LOWER LIMB, INCLUDING HIP: Status: ACTIVE | Noted: 2019-11-15

## 2019-11-15 PROBLEM — C44.1122 BASAL CELL CARCINOMA OF SKIN OF RIGHT LOWER EYELID, INCLUDING CANTHUS: Status: ACTIVE | Noted: 2019-11-15

## 2019-11-15 PROCEDURE — G6001 ECHO GUIDANCE RADIOTHERAPY: HCPCS

## 2019-11-15 PROCEDURE — OTHER TREATMENT REGIMEN: OTHER

## 2019-11-15 PROCEDURE — 77280 THER RAD SIMULAJ FIELD SMPL: CPT

## 2019-11-15 PROCEDURE — 77401 RADIATION TX DELIVERY SUPFC: CPT

## 2019-11-15 PROCEDURE — OTHER SUPERFICIAL RADIATION TREATMENT: OTHER

## 2019-11-15 PROCEDURE — OTHER FOLLOW UP FOR NEXT VISIT: OTHER

## 2019-11-15 NOTE — PROCEDURE: SUPERFICIAL RADIATION TREATMENT
Total Number Of Fractions Rx 4: 15
Number Of Days Off Treatment: 8
Include Rx 2 When Rendering Additional Prescriptions: No
Simple Simulation Preamble Text Will Be Included With Simple Simulations (.......... Indications): Simple simulation was performed today for the following reasons:
Number Of Treatment Days: 36
Assessment: Appropriate reaction
Fractions / Week Rx 2: 3
Depth (Optional-Please Include Units) Rx 2: 1.35mm
Day Of The Week Treatment Administered: Friday
Custom Shielding Afterword Text Will Not Be Included With Simple Simulations (X X Y Cm............): port to correlate with the lesion size, including treatment margin. The custom lead shield is adequate to accommodate the appropriate applicator and provide adequate shielding around the treatment site. Additional shielding (as noted below) is used to protect sensitive, normal tissues.
Shielding Size (Optional- Include Units) Rx 2: 1.5cm x 1.5cm
Information: Selecting Yes will display possible errors in your note based on the variables you have selected. This validation is only offered as a suggestion for you. PLEASE NOTE THAT THE VALIDATION TEXT WILL BE REMOVED WHEN YOU FINALIZE YOUR NOTE. IF YOU WANT TO FAX A PRELIMINARY NOTE YOU WILL NEED TO TOGGLE THIS TO 'NO' IF YOU DO NOT WANT IT IN YOUR FAXED NOTE.
Treatment Time In Min (Optional): 0.36
Dimensions-Y Axis In Cm: 1
Detail Level: Detailed
Date Interruption Stopped: 11/05/2019
Functional Status: 0 (fully active)
Dose / Tx In Cgy (Optional): 267.9
Date Treatment Started: 09/23/2019
Treatment Device Design After Initial Simulation Justification (Will Render If Bill For Treatment Devices = Yes): The patient is status post radiation simulation and is evaluated as to the use of additional devices for shielding and placement for radiation therapy.
Daily Fractionated Dose (Optional- Include Units) Rx 2: cGy
Energy (Optional-Please Include Units) Rx 2: 50Kv
Date Treatment Stopped: 10/28/2019
Field Size (Applicator): 2.0 cm
Energy (Optional-Please Include Units): 70kV
Treatment Time / Fractionation (Optional- Include Units) Rx 2: min
Field Size (Applicator): 1.5 cm
Comments: On Target, RTOG 1
Daily Fractionated Dose (Optional- Include Units) Rx 2: 268.56cGy
Patient Positioning: Supine
Total Number Of Fractions Rx 2: 10
Dose / Tx In Cgy (Optional): 265.29
Date Interruption Started: 10/29/2019
Daily Fractionated Dose (Optional- Include Units): 269.75cGy
Fractionation Number: 19
Treatment Time / Fractionation (Optional- Include Units) Rx 2: 0.36min
Dimensions-Y Axis In Cm: 0.5
Reason For Treatment Interruption: Trip
Initial Radiation Treatment Planning (Will Render If Bill Simulation = Yes): The patient had a complete consultation regarding all applicable modalities for the treatment of their skin cancer and based on a variety of factors including the type of tumor, size, and location, the relevant medical history as well as local tissue factors, the functional status of the individual, the ability to perform necessary postoperative wound instructions and the need for simultaneous treatments as well as overall wound healing status, it was determined that the patient would begin radiation therapy treatment for skin cancer.  A full simulation and treatment device design was performed including the determination and formulation of appropriate simple and complex devices including lead shield of 0.762 mm thickness to form molded customized shielding to specifically correlate with the lesion size including treatment margin.  The custom lead shield is adequate to accommodate the appropriate applicator and provide adequate shielding around the treatment site.  The specific field applicator, shields, and devices both simple and complex as well as the specific patient setup is outlined below.  The patient was given a full consent for superficial radiation to both verbally and in writing and the full determination of patient's eligibility for treatment and selection is outlined on the patient eligibility and treatment selection form.  The specific superficial radiotherapy prescription was determined and was documented on the superficial radiotherapy prescription form.  A treatment calculation was also performed and documented on the treatment calculation form.  Based on the prescription, the patient was scheduled for a series of fractional treatments.
Computed Treatment Time In Min (Will Render The Same As Calculated Treatment Time If Left Blank): 0.38
Treatment Time / Fractionation (Optional- Include Units): 0.37min
Cumulative Dose In Cgy (Optional): 5094.73
Pathology Override (Pathology Will Render As Diagnosis Name If Left Blank): Basal Cell Carcinoma Nodular
Bill For Radiation Treatment: Yes
Tdf Of Fractionations Delivered: 71
Pathology Override (Pathology Will Render As Diagnosis Name If Left Blank): Superficial Basal Cell Carcinoma
Total Dose (Optional-Please Include Units): 2687.5cGy
Custom Shielding Preamble Text Will Not Be Included With Simple Simulations (.......... X X Y Cm): A lead shield of 0.762 mm thickness is utilized to form a molded, custom shield with a
Decay Factor: 0.98
Original Tdf: 95
Total Number Of Fractions: 20
Intro Statement (Will Not Render If Left Blank): The patient is undergoing superficial radiation therapy for skin cancer and presents for weekly evaluation and management.  Per protocol and as documented on the flow sheet, the patient was questioned as to subjective redness, pruritus, pain, drainage, fatigue, or any other symptoms.  Objectively, the radiation area was evaluated with regards to erythema, atrophy, scale, crusting, erosion, ulceration, edema, purpura, tenderness, warmth, drainage, and any other findings.  The plan was extensively reviewed including the dose, and dosing schedule.  The simulation and clinical setup was also reviewed as was the external and any internal shields and based on this review the appropriateness and sufficiency of treatment was determined.
Field Number: 2
Fractions / Week Rx 4: 5
Shielding Size (Optional- Include Units): 1.5cm x1.5cm
Date Treatment Resumed: 11/06/2019
Time Dose Fractionation (Optional- Include Units If Applicable) Rx 2: 47
Cumulative Dose In Cgy (Optional): 5119.38
Total Dose (Optional-Please Include Units): 5394.6cGy
Treatment Time / Fractionation (Optional- Include Units): 0.43min
Original Tdf: 94
Computed Treatment Time In Min (Will Render The Same As Calculated Treatment Time If Left Blank): 0.37
Total Dose (Optional-Please Include Units) Rx 2: 2685.6cGy
Daily Fractionated Dose (Optional- Include Units): 268.75cGy

## 2019-11-15 NOTE — PROCEDURE: TREATMENT REGIMEN
Detail Level: Zone
Modify Regimen: \\n
Plan: This patient has been treated today with image guided superficial radiation therapy for non-melanoma skin cancer. Written informed consent has been previously obtained from this patient for this treatment. This consent is documented in the patient’s chart. The patient gave verbal consent to continue treatment today.\\n\\n\\nThe patient was treated with a specific radiation dose and setup as prescribed by the provider listed on this visit note. A Radiation Therapist performed administration of radiation under supervision of provider. The treatment parameters and cumulative dose are indicated above.\\n\\n\\nPrior to administering the radiation, the patient underwent a verification therapeutic radiology simulation-aided field setting defining relevant normal and abnormal target anatomy and acquiring images with high frequency ultrasound in addition to data necessary developing optimal radiation treatment process for the patient. This process includes verification of the treatment port(s) and proper treatment positioning. All treatment ports were photographed within electronic medical record. The patient’s customized lead blocking along with gross tumor volume and margin was confirmed. Considering superficial radiotherapy is clinical in setup, this requires physician and radiation therapist to clarify location interest being treated against initial images, pathology and patient anatomy. Care was taken ensuring fields treated were geometrically accurate and properly positioned using therapeutic radiology simulation-aided field setting verification per fraction. This process is also utilized to determine if any prescription or setup changes are necessary. These steps are therefore medically necessary ensuring safe and effective administration of radiation. Ongoing therapeutic radiology simulation-aided field setting verification is ordered throughout course of therapy.\\n\\n\\nA high frequency ultrasound image was acquired today for two-dimensional evaluation of the tumor volume and response to treatment, in addition to geometric accuracy of field placement. \\n\\nUS depth is 1.0mm (edema), which is 0.04mm in difference from previous imaging. Repop +++\\n\\nThe field placement and ultrasound imaging, per fraction, is separate and distinct from the initial simulation, and is an important task in providing safe administration of superficial radiation therapy. Physician evaluation of the ultrasound tumor depth will be ongoing throughout the course of treatment and is deemed medically necessary in order to ensure the efficacy of treatment and any necessary changes. Today’s image was evaluated for determination of continuation of treatment with the current plan or with necessary changes as appropriate. \\n\\n\\nAccording to provider review of verification therapeutic radiology simulation-aided field setting and imaging, energy change is required.  Decrease in energy from 70kV to 50kV due to prescription protocol. Additionally, the use of ultrasound visualization and targeted assessment allows the patient to be able to see their cancer(s) progress, encouraging patient to complete and maintain compliance through full course of radiotherapy.\\n\\nPer Dr. Reid, continued ultrasound guidance and therapeutic radiology simulation-aided field setting verification per fraction is required for field placement, measurement of tumor depth, progress and acute effect monitoring.\\n
Plan: This patient has been treated today with image guided superficial radiation therapy for non-melanoma skin cancer. Written informed consent has been previously obtained from this patient for this treatment. This consent is documented in the patient’s chart. The patient gave verbal consent to continue treatment today.\\n\\n\\nThe patient was treated with a specific radiation dose and setup as prescribed by the provider listed on this visit note. A Radiation Therapist performed administration of radiation under supervision of provider. The treatment parameters and cumulative dose are indicated above.\\n\\n\\nPrior to administering the radiation, the patient underwent a verification therapeutic radiology simulation-aided field setting defining relevant normal and abnormal target anatomy and acquiring images with high frequency ultrasound in addition to data necessary developing optimal radiation treatment process for the patient. This process includes verification of the treatment port(s) and proper treatment positioning. All treatment ports were photographed within electronic medical record. The patient’s customized lead blocking along with gross tumor volume and margin was confirmed. Considering superficial radiotherapy is clinical in setup, this requires physician and radiation therapist to clarify location interest being treated against initial images, pathology and patient anatomy. Care was taken ensuring fields treated were geometrically accurate and properly positioned using therapeutic radiology simulation-aided field setting verification per fraction. This process is also utilized to determine if any prescription or setup changes are necessary. These steps are therefore medically necessary ensuring safe and effective administration of radiation. Ongoing therapeutic radiology simulation-aided field setting verification is ordered throughout course of therapy.\\n\\nThe field placement and ultrasound imaging, per fraction, is separate and distinct from the initial simulation, and is an important task in providing safe administration of superficial radiation therapy. Physician evaluation of the ultrasound tumor depth will be ongoing throughout the course of treatment and is deemed medically necessary in order to ensure the efficacy of treatment and any necessary changes. Today’s image was evaluated for determination of continuation of treatment with the current plan or with necessary changes as appropriate. \\n\\n\\nAccording to provider review of verification therapeutic radiology simulation-aided field setting and imaging, NO change is required. Additionally, the use of ultrasound visualization and targeted assessment allows the patient to be able to see their cancer(s) progress, encouraging patient to complete and maintain compliance through full course of radiotherapy.\\n\\nPer Dr. Reid, continued ultrasound guidance and therapeutic radiology simulation-aided field setting verification per fraction is required for field placement, measurement of tumor depth, progress and acute effect monitoring. \\n\\n

## 2019-11-18 ENCOUNTER — APPOINTMENT (OUTPATIENT)
Age: 69
Setting detail: DERMATOLOGY
End: 2019-11-19

## 2019-11-18 PROBLEM — C44.1122 BASAL CELL CARCINOMA OF SKIN OF RIGHT LOWER EYELID, INCLUDING CANTHUS: Status: ACTIVE | Noted: 2019-11-18

## 2019-11-18 PROBLEM — C44.719 BASAL CELL CARCINOMA OF SKIN OF LEFT LOWER LIMB, INCLUDING HIP: Status: ACTIVE | Noted: 2019-11-18

## 2019-11-18 PROCEDURE — 77280 THER RAD SIMULAJ FIELD SMPL: CPT

## 2019-11-18 PROCEDURE — OTHER FOLLOW UP FOR NEXT VISIT: OTHER

## 2019-11-18 PROCEDURE — G6001 ECHO GUIDANCE RADIOTHERAPY: HCPCS

## 2019-11-18 PROCEDURE — 77401 RADIATION TX DELIVERY SUPFC: CPT

## 2019-11-18 PROCEDURE — OTHER TREATMENT REGIMEN: OTHER

## 2019-11-18 PROCEDURE — OTHER SUPERFICIAL RADIATION TREATMENT: OTHER

## 2019-11-18 NOTE — PROCEDURE: TREATMENT REGIMEN
Plan: This patient has been treated today with image guided superficial radiation therapy for non-melanoma skin cancer. Written informed consent has been previously obtained from this patient for this treatment. This consent is documented in the patient’s chart. The patient gave verbal consent to continue treatment today.\\n\\n\\nThe patient was treated with a specific radiation dose and setup as prescribed by the provider listed on this visit note. A Radiation Therapist performed administration of radiation under supervision of provider. The treatment parameters and cumulative dose are indicated above.\\n\\n\\nPrior to administering the radiation, the patient underwent a verification therapeutic radiology simulation-aided field setting defining relevant normal and abnormal target anatomy and acquiring images with high frequency ultrasound in addition to data necessary developing optimal radiation treatment process for the patient. This process includes verification of the treatment port(s) and proper treatment positioning. All treatment ports were photographed within electronic medical record. The patient’s customized lead blocking along with gross tumor volume and margin was confirmed. Considering superficial radiotherapy is clinical in setup, this requires physician and radiation therapist to clarify location interest being treated against initial images, pathology and patient anatomy. Care was taken ensuring fields treated were geometrically accurate and properly positioned using therapeutic radiology simulation-aided field setting verification per fraction. This process is also utilized to determine if any prescription or setup changes are necessary. These steps are therefore medically necessary ensuring safe and effective administration of radiation. Ongoing therapeutic radiology simulation-aided field setting verification is ordered throughout course of therapy.\\n\\nThe field placement and ultrasound imaging, per fraction, is separate and distinct from the initial simulation, and is an important task in providing safe administration of superficial radiation therapy. Physician evaluation of the ultrasound tumor depth will be ongoing throughout the course of treatment and is deemed medically necessary in order to ensure the efficacy of treatment and any necessary changes. Today’s image was evaluated for determination of continuation of treatment with the current plan or with necessary changes as appropriate. \\n\\n\\nAccording to provider review of verification therapeutic radiology simulation-aided field setting and imaging, NO change is required. Additionally, the use of ultrasound visualization and targeted assessment allows the patient to be able to see their cancer(s) progress, encouraging patient to complete and maintain compliance through full course of radiotherapy.\\n\\nPer Dr. Reid, continued ultrasound guidance and therapeutic radiology simulation-aided field setting verification per fraction is required for field placement, measurement of tumor depth, progress and acute effect monitoring. \\n\\n
Detail Level: Zone
Plan: This patient has been treated today with image guided superficial radiation therapy for non-melanoma skin cancer. Written informed consent has been previously obtained from this patient for this treatment. This consent is documented in the patient’s chart. The patient gave verbal consent to continue treatment today.\\n\\n\\nThe patient was treated with a specific radiation dose and setup as prescribed by the provider listed on this visit note. A Radiation Therapist performed administration of radiation under supervision of provider. The treatment parameters and cumulative dose are indicated above.\\n\\n\\nPrior to administering the radiation, the patient underwent a verification therapeutic radiology simulation-aided field setting defining relevant normal and abnormal target anatomy and acquiring images with high frequency ultrasound in addition to data necessary developing optimal radiation treatment process for the patient. This process includes verification of the treatment port(s) and proper treatment positioning. All treatment ports were photographed within electronic medical record. The patient’s customized lead blocking along with gross tumor volume and margin was confirmed. Considering superficial radiotherapy is clinical in setup, this requires physician and radiation therapist to clarify location interest being treated against initial images, pathology and patient anatomy. Care was taken ensuring fields treated were geometrically accurate and properly positioned using therapeutic radiology simulation-aided field setting verification per fraction. This process is also utilized to determine if any prescription or setup changes are necessary. These steps are therefore medically necessary ensuring safe and effective administration of radiation. Ongoing therapeutic radiology simulation-aided field setting verification is ordered throughout course of therapy.\\n\\n\\nA high frequency ultrasound image was acquired today for two-dimensional evaluation of the tumor volume and response to treatment, in addition to geometric accuracy of field placement. \\n\\nUS depth is 0.98mm, which is 0.02mm in difference from previous imaging. Repop +++\\n\\nThe field placement and ultrasound imaging, per fraction, is separate and distinct from the initial simulation, and is an important task in providing safe administration of superficial radiation therapy. Physician evaluation of the ultrasound tumor depth will be ongoing throughout the course of treatment and is deemed medically necessary in order to ensure the efficacy of treatment and any necessary changes. Today’s image was evaluated for determination of continuation of treatment with the current plan or with necessary changes as appropriate. \\n\\n\\nAccording to provider review of verification therapeutic radiology simulation-aided field setting and imaging, energy change is required.  Decrease in energy from 70kV to 50kV due to prescription protocol. Additionally, the use of ultrasound visualization and targeted assessment allows the patient to be able to see their cancer(s) progress, encouraging patient to complete and maintain compliance through full course of radiotherapy.\\n\\nPer Dr. Reid, continued ultrasound guidance and therapeutic radiology simulation-aided field setting verification per fraction is required for field placement, measurement of tumor depth, progress and acute effect monitoring.\\n

## 2019-11-18 NOTE — PROCEDURE: SUPERFICIAL RADIATION TREATMENT
Intro Statement (Will Not Render If Left Blank): The patient is undergoing superficial radiation therapy for skin cancer and presents for weekly evaluation and management.  Per protocol and as documented on the flow sheet, the patient was questioned as to subjective redness, pruritus, pain, drainage, fatigue, or any other symptoms.  Objectively, the radiation area was evaluated with regards to erythema, atrophy, scale, crusting, erosion, ulceration, edema, purpura, tenderness, warmth, drainage, and any other findings.  The plan was extensively reviewed including the dose, and dosing schedule.  The simulation and clinical setup was also reviewed as was the external and any internal shields and based on this review the appropriateness and sufficiency of treatment was determined.
Fractionation Number: 20
Detail Level: Detailed
Fractionation Number (Evaluation): 5
Dose / Tx In Cgy (Optional): 265.29
Render Additional Prescriptions In Note?: No
Number Of Treatment Days: 36
Total Number Of Fractions Rx 4: 15
Field Size (Applicator): 1.5 cm
Total Dose (Optional-Please Include Units) Rx 2: 2685.6cGy
Energy (Optional-Please Include Units) Rx 2: 50Kv
Date Interruption Started: 10/29/2019
Simple Simulation Afterword Text Will Be Included With Simple Simulations (Indications............): The patient had a complete consultation regarding all applicable modalities for the treatment of their skin cancer and based on a variety of factors including the type of tumor, size, and location, the relevant medical history as well as local tissue factors, the functional status of the individual, the ability to perform necessary postoperative wound instructions and the need for simultaneous treatments as well as overall wound healing status, it was determined that the patient would begin radiation therapy treatment for skin cancer.  A full simulation and treatment device design was performed including the determination and formulation of appropriate simple and complex devices including lead shield of 0.762 mm thickness to form molded customized shielding to specifically correlate with the lesion size including treatment margin.  The custom lead shield is adequate to accommodate the appropriate applicator and provide adequate shielding around the treatment site.  The specific field applicator, shields, and devices both simple and complex as well as the specific patient setup is outlined below.  The patient was given a full consent for superficial radiation to both verbally and in writing and the full determination of patient's eligibility for treatment and selection is outlined on the patient eligibility and treatment selection form.  The specific superficial radiotherapy prescription was determined and was documented on the superficial radiotherapy prescription form.  A treatment calculation was also performed and documented on the treatment calculation form.  Based on the prescription, the patient was scheduled for a series of fractional treatments.
Treatment Time / Fractionation (Optional- Include Units) Rx 2: 0.36min
Day Of The Week Treatment Administered: Monday
Depth (Optional-Please Include Units) Rx 2: 1.35mm
Prescription Used: 1
Date Treatment Started: 09/23/2019
Tdf Of Fractionations Delivered: 71
Reason For Treatment Interruption: Trip
Total Dose (Optional-Please Include Units): 5394.6cGy
Pathology Override (Pathology Will Render As Diagnosis Name If Left Blank): Superficial Basal Cell Carcinoma
Simple Simulation Preamble Text Will Be Included With Simple Simulations (.......... Indications): Simple simulation was performed today for the following reasons:
Dose / Tx In Cgy (Optional): 267.9
Cumulative Dose In Cgy (Optional): 5387.28
Shielding Size (Optional- Include Units) Rx 2: 1.5cm x 1.5cm
Custom Shielding Afterword Text Will Not Be Included With Simple Simulations (X X Y Cm............): port to correlate with the lesion size, including treatment margin. The custom lead shield is adequate to accommodate the appropriate applicator and provide adequate shielding around the treatment site. Additional shielding (as noted below) is used to protect sensitive, normal tissues.
Fractions / Week Rx 2: 3
Field Size (Applicator) Rx 2: 2.0 cm
Energy (Optional-Please Include Units): 70kV
Field Number: 2
Time Dose Fractionation (Optional- Include Units If Applicable): 95
Comments: On Target, RTOG 1
Time Dose Fractionation (Optional- Include Units If Applicable) Rx 2: 47
Total Number Of Fractions: 10
Treatment Time In Min (Optional): 0.36
Date Treatment Stopped: 10/28/2019
Date Treatment Resumed: 11/06/2019
Dimensions-X Axis In Cm: 0.5
Treatment Time / Fractionation (Optional- Include Units): 0.43min
Total Dose (Optional-Please Include Units) Rx 2: cGy
Custom Shielding Preamble Text Will Not Be Included With Simple Simulations (.......... X X Y Cm): A lead shield of 0.762 mm thickness is utilized to form a molded, custom shield with a
Information: Selecting Yes will display possible errors in your note based on the variables you have selected. This validation is only offered as a suggestion for you. PLEASE NOTE THAT THE VALIDATION TEXT WILL BE REMOVED WHEN YOU FINALIZE YOUR NOTE. IF YOU WANT TO FAX A PRELIMINARY NOTE YOU WILL NEED TO TOGGLE THIS TO 'NO' IF YOU DO NOT WANT IT IN YOUR FAXED NOTE.
Bill For Radiation Treatment: Yes
Decay Factor: 0.98
Treatment Time In Min (Optional): 0.38
Date Interruption Stopped: 11/05/2019
Total Dose (Optional-Please Include Units): 2687.5cGy
Daily Fractionated Dose (Optional- Include Units): 268.75cGy
Treatment Time / Fractionation (Optional- Include Units): 0.37min
Computed Treatment Time In Min (Will Render The Same As Calculated Treatment Time If Left Blank): 0.37
Original Tdf: 94
Functional Status: 0 (fully active)
Shielding Size (Optional- Include Units): 1.5cm x1.5cm
Cumulative Dose In Cgy (Optional): 5360.02
Number Of Days Off Treatment: 8
Daily Fractionated Dose (Optional- Include Units) Rx 2: 268.56cGy
Pathology Override (Pathology Will Render As Diagnosis Name If Left Blank): Basal Cell Carcinoma Nodular
Daily Fractionated Dose (Optional- Include Units): 269.75cGy
Treatment Device Design After Initial Simulation Justification (Will Render If Bill For Treatment Devices = Yes): The patient is status post radiation simulation and is evaluated as to the use of additional devices for shielding and placement for radiation therapy.
Treatment Time / Fractionation (Optional- Include Units) Rx 2: min
Patient Positioning: Supine
Assessment: Appropriate reaction

## 2019-11-20 ENCOUNTER — APPOINTMENT (OUTPATIENT)
Age: 69
Setting detail: DERMATOLOGY
End: 2019-12-04

## 2019-11-20 PROBLEM — C44.1122 BASAL CELL CARCINOMA OF SKIN OF RIGHT LOWER EYELID, INCLUDING CANTHUS: Status: ACTIVE | Noted: 2019-11-20

## 2019-11-20 PROBLEM — C44.719 BASAL CELL CARCINOMA OF SKIN OF LEFT LOWER LIMB, INCLUDING HIP: Status: ACTIVE | Noted: 2019-11-20

## 2019-11-20 PROCEDURE — G6001 ECHO GUIDANCE RADIOTHERAPY: HCPCS

## 2019-11-20 PROCEDURE — 77280 THER RAD SIMULAJ FIELD SMPL: CPT

## 2019-11-20 PROCEDURE — OTHER SUPERFICIAL RADIATION TREATMENT: OTHER

## 2019-11-20 PROCEDURE — OTHER FOLLOW UP FOR NEXT VISIT: OTHER

## 2019-11-20 PROCEDURE — OTHER TREATMENT REGIMEN: OTHER

## 2019-11-20 PROCEDURE — 77401 RADIATION TX DELIVERY SUPFC: CPT

## 2019-11-20 NOTE — PROCEDURE: TREATMENT REGIMEN
Plan: This patient has been treated today with image guided superficial radiation therapy for non-melanoma skin cancer. Written informed consent has been previously obtained from this patient for this treatment. This consent is documented in the patient’s chart. The patient gave verbal consent to continue treatment today.\\n\\n\\nThe patient was treated with a specific radiation dose and setup as prescribed by the provider listed on this visit note. A Radiation Therapist performed administration of radiation under supervision of provider. The treatment parameters and cumulative dose are indicated above.\\n\\n\\nPrior to administering the radiation, the patient underwent a verification therapeutic radiology simulation-aided field setting defining relevant normal and abnormal target anatomy and acquiring images with high frequency ultrasound in addition to data necessary developing optimal radiation treatment process for the patient. This process includes verification of the treatment port(s) and proper treatment positioning. All treatment ports were photographed within electronic medical record. The patient’s customized lead blocking along with gross tumor volume and margin was confirmed. Considering superficial radiotherapy is clinical in setup, this requires physician and radiation therapist to clarify location interest being treated against initial images, pathology and patient anatomy. Care was taken ensuring fields treated were geometrically accurate and properly positioned using therapeutic radiology simulation-aided field setting verification per fraction. This process is also utilized to determine if any prescription or setup changes are necessary. These steps are therefore medically necessary ensuring safe and effective administration of radiation. Ongoing therapeutic radiology simulation-aided field setting verification is ordered throughout course of therapy.\\n\\nThe field placement and ultrasound imaging, per fraction, is separate and distinct from the initial simulation, and is an important task in providing safe administration of superficial radiation therapy. Physician evaluation of the ultrasound tumor depth will be ongoing throughout the course of treatment and is deemed medically necessary in order to ensure the efficacy of treatment and any necessary changes. Today’s image was evaluated for determination of continuation of treatment with the current plan or with necessary changes as appropriate. \\n\\n\\nAccording to provider review of verification therapeutic radiology simulation-aided field setting and imaging, NO change is required. Additionally, the use of ultrasound visualization and targeted assessment allows the patient to be able to see their cancer(s) progress, encouraging patient to complete and maintain compliance through full course of radiotherapy.\\n\\nPer Dr. Reid, continued ultrasound guidance and therapeutic radiology simulation-aided field setting verification per fraction is required for field placement, measurement of tumor depth, progress and acute effect monitoring. \\n\\n
Plan: This patient has been treated today with image guided superficial radiation therapy for non-melanoma skin cancer. Written informed consent has been previously obtained from this patient for this treatment. This consent is documented in the patient’s chart. The patient gave verbal consent to continue treatment today.\\n\\n\\nThe patient was treated with a specific radiation dose and setup as prescribed by the provider listed on this visit note. A Radiation Therapist performed administration of radiation under supervision of provider. The treatment parameters and cumulative dose are indicated above.\\n\\n\\nPrior to administering the radiation, the patient underwent a verification therapeutic radiology simulation-aided field setting defining relevant normal and abnormal target anatomy and acquiring images with high frequency ultrasound in addition to data necessary developing optimal radiation treatment process for the patient. This process includes verification of the treatment port(s) and proper treatment positioning. All treatment ports were photographed within electronic medical record. The patient’s customized lead blocking along with gross tumor volume and margin was confirmed. Considering superficial radiotherapy is clinical in setup, this requires physician and radiation therapist to clarify location interest being treated against initial images, pathology and patient anatomy. Care was taken ensuring fields treated were geometrically accurate and properly positioned using therapeutic radiology simulation-aided field setting verification per fraction. This process is also utilized to determine if any prescription or setup changes are necessary. These steps are therefore medically necessary ensuring safe and effective administration of radiation. Ongoing therapeutic radiology simulation-aided field setting verification is ordered throughout course of therapy.\\n\\n\\nA high frequency ultrasound image was acquired today for two-dimensional evaluation of the tumor volume and response to treatment, in addition to geometric accuracy of field placement. \\n\\nUS depth is 0.58mm, which is 0.4mm in difference from previous imaging. Repop +++\\n\\nThe field placement and ultrasound imaging, per fraction, is separate and distinct from the initial simulation, and is an important task in providing safe administration of superficial radiation therapy. Physician evaluation of the ultrasound tumor depth will be ongoing throughout the course of treatment and is deemed medically necessary in order to ensure the efficacy of treatment and any necessary changes. Today’s image was evaluated for determination of continuation of treatment with the current plan or with necessary changes as appropriate. \\n\\n\\nAccording to provider review of verification therapeutic radiology simulation-aided field setting and imaging, energy change is required.  Decrease in energy from 70kV to 50kV due to prescription protocol. Additionally, the use of ultrasound visualization and targeted assessment allows the patient to be able to see their cancer(s) progress, encouraging patient to complete and maintain compliance through full course of radiotherapy.\\n\\nPer Dr. Reid, continued ultrasound guidance and therapeutic radiology simulation-aided field setting verification per fraction is required for field placement, measurement of tumor depth, progress and acute effect monitoring.\\n
Detail Level: Zone

## 2019-11-20 NOTE — PROCEDURE: SUPERFICIAL RADIATION TREATMENT
Field Size (Applicator): 1.5 cm
Energy (Optional-Please Include Units) Rx 2: 50Kv
Fractions / Week Rx 4: 5
Decay Factor: 0.98
Treatment Time In Min (Optional): 0.37
Date Treatment Stopped: 10/28/2019
Depth (Optional-Please Include Units) Rx 2: 1.35mm
Treatment Time In Min (Optional): 0.38
Include Rx 3 When Rendering Additional Prescriptions: No
Simple Simulation Preamble Text Will Be Included With Simple Simulations (.......... Indications): Simple simulation was performed today for the following reasons:
Shielding Size (Optional- Include Units) Rx 2: 1.5cm x 1.5cm
Cumulative Dose In Cgy (Optional): 5655.18
Treatment Margins In Cm: 0.5
Time Dose Fractionation (Optional- Include Units If Applicable) Rx 2: 47
Intro Statement (Will Not Render If Left Blank): The patient is undergoing superficial radiation therapy for skin cancer and presents for weekly evaluation and management.  Per protocol and as documented on the flow sheet, the patient was questioned as to subjective redness, pruritus, pain, drainage, fatigue, or any other symptoms.  Objectively, the radiation area was evaluated with regards to erythema, atrophy, scale, crusting, erosion, ulceration, edema, purpura, tenderness, warmth, drainage, and any other findings.  The plan was extensively reviewed including the dose, and dosing schedule.  The simulation and clinical setup was also reviewed as was the external and any internal shields and based on this review the appropriateness and sufficiency of treatment was determined.
Dose Per Fractionation In Cgy (Optional): 265.29
Simple Simulation Afterword Text Will Be Included With Simple Simulations (Indications............): The patient had a complete consultation regarding all applicable modalities for the treatment of their skin cancer and based on a variety of factors including the type of tumor, size, and location, the relevant medical history as well as local tissue factors, the functional status of the individual, the ability to perform necessary postoperative wound instructions and the need for simultaneous treatments as well as overall wound healing status, it was determined that the patient would begin radiation therapy treatment for skin cancer.  A full simulation and treatment device design was performed including the determination and formulation of appropriate simple and complex devices including lead shield of 0.762 mm thickness to form molded customized shielding to specifically correlate with the lesion size including treatment margin.  The custom lead shield is adequate to accommodate the appropriate applicator and provide adequate shielding around the treatment site.  The specific field applicator, shields, and devices both simple and complex as well as the specific patient setup is outlined below.  The patient was given a full consent for superficial radiation to both verbally and in writing and the full determination of patient's eligibility for treatment and selection is outlined on the patient eligibility and treatment selection form.  The specific superficial radiotherapy prescription was determined and was documented on the superficial radiotherapy prescription form.  A treatment calculation was also performed and documented on the treatment calculation form.  Based on the prescription, the patient was scheduled for a series of fractional treatments.
Pathology Override (Pathology Will Render As Diagnosis Name If Left Blank): Basal Cell Carcinoma Nodular
Dimensions-X Axis In Cm: 1
Total Number Of Fractions Rx 2: 10
Total Number Of Fractions Rx 3: 15
Date Interruption Stopped: 11/05/2019
Number Of Days Off Treatment: 8
Day Of The Week Treatment Administered: Wednesday
Daily Fractionated Dose (Optional- Include Units) Rx 2: 268.56cGy
Treatment Time / Fractionation (Optional- Include Units) Rx 2: 0.36min
Fractionation Number: 21
Fractions / Week Rx 2: 3
Reason For Treatment Interruption: Trip
Custom Shielding Preamble Text Will Not Be Included With Simple Simulations (.......... X X Y Cm): A lead shield of 0.762 mm thickness is utilized to form a molded, custom shield with a
Tdf Of Fractionations Delivered: 71
Original Tdf: 94
Field Size (Applicator): 2.0 cm
Bill For Radiation Treatment: Yes
Assessment: Appropriate reaction
Date Interruption Started: 10/29/2019
Functional Status: 0 (fully active)
Original Tdf: 95
Total Dose (Optional-Please Include Units): 5394.6cGy
Cumulative Dose In Cgy (Optional): 5625.31
Information: Selecting Yes will display possible errors in your note based on the variables you have selected. This validation is only offered as a suggestion for you. PLEASE NOTE THAT THE VALIDATION TEXT WILL BE REMOVED WHEN YOU FINALIZE YOUR NOTE. IF YOU WANT TO FAX A PRELIMINARY NOTE YOU WILL NEED TO TOGGLE THIS TO 'NO' IF YOU DO NOT WANT IT IN YOUR FAXED NOTE.
Number Of Treatment Days: 36
Comments: On Target, RTOG 1
Detail Level: Detailed
Total Number Of Fractions: 20
Daily Fractionated Dose (Optional- Include Units): 268.75cGy
Treatment Device Design After Initial Simulation Justification (Will Render If Bill For Treatment Devices = Yes): The patient is status post radiation simulation and is evaluated as to the use of additional devices for shielding and placement for radiation therapy.
Custom Shielding Afterword Text Will Not Be Included With Simple Simulations (X X Y Cm............): port to correlate with the lesion size, including treatment margin. The custom lead shield is adequate to accommodate the appropriate applicator and provide adequate shielding around the treatment site. Additional shielding (as noted below) is used to protect sensitive, normal tissues.
Date Treatment Resumed: 11/06/2019
Patient Positioning: Supine
Shielding Size (Optional- Include Units): 1.5cm x1.5cm
Total Dose (Optional-Please Include Units) Rx 2: cGy
Energy (Optional-Please Include Units): 70kV
Pathology Override (Pathology Will Render As Diagnosis Name If Left Blank): Superficial Basal Cell Carcinoma
Daily Fractionated Dose (Optional- Include Units): 269.75cGy
Treatment Time / Fractionation (Optional- Include Units) Rx 2: min
Dose Per Fractionation In Cgy (Optional): 267.9
Date Treatment Started: 09/23/2019
Total Dose (Optional-Please Include Units): 2687.5cGy
Field Number: 2
Treatment Time / Fractionation (Optional- Include Units): 0.43min
Treatment Time / Fractionation (Optional- Include Units): 0.37min
Total Dose (Optional-Please Include Units) Rx 2: 2685.6cGy

## 2019-12-30 ENCOUNTER — APPOINTMENT (OUTPATIENT)
Age: 69
Setting detail: DERMATOLOGY
End: 2019-12-31

## 2019-12-30 PROBLEM — C44.1122 BASAL CELL CARCINOMA OF SKIN OF RIGHT LOWER EYELID, INCLUDING CANTHUS: Status: ACTIVE | Noted: 2019-12-30

## 2019-12-30 PROBLEM — C44.719 BASAL CELL CARCINOMA OF SKIN OF LEFT LOWER LIMB, INCLUDING HIP: Status: ACTIVE | Noted: 2019-12-30

## 2019-12-30 PROCEDURE — 77427 RADIATION TX MANAGEMENT X5: CPT

## 2019-12-30 PROCEDURE — OTHER FOLLOW UP FOR NEXT VISIT: OTHER

## 2019-12-30 PROCEDURE — OTHER TREATMENT REGIMEN: OTHER

## 2019-12-30 PROCEDURE — OTHER SUPERFICIAL RADIATION TREATMENT: OTHER

## 2019-12-30 PROCEDURE — G6001 ECHO GUIDANCE RADIOTHERAPY: HCPCS

## 2019-12-30 NOTE — PROCEDURE: TREATMENT REGIMEN
Plan: Per the request of Dr. Reid, patient was seen today for Superficial Radiation Therapy management.  A high frequency ultrasound image was acquired prior to treatment today for three dimensional evaluation of tumor volume and response to treatment, in addition, geometric accuracy of field placement (CPT®  ). Physician evaluation of the ultrasound tumor depth is ongoing through treatment, and is deemed medically necessary ensuring efficacy of treatment.\\n\\n6 weeks after finishing SRT, evaluation and management of SRT based on prescription and cumulative dose for reaction and response to radiation reveals adequate healing and response with pleasing aesthetics results.  No evidence of cancerous lesion on or around treatment site visible on ultrasound or dermoscopy. RTOG = 0\\n\\nUS depth 0.0mm, Repop +++
Detail Level: Zone

## 2019-12-30 NOTE — PROCEDURE: SUPERFICIAL RADIATION TREATMENT
Treatment Device Design After Initial Simulation Justification (Will Render If Bill For Treatment Devices = Yes): The patient is status post radiation simulation and is evaluated as to the use of additional devices for shielding and placement for radiation therapy.
Include Rx 2 When Rendering Additional Prescriptions: No
Fractions / Week Rx 3: 5
Dimensions-Y Axis In Cm: 0.5
Assessment: Appropriate reaction
Energy (Optional-Please Include Units) Rx 2: 50Kv
Prescription Used: 1
Date Interruption Started: 10/29/2019
Fractionation Number: 21
Shielding Size (Optional- Include Units): 1.5cm x1.5cm
Total Dose (Optional-Please Include Units) Rx 2: cGy
Pathology Override (Pathology Will Render As Diagnosis Name If Left Blank): Superficial Basal Cell Carcinoma
Treatment Time / Fractionation (Optional- Include Units) Rx 2: 0.36min
Treatment Time / Fractionation (Optional- Include Units) Rx 2: min
Depth (Optional-Please Include Units): 1.35mm
Field Size (Applicator): 2.0 cm
Functional Status: 0 (fully active)
Information: Selecting Yes will display possible errors in your note based on the variables you have selected. This validation is only offered as a suggestion for you. PLEASE NOTE THAT THE VALIDATION TEXT WILL BE REMOVED WHEN YOU FINALIZE YOUR NOTE. IF YOU WANT TO FAX A PRELIMINARY NOTE YOU WILL NEED TO TOGGLE THIS TO 'NO' IF YOU DO NOT WANT IT IN YOUR FAXED NOTE.
Date Treatment Started: 09/23/2019
Bill And Render Text From Evaluation And Management Tab (Will Bill [id number]): Yes
Simple Simulation Preamble Text Will Be Included With Simple Simulations (.......... Indications): Simple simulation was performed today for the following reasons:
Shielding Size (Optional- Include Units) Rx 2: 1.5cm x 1.5cm
Simple Simulation Afterword Text Will Be Included With Simple Simulations (Indications............): The patient had a complete consultation regarding all applicable modalities for the treatment of their skin cancer and based on a variety of factors including the type of tumor, size, and location, the relevant medical history as well as local tissue factors, the functional status of the individual, the ability to perform necessary postoperative wound instructions and the need for simultaneous treatments as well as overall wound healing status, it was determined that the patient would begin radiation therapy treatment for skin cancer.  A full simulation and treatment device design was performed including the determination and formulation of appropriate simple and complex devices including lead shield of 0.762 mm thickness to form molded customized shielding to specifically correlate with the lesion size including treatment margin.  The custom lead shield is adequate to accommodate the appropriate applicator and provide adequate shielding around the treatment site.  The specific field applicator, shields, and devices both simple and complex as well as the specific patient setup is outlined below.  The patient was given a full consent for superficial radiation to both verbally and in writing and the full determination of patient's eligibility for treatment and selection is outlined on the patient eligibility and treatment selection form.  The specific superficial radiotherapy prescription was determined and was documented on the superficial radiotherapy prescription form.  A treatment calculation was also performed and documented on the treatment calculation form.  Based on the prescription, the patient was scheduled for a series of fractional treatments.
Date Interruption Stopped: 11/05/2019
Total Number Of Fractions Rx 3: 15
Dose / Tx In Cgy (Optional): 267.9
Treatment Time / Fractionation (Optional- Include Units): 0.37min
Date Treatment Stopped: 10/28/2019
Pathology Override (Pathology Will Render As Diagnosis Name If Left Blank): Basal Cell Carcinoma Nodular
Time Dose Fractionation (Optional- Include Units If Applicable) Rx 2: 47
Reason For Treatment Interruption: Trip
Treatment Time In Min (Optional): 0.37
Total Number Of Fractions Rx 2: 10
Number Of Treatment Days: 36
Number Of Days Off Treatment: 8
Decay Factor: 0.98
Patient Positioning: Supine
Custom Shielding Afterword Text Will Not Be Included With Simple Simulations (X X Y Cm............): port to correlate with the lesion size, including treatment margin. The custom lead shield is adequate to accommodate the appropriate applicator and provide adequate shielding around the treatment site. Additional shielding (as noted below) is used to protect sensitive, normal tissues.
Energy (Optional-Please Include Units): 70kV
Custom Shielding Preamble Text Will Not Be Included With Simple Simulations (.......... X X Y Cm): A lead shield of 0.762 mm thickness is utilized to form a molded, custom shield with a
Fractions / Week Rx 2: 3
Day Of The Week Treatment Administered: Wednesday
Field Size (Applicator): 1.5 cm
Intro Statement (Will Not Render If Left Blank): The patient is undergoing superficial radiation therapy for skin cancer and presents for weekly evaluation and management.  Per protocol and as documented on the flow sheet, the patient was questioned as to subjective redness, pruritus, pain, drainage, fatigue, or any other symptoms.  Objectively, the radiation area was evaluated with regards to erythema, atrophy, scale, crusting, erosion, ulceration, edema, purpura, tenderness, warmth, drainage, and any other findings.  The plan was extensively reviewed including the dose, and dosing schedule.  The simulation and clinical setup was also reviewed as was the external and any internal shields and based on this review the appropriateness and sufficiency of treatment was determined.
Comments: MICHAEL
Total Number Of Fractions: 20
Date Treatment Resumed: 11/06/2019
Detail Level: Detailed
Cumulative Dose In Cgy (Optional): 5655.18
Time Dose Fractionation (Optional- Include Units If Applicable): 95
Dose / Tx In Cgy (Optional): 265.29
Daily Fractionated Dose (Optional- Include Units) Rx 2: 268.56cGy
Field Number: 2
Tdf Of Fractionations Delivered: 71
Day Of The Week Treatment Administered: Monday
Computed Treatment Time In Min (Will Render The Same As Calculated Treatment Time If Left Blank): 0.38
Total Dose (Optional-Please Include Units) Rx 2: 2685.6cGy
Daily Fractionated Dose (Optional- Include Units): 268.75cGy
Daily Fractionated Dose (Optional- Include Units): 269.75cGy
Total Dose (Optional-Please Include Units): 5394.6cGy
Original Tdf: 94
Cumulative Dose In Cgy (Optional): 5625.31
Total Dose (Optional-Please Include Units): 2687.5cGy
Treatment Time / Fractionation (Optional- Include Units): 0.43min

## 2020-02-26 ENCOUNTER — APPOINTMENT (OUTPATIENT)
Age: 70
Setting detail: DERMATOLOGY
End: 2020-02-27

## 2020-02-26 PROBLEM — C44.529 SQUAMOUS CELL CARCINOMA OF SKIN OF OTHER PART OF TRUNK: Status: ACTIVE | Noted: 2020-02-26

## 2020-02-26 PROCEDURE — 11602 EXC TR-EXT MAL+MARG 1.1-2 CM: CPT

## 2020-02-26 PROCEDURE — OTHER MIPS QUALITY: OTHER

## 2020-02-26 PROCEDURE — OTHER EXCISION: OTHER

## 2020-02-26 PROCEDURE — 13101 CMPLX RPR TRUNK 2.6-7.5 CM: CPT

## 2020-02-26 NOTE — PROCEDURE: EXCISION
Complex Requirements: Extensive Undermining Performed?: No
Consent was obtained from the patient. The risks and benefits to therapy were discussed in detail. Specifically, the risks of infection, scarring, bleeding, prolonged wound healing, incomplete removal, allergy to anesthesia, nerve injury and recurrence were addressed. Prior to the procedure, the treatment site was clearly identified and confirmed by the patient. All components of Universal Protocol/PAUSE Rule completed.
Burow's Advancement Flap Text: The defect edges were debeveled with a #15 scalpel blade.  Given the location of the defect and the proximity to free margins a Burow's advancement flap was deemed most appropriate.  Using a sterile surgical marker, the appropriate advancement flap was drawn incorporating the defect and placing the expected incisions within the relaxed skin tension lines where possible.    The area thus outlined was incised deep to adipose tissue with a #15 scalpel blade.  The skin margins were undermined to an appropriate distance in all directions utilizing iris scissors.
O-T Plasty Text: The defect edges were debeveled with a #15 scalpel blade.  Given the location of the defect, shape of the defect and the proximity to free margins an O-T plasty was deemed most appropriate.  Using a sterile surgical marker, an appropriate O-T plasty was drawn incorporating the defect and placing the expected incisions within the relaxed skin tension lines where possible.    The area thus outlined was incised deep to adipose tissue with a #15 scalpel blade.  The skin margins were undermined to an appropriate distance in all directions utilizing iris scissors.
Z Plasty Text: The lesion was extirpated to the level of the fat with a #15 scalpel blade.  Given the location of the defect, shape of the defect and the proximity to free margins a Z-plasty was deemed most appropriate for repair.  Using a sterile surgical marker, the appropriate transposition arms of the Z-plasty were drawn incorporating the defect and placing the expected incisions within the relaxed skin tension lines where possible.    The area thus outlined was incised deep to adipose tissue with a #15 scalpel blade.  The skin margins were undermined to an appropriate distance in all directions utilizing iris scissors.  The opposing transposition arms were then transposed into place in opposite direction and anchored with interrupted buried subcutaneous sutures.
Intermediate Repair Preamble Text (Leave Blank If You Do Not Want): Undermining was performed with blunt dissection.
Excisional Biopsy Additional Text (Leave Blank If You Do Not Want): The margin was drawn around the clinically apparent lesion. An elliptical shape was then drawn on the skin incorporating the lesion and margins.  Incisions were then made along these lines to the appropriate tissue plane and the lesion was extirpated.
Complex Repair And Split-Thickness Skin Graft Text: The defect edges were debeveled with a #15 scalpel blade.  The primary defect was closed partially with a complex linear closure.  Given the location of the defect, shape of the defect and the proximity to free margins a split thickness skin graft was deemed most appropriate to repair the remaining defect.  The graft was trimmed to fit the size of the remaining defect.  The graft was then placed in the primary defect, oriented appropriately, and sutured into place.
Size Of Lesion In Cm: 1
Complex Repair And V-Y Plasty Text: The defect edges were debeveled with a #15 scalpel blade.  The primary defect was closed partially with a complex linear closure.  Given the location of the remaining defect, shape of the defect and the proximity to free margins a V-Y plasty was deemed most appropriate for complete closure of the defect.  Using a sterile surgical marker, an appropriate advancement flap was drawn incorporating the defect and placing the expected incisions within the relaxed skin tension lines where possible.    The area thus outlined was incised deep to adipose tissue with a #15 scalpel blade.  The skin margins were undermined to an appropriate distance in all directions utilizing iris scissors.
Purse String (Simple) Text: Given the location of the defect and the characteristics of the surrounding skin a purse string simple closure was deemed most appropriate.  Undermining was performed circumferentially around the surgical defect.  A purse string suture was then placed and tightened.
Complex Repair And O-T Advancement Flap Text: The defect edges were debeveled with a #15 scalpel blade.  The primary defect was closed partially with a complex linear closure.  Given the location of the remaining defect, shape of the defect and the proximity to free margins an O-T advancement flap was deemed most appropriate for complete closure of the defect.  Using a sterile surgical marker, an appropriate advancement flap was drawn incorporating the defect and placing the expected incisions within the relaxed skin tension lines where possible.    The area thus outlined was incised deep to adipose tissue with a #15 scalpel blade.  The skin margins were undermined to an appropriate distance in all directions utilizing iris scissors.
Mastoid Interpolation Flap Text: A decision was made to reconstruct the defect utilizing an interpolation axial flap and a staged reconstruction.  A telfa template was made of the defect.  This telfa template was then used to outline the mastoid interpolation flap.  The donor area for the pedicle flap was then injected with anesthesia.  The flap was excised through the skin and subcutaneous tissue down to the layer of the underlying musculature.  The pedicle flap was carefully excised within this deep plane to maintain its blood supply.  The edges of the donor site were undermined.   The donor site was closed in a primary fashion.  The pedicle was then rotated into position and sutured.  Once the tube was sutured into place, adequate blood supply was confirmed with blanching and refill.  The pedicle was then wrapped with xeroform gauze and dressed appropriately with a telfa and gauze bandage to ensure continued blood supply and protect the attached pedicle.
Composite Graft Text: The defect edges were debeveled with a #15 scalpel blade.  Given the location of the defect, shape of the defect, the proximity to free margins and the fact the defect was full thickness a composite graft was deemed most appropriate.  The defect was outline and then transferred to the donor site.  A full thickness graft was then excised from the donor site. The graft was then placed in the primary defect, oriented appropriately and then sutured into place.  The secondary defect was then repaired using a primary closure.
Trilobed Flap Text: The defect edges were debeveled with a #15 scalpel blade.  Given the location of the defect and the proximity to free margins a trilobed flap was deemed most appropriate.  Using a sterile surgical marker, an appropriate trilobed flap drawn around the defect.    The area thus outlined was incised deep to adipose tissue with a #15 scalpel blade.  The skin margins were undermined to an appropriate distance in all directions utilizing iris scissors.
Use Complex Repair Preambles?: Yes
Spiral Flap Text: The defect edges were debeveled with a #15 scalpel blade.  Given the location of the defect, shape of the defect and the proximity to free margins a spiral flap was deemed most appropriate.  Using a sterile surgical marker, an appropriate rotation flap was drawn incorporating the defect and placing the expected incisions within the relaxed skin tension lines where possible. The area thus outlined was incised deep to adipose tissue with a #15 scalpel blade.  The skin margins were undermined to an appropriate distance in all directions utilizing iris scissors.
Bi-Rhombic Flap Text: The defect edges were debeveled with a #15 scalpel blade.  Given the location of the defect and the proximity to free margins a bi-rhombic flap was deemed most appropriate.  Using a sterile surgical marker, an appropriate rhombic flap was drawn incorporating the defect. The area thus outlined was incised deep to adipose tissue with a #15 scalpel blade.  The skin margins were undermined to an appropriate distance in all directions utilizing iris scissors.
V-Y Flap Text: The defect edges were debeveled with a #15 scalpel blade.  Given the location of the defect, shape of the defect and the proximity to free margins a V-Y flap was deemed most appropriate.  Using a sterile surgical marker, an appropriate advancement flap was drawn incorporating the defect and placing the expected incisions within the relaxed skin tension lines where possible.    The area thus outlined was incised deep to adipose tissue with a #15 scalpel blade.  The skin margins were undermined to an appropriate distance in all directions utilizing iris scissors.
Deep Sutures: 3-0 Vicryl
Cheek Interpolation Flap Text: A decision was made to reconstruct the defect utilizing an interpolation axial flap and a staged reconstruction.  A telfa template was made of the defect.  This telfa template was then used to outline the Cheek Interpolation flap.  The donor area for the pedicle flap was then injected with anesthesia.  The flap was excised through the skin and subcutaneous tissue down to the layer of the underlying musculature.  The interpolation flap was carefully excised within this deep plane to maintain its blood supply.  The edges of the donor site were undermined.   The donor site was closed in a primary fashion.  The pedicle was then rotated into position and sutured.  Once the tube was sutured into place, adequate blood supply was confirmed with blanching and refill.  The pedicle was then wrapped with xeroform gauze and dressed appropriately with a telfa and gauze bandage to ensure continued blood supply and protect the attached pedicle.
Second Skin Substitute Units (Will Override Primary Defect Units If Greater Than 0): 0
Perilesional Excision Additional Text (Leave Blank If You Do Not Want): The margin was drawn around the clinically apparent lesion. Incisions were then made along these lines to the appropriate tissue plane and the lesion was extirpated.
O-Z Plasty Text: The defect edges were debeveled with a #15 scalpel blade.  Given the location of the defect, shape of the defect and the proximity to free margins an O-Z plasty (double transposition flap) was deemed most appropriate.  Using a sterile surgical marker, the appropriate transposition flaps were drawn incorporating the defect and placing the expected incisions within the relaxed skin tension lines where possible.    The area thus outlined was incised deep to adipose tissue with a #15 scalpel blade.  The skin margins were undermined to an appropriate distance in all directions utilizing iris scissors.  Hemostasis was achieved with electrocautery.  The flaps were then transposed into place, one clockwise and the other counterclockwise, and anchored with interrupted buried subcutaneous sutures.
Chonodrocutaneous Helical Advancement Flap Text: The defect edges were debeveled with a #15 scalpel blade.  Given the location of the defect and the proximity to free margins a chondrocutaneous helical advancement flap was deemed most appropriate.  Using a sterile surgical marker, the appropriate advancement flap was drawn incorporating the defect and placing the expected incisions within the relaxed skin tension lines where possible.    The area thus outlined was incised deep to adipose tissue with a #15 scalpel blade.  The skin margins were undermined to an appropriate distance in all directions utilizing iris scissors.
Crescentic Complex Repair Preamble Text (Leave Blank If You Do Not Want): Extensive wide undermining was performed.
Complex Repair And Epidermal Autograft Text: The defect edges were debeveled with a #15 scalpel blade.  The primary defect was closed partially with a complex linear closure.  Given the location of the defect, shape of the defect and the proximity to free margins an epidermal autograft was deemed most appropriate to repair the remaining defect.  The graft was trimmed to fit the size of the remaining defect.  The graft was then placed in the primary defect, oriented appropriately, and sutured into place.
Complex Repair And O-L Flap Text: The defect edges were debeveled with a #15 scalpel blade.  The primary defect was closed partially with a complex linear closure.  Given the location of the remaining defect, shape of the defect and the proximity to free margins an O-L flap was deemed most appropriate for complete closure of the defect.  Using a sterile surgical marker, an appropriate flap was drawn incorporating the defect and placing the expected incisions within the relaxed skin tension lines where possible.    The area thus outlined was incised deep to adipose tissue with a #15 scalpel blade.  The skin margins were undermined to an appropriate distance in all directions utilizing iris scissors.
Complex Repair And M Plasty Text: The defect edges were debeveled with a #15 scalpel blade.  The primary defect was closed partially with a complex linear closure.  Given the location of the remaining defect, shape of the defect and the proximity to free margins an M plasty was deemed most appropriate for complete closure of the defect.  Using a sterile surgical marker, an appropriate advancement flap was drawn incorporating the defect and placing the expected incisions within the relaxed skin tension lines where possible.    The area thus outlined was incised deep to adipose tissue with a #15 scalpel blade.  The skin margins were undermined to an appropriate distance in all directions utilizing iris scissors.
Epidermal Autograft Text: The defect edges were debeveled with a #15 scalpel blade.  Given the location of the defect, shape of the defect and the proximity to free margins an epidermal autograft was deemed most appropriate.  Using a sterile surgical marker, the primary defect shape was transferred to the donor site. The epidermal graft was then harvested.  The skin graft was then placed in the primary defect and oriented appropriately.
Partial Purse String (Intermediate) Text: Given the location of the defect and the characteristics of the surrounding skin an intermediate purse string closure was deemed most appropriate.  Undermining was performed circumferentially around the surgical defect.  A purse string suture was then placed and tightened. Wound tension of the circular defect prevented complete closure of the wound.
Posterior Auricular Interpolation Flap Text: A decision was made to reconstruct the defect utilizing an interpolation axial flap and a staged reconstruction.  A telfa template was made of the defect.  This telfa template was then used to outline the posterior auricular interpolation flap.  The donor area for the pedicle flap was then injected with anesthesia.  The flap was excised through the skin and subcutaneous tissue down to the layer of the underlying musculature.  The pedicle flap was carefully excised within this deep plane to maintain its blood supply.  The edges of the donor site were undermined.   The donor site was closed in a primary fashion.  The pedicle was then rotated into position and sutured.  Once the tube was sutured into place, adequate blood supply was confirmed with blanching and refill.  The pedicle was then wrapped with xeroform gauze and dressed appropriately with a telfa and gauze bandage to ensure continued blood supply and protect the attached pedicle.
Helical Rim Advancement Flap Text: The defect edges were debeveled with a #15 blade scalpel.  Given the location of the defect and the proximity to free margins (helical rim) a double helical rim advancement flap was deemed most appropriate.  Using a sterile surgical marker, the appropriate advancement flaps were drawn incorporating the defect and placing the expected incisions between the helical rim and antihelix where possible.  The area thus outlined was incised through and through with a #15 scalpel blade.  With a skin hook and iris scissors, the flaps were gently and sharply undermined and freed up.
Dorsal Nasal Flap Text: The defect edges were debeveled with a #15 scalpel blade.  Given the location of the defect and the proximity to free margins a dorsal nasal flap was deemed most appropriate.  Using a sterile surgical marker, an appropriate dorsal nasal flap was drawn around the defect.    The area thus outlined was incised deep to adipose tissue with a #15 scalpel blade.  The skin margins were undermined to an appropriate distance in all directions utilizing iris scissors.
Advancement-Rotation Flap Text: The defect edges were debeveled with a #15 scalpel blade.  Given the location of the defect, shape of the defect and the proximity to free margins an advancement-rotation flap was deemed most appropriate.  Using a sterile surgical marker, an appropriate flap was drawn incorporating the defect and placing the expected incisions within the relaxed skin tension lines where possible. The area thus outlined was incised deep to adipose tissue with a #15 scalpel blade.  The skin margins were undermined to an appropriate distance in all directions utilizing iris scissors.
Star Wedge Flap Text: The defect edges were debeveled with a #15 scalpel blade.  Given the location of the defect, shape of the defect and the proximity to free margins a star wedge flap was deemed most appropriate.  Using a sterile surgical marker, an appropriate rotation flap was drawn incorporating the defect and placing the expected incisions within the relaxed skin tension lines where possible. The area thus outlined was incised deep to adipose tissue with a #15 scalpel blade.  The skin margins were undermined to an appropriate distance in all directions utilizing iris scissors.
Double O-Z Flap Text: The defect edges were debeveled with a #15 scalpel blade.  Given the location of the defect, shape of the defect and the proximity to free margins a Double O-Z flap was deemed most appropriate.  Using a sterile surgical marker, an appropriate transposition flap was drawn incorporating the defect and placing the expected incisions within the relaxed skin tension lines where possible. The area thus outlined was incised deep to adipose tissue with a #15 scalpel blade.  The skin margins were undermined to an appropriate distance in all directions utilizing iris scissors.
Rotation Flap Text: The defect edges were debeveled with a #15 scalpel blade.  Given the location of the defect, shape of the defect and the proximity to free margins a rotation flap was deemed most appropriate.  Using a sterile surgical marker, an appropriate rotation flap was drawn incorporating the defect and placing the expected incisions within the relaxed skin tension lines where possible.    The area thus outlined was incised deep to adipose tissue with a #15 scalpel blade.  The skin margins were undermined to an appropriate distance in all directions utilizing iris scissors.
Nostril Rim Text: The closure involved the nostril rim.
Suture Removal: 14 days
Dressing: pressure dressing with telfa
Anesthesia Volume In Cc: 4.7
Epidermal Closure: running
Graft Donor Site Bandage (Optional-Leave Blank If You Don't Want In Note): Steri-strips and a pressure bandage were applied to the donor site.
H Plasty Text: Given the location of the defect, shape of the defect and the proximity to free margins a H-plasty was deemed most appropriate for repair.  Using a sterile surgical marker, the appropriate advancement arms of the H-plasty were drawn incorporating the defect and placing the expected incisions within the relaxed skin tension lines where possible. The area thus outlined was incised deep to adipose tissue with a #15 scalpel blade. The skin margins were undermined to an appropriate distance in all directions utilizing iris scissors.  The opposing advancement arms were then advanced into place in opposite direction and anchored with interrupted buried subcutaneous sutures.
Saucerization Excision Additional Text (Leave Blank If You Do Not Want): The margin was drawn around the clinically apparent lesion.  Incisions were then made along these lines, in a tangential fashion, to the appropriate tissue plane and the lesion was extirpated.
Helical Rim Text: The closure involved the helical rim.
Advancement Flap (Single) Text: The defect edges were debeveled with a #15 scalpel blade.  Given the location of the defect and the proximity to free margins a single advancement flap was deemed most appropriate.  Using a sterile surgical marker, an appropriate advancement flap was drawn incorporating the defect and placing the expected incisions within the relaxed skin tension lines where possible.    The area thus outlined was incised deep to adipose tissue with a #15 scalpel blade.  The skin margins were undermined to an appropriate distance in all directions utilizing iris scissors.
Island Pedicle Flap-Requiring Vessel Identification Text: The defect edges were debeveled with a #15 scalpel blade.  Given the location of the defect, shape of the defect and the proximity to free margins an island pedicle advancement flap was deemed most appropriate.  Using a sterile surgical marker, an appropriate advancement flap was drawn, based on the axial vessel mentioned above, incorporating the defect, outlining the appropriate donor tissue and placing the expected incisions within the relaxed skin tension lines where possible.    The area thus outlined was incised deep to adipose tissue with a #15 scalpel blade.  The skin margins were undermined to an appropriate distance in all directions around the primary defect and laterally outward around the island pedicle utilizing iris scissors.  There was minimal undermining beneath the pedicle flap.
Pre-Excision Curettage Text (Leave Blank If You Do Not Want): Prior to drawing the surgical margin the visible lesion was removed with electrodesiccation and curettage to clearly define the lesion size.
Complex Repair And Dorsal Nasal Flap Text: The defect edges were debeveled with a #15 scalpel blade.  The primary defect was closed partially with a complex linear closure.  Given the location of the remaining defect, shape of the defect and the proximity to free margins a dorsal nasal flap was deemed most appropriate for complete closure of the defect.  Using a sterile surgical marker, an appropriate flap was drawn incorporating the defect and placing the expected incisions within the relaxed skin tension lines where possible.    The area thus outlined was incised deep to adipose tissue with a #15 scalpel blade.  The skin margins were undermined to an appropriate distance in all directions utilizing iris scissors.
Complex Repair And Modified Advancement Flap Text: The defect edges were debeveled with a #15 scalpel blade.  The primary defect was closed partially with a complex linear closure.  Given the location of the remaining defect, shape of the defect and the proximity to free margins a modified advancement flap was deemed most appropriate for complete closure of the defect.  Using a sterile surgical marker, an appropriate advancement flap was drawn incorporating the defect and placing the expected incisions within the relaxed skin tension lines where possible.    The area thus outlined was incised deep to adipose tissue with a #15 scalpel blade.  The skin margins were undermined to an appropriate distance in all directions utilizing iris scissors.
Anesthesia Volume In Cc: 6
Complex Repair And Rhombic Flap Text: The defect edges were debeveled with a #15 scalpel blade.  The primary defect was closed partially with a complex linear closure.  Given the location of the remaining defect, shape of the defect and the proximity to free margins a rhombic flap was deemed most appropriate for complete closure of the defect.  Using a sterile surgical marker, an appropriate advancement flap was drawn incorporating the defect and placing the expected incisions within the relaxed skin tension lines where possible.    The area thus outlined was incised deep to adipose tissue with a #15 scalpel blade.  The skin margins were undermined to an appropriate distance in all directions utilizing iris scissors.
Estimated Blood Loss (Cc): minimal
Split-Thickness Skin Graft Text: The defect edges were debeveled with a #15 scalpel blade.  Given the location of the defect, shape of the defect and the proximity to free margins a split thickness skin graft was deemed most appropriate.  Using a sterile surgical marker, the primary defect shape was transferred to the donor site. The split thickness graft was then harvested.  The skin graft was then placed in the primary defect and oriented appropriately.
Xenograft Text: The defect edges were debeveled with a #15 scalpel blade.  Given the location of the defect, shape of the defect and the proximity to free margins a xenograft was deemed most appropriate.  The graft was then trimmed to fit the size of the defect.  The graft was then placed in the primary defect and oriented appropriately.
Excision Depth: adipose tissue
Billing Type: Third-Party Bill
Keystone Flap Text: The defect edges were debeveled with a #15 scalpel blade.  Given the location of the defect, shape of the defect a keystone flap was deemed most appropriate.  Using a sterile surgical marker, an appropriate keystone flap was drawn incorporating the defect, outlining the appropriate donor tissue and placing the expected incisions within the relaxed skin tension lines where possible. The area thus outlined was incised deep to adipose tissue with a #15 scalpel blade.  The skin margins were undermined to an appropriate distance in all directions around the primary defect and laterally outward around the flap utilizing iris scissors.
Advancement Flap (Double) Text: The defect edges were debeveled with a #15 scalpel blade.  Given the location of the defect and the proximity to free margins a double advancement flap was deemed most appropriate.  Using a sterile surgical marker, the appropriate advancement flaps were drawn incorporating the defect and placing the expected incisions within the relaxed skin tension lines where possible.    The area thus outlined was incised deep to adipose tissue with a #15 scalpel blade.  The skin margins were undermined to an appropriate distance in all directions utilizing iris scissors.
W Plasty Text: The lesion was extirpated to the level of the fat with a #15 scalpel blade.  Given the location of the defect, shape of the defect and the proximity to free margins a W-plasty was deemed most appropriate for repair.  Using a sterile surgical marker, the appropriate transposition arms of the W-plasty were drawn incorporating the defect and placing the expected incisions within the relaxed skin tension lines where possible.    The area thus outlined was incised deep to adipose tissue with a #15 scalpel blade.  The skin margins were undermined to an appropriate distance in all directions utilizing iris scissors.  The opposing transposition arms were then transposed into place in opposite direction and anchored with interrupted buried subcutaneous sutures.
Slit Excision Additional Text (Leave Blank If You Do Not Want): A linear line was drawn on the skin overlying the lesion. An incision was made slowly until the lesion was visualized.  Once visualized, the lesion was removed with blunt dissection.
Vermilion Border Text: The closure involved the vermilion border.
Complex Repair And Transposition Flap Text: The defect edges were debeveled with a #15 scalpel blade.  The primary defect was closed partially with a complex linear closure.  Given the location of the remaining defect, shape of the defect and the proximity to free margins a transposition flap was deemed most appropriate for complete closure of the defect.  Using a sterile surgical marker, an appropriate advancement flap was drawn incorporating the defect and placing the expected incisions within the relaxed skin tension lines where possible.    The area thus outlined was incised deep to adipose tissue with a #15 scalpel blade.  The skin margins were undermined to an appropriate distance in all directions utilizing iris scissors.
Complex Repair And Ftsg Text: The defect edges were debeveled with a #15 scalpel blade.  The primary defect was closed partially with a complex linear closure.  Given the location of the defect, shape of the defect and the proximity to free margins a full thickness skin graft was deemed most appropriate to repair the remaining defect.  The graft was trimmed to fit the size of the remaining defect.  The graft was then placed in the primary defect, oriented appropriately, and sutured into place.
Purse String (Intermediate) Text: Given the location of the defect and the characteristics of the surrounding skin a pursestring intermediate closure was deemed most appropriate.  Undermining was performed circumfirentially around the surgical defect.  A purstring suture was then placed and tightened.
Complex Repair And A-T Advancement Flap Text: The defect edges were debeveled with a #15 scalpel blade.  The primary defect was closed partially with a complex linear closure.  Given the location of the remaining defect, shape of the defect and the proximity to free margins an A-T advancement flap was deemed most appropriate for complete closure of the defect.  Using a sterile surgical marker, an appropriate advancement flap was drawn incorporating the defect and placing the expected incisions within the relaxed skin tension lines where possible.    The area thus outlined was incised deep to adipose tissue with a #15 scalpel blade.  The skin margins were undermined to an appropriate distance in all directions utilizing iris scissors.
Cartilage Graft Text: The defect edges were debeveled with a #15 scalpel blade.  Given the location of the defect, shape of the defect, the fact the defect involved a full thickness cartilage defect a cartilage graft was deemed most appropriate.  An appropriate donor site was identified, cleansed, and anesthetized. The cartilage graft was then harvested and transferred to the recipient site, oriented appropriately and then sutured into place.  The secondary defect was then repaired using a primary closure.
Melolabial Interpolation Flap Text: A decision was made to reconstruct the defect utilizing an interpolation axial flap and a staged reconstruction.  A telfa template was made of the defect.  This telfa template was then used to outline the melolabial interpolation flap.  The donor area for the pedicle flap was then injected with anesthesia.  The flap was excised through the skin and subcutaneous tissue down to the layer of the underlying musculature.  The pedicle flap was carefully excised within this deep plane to maintain its blood supply.  The edges of the donor site were undermined.   The donor site was closed in a primary fashion.  The pedicle was then rotated into position and sutured.  Once the tube was sutured into place, adequate blood supply was confirmed with blanching and refill.  The pedicle was then wrapped with xeroform gauze and dressed appropriately with a telfa and gauze bandage to ensure continued blood supply and protect the attached pedicle.
Rhomboid Transposition Flap Text: The defect edges were debeveled with a #15 scalpel blade.  Given the location of the defect and the proximity to free margins a rhomboid transposition flap was deemed most appropriate.  Using a sterile surgical marker, an appropriate rhomboid flap was drawn incorporating the defect.    The area thus outlined was incised deep to adipose tissue with a #15 scalpel blade.  The skin margins were undermined to an appropriate distance in all directions utilizing iris scissors.
Bilobed Transposition Flap Text: The defect edges were debeveled with a #15 scalpel blade.  Given the location of the defect and the proximity to free margins a bilobed transposition flap was deemed most appropriate.  Using a sterile surgical marker, an appropriate bilobe flap drawn around the defect.    The area thus outlined was incised deep to adipose tissue with a #15 scalpel blade.  The skin margins were undermined to an appropriate distance in all directions utilizing iris scissors.
Complex Repair And Rotation Flap Text: The defect edges were debeveled with a #15 scalpel blade.  The primary defect was closed partially with a complex linear closure.  Given the location of the remaining defect, shape of the defect and the proximity to free margins a rotation flap was deemed most appropriate for complete closure of the defect.  Using a sterile surgical marker, an appropriate advancement flap was drawn incorporating the defect and placing the expected incisions within the relaxed skin tension lines where possible.    The area thus outlined was incised deep to adipose tissue with a #15 scalpel blade.  The skin margins were undermined to an appropriate distance in all directions utilizing iris scissors.
Complex Repair And Z Plasty Text: The defect edges were debeveled with a #15 scalpel blade.  The primary defect was closed partially with a complex linear closure.  Given the location of the remaining defect, shape of the defect and the proximity to free margins a Z plasty was deemed most appropriate for complete closure of the defect.  Using a sterile surgical marker, an appropriate advancement flap was drawn incorporating the defect and placing the expected incisions within the relaxed skin tension lines where possible.    The area thus outlined was incised deep to adipose tissue with a #15 scalpel blade.  The skin margins were undermined to an appropriate distance in all directions utilizing iris scissors.
Tissue Cultured Epidermal Autograft Text: The defect edges were debeveled with a #15 scalpel blade.  Given the location of the defect, shape of the defect and the proximity to free margins a tissue cultured epidermal autograft was deemed most appropriate.  The graft was then trimmed to fit the size of the defect.  The graft was then placed in the primary defect and oriented appropriately.
Complex Repair And Double Advancement Flap Text: The defect edges were debeveled with a #15 scalpel blade.  The primary defect was closed partially with a complex linear closure.  Given the location of the remaining defect, shape of the defect and the proximity to free margins a double advancement flap was deemed most appropriate for complete closure of the defect.  Using a sterile surgical marker, an appropriate advancement flap was drawn incorporating the defect and placing the expected incisions within the relaxed skin tension lines where possible.    The area thus outlined was incised deep to adipose tissue with a #15 scalpel blade.  The skin margins were undermined to an appropriate distance in all directions utilizing iris scissors.
Ftsg Text: The defect edges were debeveled with a #15 scalpel blade.  Given the location of the defect, shape of the defect and the proximity to free margins a full thickness skin graft was deemed most appropriate.  Using a sterile surgical marker, the primary defect shape was transferred to the donor site. The area thus outlined was incised deep to adipose tissue with a #15 scalpel blade.  The harvested graft was then trimmed of adipose tissue until only dermis and epidermis was left.  The skin margins of the secondary defect were undermined to an appropriate distance in all directions utilizing iris scissors.  The secondary defect was closed with interrupted buried subcutaneous sutures.  The skin edges were then re-apposed with running  sutures.  The skin graft was then placed in the primary defect and oriented appropriately.
Banner Transposition Flap Text: The defect edges were debeveled with a #15 scalpel blade.  Given the location of the defect and the proximity to free margins a Banner transposition flap was deemed most appropriate.  Using a sterile surgical marker, an appropriate flap drawn around the defect. The area thus outlined was incised deep to adipose tissue with a #15 scalpel blade.  The skin margins were undermined to an appropriate distance in all directions utilizing iris scissors.
Alar Island Pedicle Flap Text: The defect edges were debeveled with a #15 scalpel blade.  Given the location of the defect, shape of the defect and the proximity to the alar rim an island pedicle advancement flap was deemed most appropriate.  Using a sterile surgical marker, an appropriate advancement flap was drawn incorporating the defect, outlining the appropriate donor tissue and placing the expected incisions within the nasal ala running parallel to the alar rim. The area thus outlined was incised with a #15 scalpel blade.  The skin margins were undermined minimally to an appropriate distance in all directions around the primary defect and laterally outward around the island pedicle utilizing iris scissors.  There was minimal undermining beneath the pedicle flap.
Mucosal Advancement Flap Text: Given the location of the defect, shape of the defect and the proximity to free margins a mucosal advancement flap was deemed most appropriate. Incisions were made with a 15 blade scalpel in the appropriate fashion along the cutaneous vermillion border and the mucosal lip. The remaining actinically damaged mucosal tissue was excised.  The mucosal advancement flap was then elevated to the gingival sulcus with care taken to preserve the neurovascular structures and advanced into the primary defect. Care was taken to ensure that precise realignment of the vermillion border was achieved.
Melolabial Transposition Flap Text: The defect edges were debeveled with a #15 scalpel blade.  Given the location of the defect and the proximity to free margins a melolabial flap was deemed most appropriate.  Using a sterile surgical marker, an appropriate melolabial transposition flap was drawn incorporating the defect.    The area thus outlined was incised deep to adipose tissue with a #15 scalpel blade.  The skin margins were undermined to an appropriate distance in all directions utilizing iris scissors.
Post-Care Instructions: I reviewed with the patient in detail post-care instructions. Patient is not to engage in any heavy lifting, exercise, or swimming for the next 14 days. Should the patient develop any fevers, chills, bleeding, severe pain patient will contact the office immediately.
O-L Flap Text: The defect edges were debeveled with a #15 scalpel blade.  Given the location of the defect, shape of the defect and the proximity to free margins an O-L flap was deemed most appropriate.  Using a sterile surgical marker, an appropriate advancement flap was drawn incorporating the defect and placing the expected incisions within the relaxed skin tension lines where possible.    The area thus outlined was incised deep to adipose tissue with a #15 scalpel blade.  The skin margins were undermined to an appropriate distance in all directions utilizing iris scissors.
Epidermal Sutures: 4-0 Prolene
Complex Repair And Skin Substitute Graft Text: The defect edges were debeveled with a #15 scalpel blade.  The primary defect was closed partially with a complex linear closure.  Given the location of the remaining defect, shape of the defect and the proximity to free margins a skin substitute graft was deemed most appropriate to repair the remaining defect.  The graft was trimmed to fit the size of the remaining defect.  The graft was then placed in the primary defect, oriented appropriately, and sutured into place.
A-T Advancement Flap Text: The defect edges were debeveled with a #15 scalpel blade.  Given the location of the defect, shape of the defect and the proximity to free margins an A-T advancement flap was deemed most appropriate.  Using a sterile surgical marker, an appropriate advancement flap was drawn incorporating the defect and placing the expected incisions within the relaxed skin tension lines where possible.    The area thus outlined was incised deep to adipose tissue with a #15 scalpel blade.  The skin margins were undermined to an appropriate distance in all directions utilizing iris scissors.
S Plasty Text: Given the location and shape of the defect, and the orientation of relaxed skin tension lines, an S-plasty was deemed most appropriate for repair.  Using a sterile surgical marker, the appropriate outline of the S-plasty was drawn, incorporating the defect and placing the expected incisions within the relaxed skin tension lines where possible.  The area thus outlined was incised deep to adipose tissue with a #15 scalpel blade.  The skin margins were undermined to an appropriate distance in all directions utilizing iris scissors. The skin flaps were advanced over the defect.  The opposing margins were then approximated with interrupted buried subcutaneous sutures.
Interpolation Flap Text: A decision was made to reconstruct the defect utilizing an interpolation axial flap and a staged reconstruction.  A telfa template was made of the defect.  This telfa template was then used to outline the interpolation flap.  The donor area for the pedicle flap was then injected with anesthesia.  The flap was excised through the skin and subcutaneous tissue down to the layer of the underlying musculature.  The interpolation flap was carefully excised within this deep plane to maintain its blood supply.  The edges of the donor site were undermined.   The donor site was closed in a primary fashion.  The pedicle was then rotated into position and sutured.  Once the tube was sutured into place, adequate blood supply was confirmed with blanching and refill.  The pedicle was then wrapped with xeroform gauze and dressed appropriately with a telfa and gauze bandage to ensure continued blood supply and protect the attached pedicle.
Bilobed Flap Text: The defect edges were debeveled with a #15 scalpel blade.  Given the location of the defect and the proximity to free margins a bilobe flap was deemed most appropriate.  Using a sterile surgical marker, an appropriate bilobe flap drawn around the defect.    The area thus outlined was incised deep to adipose tissue with a #15 scalpel blade.  The skin margins were undermined to an appropriate distance in all directions utilizing iris scissors.
Double Island Pedicle Flap Text: The defect edges were debeveled with a #15 scalpel blade.  Given the location of the defect, shape of the defect and the proximity to free margins a double island pedicle advancement flap was deemed most appropriate.  Using a sterile surgical marker, an appropriate advancement flap was drawn incorporating the defect, outlining the appropriate donor tissue and placing the expected incisions within the relaxed skin tension lines where possible.    The area thus outlined was incised deep to adipose tissue with a #15 scalpel blade.  The skin margins were undermined to an appropriate distance in all directions around the primary defect and laterally outward around the island pedicle utilizing iris scissors.  There was minimal undermining beneath the pedicle flap.
O-Z Flap Text: The defect edges were debeveled with a #15 scalpel blade.  Given the location of the defect, shape of the defect and the proximity to free margins an O-Z flap was deemed most appropriate.  Using a sterile surgical marker, an appropriate transposition flap was drawn incorporating the defect and placing the expected incisions within the relaxed skin tension lines where possible. The area thus outlined was incised deep to adipose tissue with a #15 scalpel blade.  The skin margins were undermined to an appropriate distance in all directions utilizing iris scissors.
Hatchet Flap Text: The defect edges were debeveled with a #15 scalpel blade.  Given the location of the defect, shape of the defect and the proximity to free margins a hatchet flap was deemed most appropriate.  Using a sterile surgical marker, an appropriate hatchet flap was drawn incorporating the defect and placing the expected incisions within the relaxed skin tension lines where possible.    The area thus outlined was incised deep to adipose tissue with a #15 scalpel blade.  The skin margins were undermined to an appropriate distance in all directions utilizing iris scissors.
Rhombic Flap Text: The defect edges were debeveled with a #15 scalpel blade.  Given the location of the defect and the proximity to free margins a rhombic flap was deemed most appropriate.  Using a sterile surgical marker, an appropriate rhombic flap was drawn incorporating the defect.    The area thus outlined was incised deep to adipose tissue with a #15 scalpel blade.  The skin margins were undermined to an appropriate distance in all directions utilizing iris scissors.
Undermining Type: Entire Wound
Home Suture Removal Text: Patient was provided a home suture removal kit and will remove their sutures at home.  If they have any questions or difficulties they will call the office.
Path Notes (To The Dermatopathologist): Please check margins.
No Repair - Repaired With Adjacent Surgical Defect Text (Leave Blank If You Do Not Want): After the excision the defect was repaired concurrently with another surgical defect which was in close approximation.
V-Y Plasty Text: The defect edges were debeveled with a #15 scalpel blade.  Given the location of the defect, shape of the defect and the proximity to free margins an V-Y advancement flap was deemed most appropriate.  Using a sterile surgical marker, an appropriate advancement flap was drawn incorporating the defect and placing the expected incisions within the relaxed skin tension lines where possible.    The area thus outlined was incised deep to adipose tissue with a #15 scalpel blade.  The skin margins were undermined to an appropriate distance in all directions utilizing iris scissors.
Positioning (Leave Blank If You Do Not Want): The patient was placed in a comfortable position exposing the surgical site.
Intermediate / Complex Repair - Final Wound Length In Cm: 4
Elliptical Excision Additional Text (Leave Blank If You Do Not Want): The margin was drawn around the clinically apparent lesion.  An elliptical shape was then drawn on the skin incorporating the lesion and margins.  Incisions were then made along these lines to the appropriate tissue plane and the lesion was extirpated.
Complex Repair And Xenograft Text: The defect edges were debeveled with a #15 scalpel blade.  The primary defect was closed partially with a complex linear closure.  Given the location of the defect, shape of the defect and the proximity to free margins a xenograft was deemed most appropriate to repair the remaining defect.  The graft was trimmed to fit the size of the remaining defect.  The graft was then placed in the primary defect, oriented appropriately, and sutured into place.
Double O-Z Plasty Text: The defect edges were debeveled with a #15 scalpel blade.  Given the location of the defect, shape of the defect and the proximity to free margins a Double O-Z plasty (double transposition flap) was deemed most appropriate.  Using a sterile surgical marker, the appropriate transposition flaps were drawn incorporating the defect and placing the expected incisions within the relaxed skin tension lines where possible. The area thus outlined was incised deep to adipose tissue with a #15 scalpel blade.  The skin margins were undermined to an appropriate distance in all directions utilizing iris scissors.  Hemostasis was achieved with electrocautery.  The flaps were then transposed into place, one clockwise and the other counterclockwise, and anchored with interrupted buried subcutaneous sutures.
Crescentic Advancement Flap Text: The defect edges were debeveled with a #15 scalpel blade.  Given the location of the defect and the proximity to free margins a crescentic advancement flap was deemed most appropriate.  Using a sterile surgical marker, the appropriate advancement flap was drawn incorporating the defect and placing the expected incisions within the relaxed skin tension lines where possible.    The area thus outlined was incised deep to adipose tissue with a #15 scalpel blade.  The skin margins were undermined to an appropriate distance in all directions utilizing iris scissors.
Cheek-To-Nose Interpolation Flap Text: A decision was made to reconstruct the defect utilizing an interpolation axial flap and a staged reconstruction.  A telfa template was made of the defect.  This telfa template was then used to outline the Cheek-To-Nose Interpolation flap.  The donor area for the pedicle flap was then injected with anesthesia.  The flap was excised through the skin and subcutaneous tissue down to the layer of the underlying musculature.  The interpolation flap was carefully excised within this deep plane to maintain its blood supply.  The edges of the donor site were undermined.   The donor site was closed in a primary fashion.  The pedicle was then rotated into position and sutured.  Once the tube was sutured into place, adequate blood supply was confirmed with blanching and refill.  The pedicle was then wrapped with xeroform gauze and dressed appropriately with a telfa and gauze bandage to ensure continued blood supply and protect the attached pedicle.
Curvilinear Excision Additional Text (Leave Blank If You Do Not Want): The margin was drawn around the clinically apparent lesion.  A curvilinear shape was then drawn on the skin incorporating the lesion and margins.  Incisions were then made along these lines to the appropriate tissue plane and the lesion was extirpated.
Where Do You Want The Question To Include Opioid Counseling Located?: Case Summary Tab
Size Of Margin In Cm: 0.5
Complex Repair And Double M Plasty Text: The defect edges were debeveled with a #15 scalpel blade.  The primary defect was closed partially with a complex linear closure.  Given the location of the remaining defect, shape of the defect and the proximity to free margins a double M plasty was deemed most appropriate for complete closure of the defect.  Using a sterile surgical marker, an appropriate advancement flap was drawn incorporating the defect and placing the expected incisions within the relaxed skin tension lines where possible.    The area thus outlined was incised deep to adipose tissue with a #15 scalpel blade.  The skin margins were undermined to an appropriate distance in all directions utilizing iris scissors.
Retention Suture Text: Retention sutures were placed to support the closure and prevent dehiscence.
Complex Repair And Dermal Autograft Text: The defect edges were debeveled with a #15 scalpel blade.  The primary defect was closed partially with a complex linear closure.  Given the location of the defect, shape of the defect and the proximity to free margins an dermal autograft was deemed most appropriate to repair the remaining defect.  The graft was trimmed to fit the size of the remaining defect.  The graft was then placed in the primary defect, oriented appropriately, and sutured into place.
Partial Purse String (Simple) Text: Given the location of the defect and the characteristics of the surrounding skin a simple purse string closure was deemed most appropriate.  Undermining was performed circumferentially around the surgical defect.  A purse string suture was then placed and tightened. Wound tension of the circular defect prevented complete closure of the wound.
Complex Repair And Bilobe Flap Text: The defect edges were debeveled with a #15 scalpel blade.  The primary defect was closed partially with a complex linear closure.  Given the location of the remaining defect, shape of the defect and the proximity to free margins a bilobe flap was deemed most appropriate for complete closure of the defect.  Using a sterile surgical marker, an appropriate advancement flap was drawn incorporating the defect and placing the expected incisions within the relaxed skin tension lines where possible.    The area thus outlined was incised deep to adipose tissue with a #15 scalpel blade.  The skin margins were undermined to an appropriate distance in all directions utilizing iris scissors.
Paramedian Forehead Flap Text: A decision was made to reconstruct the defect utilizing an interpolation axial flap and a staged reconstruction.  A telfa template was made of the defect.  This telfa template was then used to outline the paramedian forehead pedicle flap.  The donor area for the pedicle flap was then injected with anesthesia.  The flap was excised through the skin and subcutaneous tissue down to the layer of the underlying musculature.  The pedicle flap was carefully excised within this deep plane to maintain its blood supply.  The edges of the donor site were undermined.   The donor site was closed in a primary fashion.  The pedicle was then rotated into position and sutured.  Once the tube was sutured into place, adequate blood supply was confirmed with blanching and refill.  The pedicle was then wrapped with xeroform gauze and dressed appropriately with a telfa and gauze bandage to ensure continued blood supply and protect the attached pedicle.
Dermal Autograft Text: The defect edges were debeveled with a #15 scalpel blade.  Given the location of the defect, shape of the defect and the proximity to free margins a dermal autograft was deemed most appropriate.  Using a sterile surgical marker, the primary defect shape was transferred to the donor site. The area thus outlined was incised deep to adipose tissue with a #15 scalpel blade.  The harvested graft was then trimmed of adipose and epidermal tissue until only dermis was left.  The skin graft was then placed in the primary defect and oriented appropriately.
Bilateral Helical Rim Advancement Flap Text: The defect edges were debeveled with a #15 blade scalpel.  Given the location of the defect and the proximity to free margins (helical rim) a bilateral helical rim advancement flap was deemed most appropriate.  Using a sterile surgical marker, the appropriate advancement flaps were drawn incorporating the defect and placing the expected incisions between the helical rim and antihelix where possible.  The area thus outlined was incised through and through with a #15 scalpel blade.  With a skin hook and iris scissors, the flaps were gently and sharply undermined and freed up.
Debridement Text: The wound edges were debrided prior to proceeding with the closure to facilitate wound healing.
Island Pedicle Flap Text: The defect edges were debeveled with a #15 scalpel blade.  Given the location of the defect, shape of the defect and the proximity to free margins an island pedicle advancement flap was deemed most appropriate.  Using a sterile surgical marker, an appropriate advancement flap was drawn incorporating the defect, outlining the appropriate donor tissue and placing the expected incisions within the relaxed skin tension lines where possible.    The area thus outlined was incised deep to adipose tissue with a #15 scalpel blade.  The skin margins were undermined to an appropriate distance in all directions around the primary defect and laterally outward around the island pedicle utilizing iris scissors.  There was minimal undermining beneath the pedicle flap.
Mercedes Flap Text: The defect edges were debeveled with a #15 scalpel blade.  Given the location of the defect, shape of the defect and the proximity to free margins a Mercedes flap was deemed most appropriate.  Using a sterile surgical marker, an appropriate advancement flap was drawn incorporating the defect and placing the expected incisions within the relaxed skin tension lines where possible. The area thus outlined was incised deep to adipose tissue with a #15 scalpel blade.  The skin margins were undermined to an appropriate distance in all directions utilizing iris scissors.
Repair Type: Complex
Transposition Flap Text: The defect edges were debeveled with a #15 scalpel blade.  Given the location of the defect and the proximity to free margins a transposition flap was deemed most appropriate.  Using a sterile surgical marker, an appropriate transposition flap was drawn incorporating the defect.    The area thus outlined was incised deep to adipose tissue with a #15 scalpel blade.  The skin margins were undermined to an appropriate distance in all directions utilizing iris scissors.
Scalpel Size: 15 blade
Epidermal Closure Graft Donor Site (Optional): simple interrupted
Anesthesia Type: 1% lidocaine with epinephrine
Fusiform Excision Additional Text (Leave Blank If You Do Not Want): The margin was drawn around the clinically apparent lesion.  A fusiform shape was then drawn on the skin incorporating the lesion and margins.  Incisions were then made along these lines to the appropriate tissue plane and the lesion was extirpated.
Repair Performed By Another Provider Text (Leave Blank If You Do Not Want): After the tissue was excised the defect was repaired by another provider.
Complex Repair And Tissue Cultured Epidermal Autograft Text: The defect edges were debeveled with a #15 scalpel blade.  The primary defect was closed partially with a complex linear closure.  Given the location of the defect, shape of the defect and the proximity to free margins an tissue cultured epidermal autograft was deemed most appropriate to repair the remaining defect.  The graft was trimmed to fit the size of the remaining defect.  The graft was then placed in the primary defect, oriented appropriately, and sutured into place.
Wound Care: Mupirocin
Excision Method: Elliptical
Information: Selecting Yes will display possible errors in your note based on the variables you have selected. This validation is only offered as a suggestion for you. PLEASE NOTE THAT THE VALIDATION TEXT WILL BE REMOVED WHEN YOU FINALIZE YOUR NOTE. IF YOU WANT TO FAX A PRELIMINARY NOTE YOU WILL NEED TO TOGGLE THIS TO 'NO' IF YOU DO NOT WANT IT IN YOUR FAXED NOTE.
Complex Repair And Melolabial Flap Text: The defect edges were debeveled with a #15 scalpel blade.  The primary defect was closed partially with a complex linear closure.  Given the location of the remaining defect, shape of the defect and the proximity to free margins a melolabial flap was deemed most appropriate for complete closure of the defect.  Using a sterile surgical marker, an appropriate advancement flap was drawn incorporating the defect and placing the expected incisions within the relaxed skin tension lines where possible.    The area thus outlined was incised deep to adipose tissue with a #15 scalpel blade.  The skin margins were undermined to an appropriate distance in all directions utilizing iris scissors.
Complex Repair And W Plasty Text: The defect edges were debeveled with a #15 scalpel blade.  The primary defect was closed partially with a complex linear closure.  Given the location of the remaining defect, shape of the defect and the proximity to free margins a W plasty was deemed most appropriate for complete closure of the defect.  Using a sterile surgical marker, an appropriate advancement flap was drawn incorporating the defect and placing the expected incisions within the relaxed skin tension lines where possible.    The area thus outlined was incised deep to adipose tissue with a #15 scalpel blade.  The skin margins were undermined to an appropriate distance in all directions utilizing iris scissors.
Skin Substitute Text: The defect edges were debeveled with a #15 scalpel blade.  Given the location of the defect, shape of the defect and the proximity to free margins a skin substitute graft was deemed most appropriate.  The graft material was trimmed to fit the size of the defect. The graft was then placed in the primary defect and oriented appropriately.
Complex Repair And Single Advancement Flap Text: The defect edges were debeveled with a #15 scalpel blade.  The primary defect was closed partially with a complex linear closure.  Given the location of the remaining defect, shape of the defect and the proximity to free margins a single advancement flap was deemed most appropriate for complete closure of the defect.  Using a sterile surgical marker, an appropriate advancement flap was drawn incorporating the defect and placing the expected incisions within the relaxed skin tension lines where possible.    The area thus outlined was incised deep to adipose tissue with a #15 scalpel blade.  The skin margins were undermined to an appropriate distance in all directions utilizing iris scissors.
Lip Wedge Excision Repair Text: Given the location of the defect and the proximity to free margins a full thickness wedge repair was deemed most appropriate.  Using a sterile surgical marker, the appropriate repair was drawn incorporating the defect and placing the expected incisions perpendicular to the vermillion border.  The vermillion border was also meticulously outlined to ensure appropriate reapproximation during the repair.  The area thus outlined was incised through and through with a #15 scalpel blade.  The muscularis and dermis were reaproximated with deep sutures following hemostasis. Care was taken to realign the vermillion border before proceeding with the superficial closure.  Once the vermillion was realigned the superfical and mucosal closure was finished.
Island Pedicle Flap With Canthal Suspension Text: The defect edges were debeveled with a #15 scalpel blade.  Given the location of the defect, shape of the defect and the proximity to free margins an island pedicle advancement flap was deemed most appropriate.  Using a sterile surgical marker, an appropriate advancement flap was drawn incorporating the defect, outlining the appropriate donor tissue and placing the expected incisions within the relaxed skin tension lines where possible. The area thus outlined was incised deep to adipose tissue with a #15 scalpel blade.  The skin margins were undermined to an appropriate distance in all directions around the primary defect and laterally outward around the island pedicle utilizing iris scissors.  There was minimal undermining beneath the pedicle flap. A suspension suture was placed in the canthal tendon to prevent tension and prevent ectropion.
Muscle Hinge Flap Text: The defect edges were debeveled with a #15 scalpel blade.  Given the size, depth and location of the defect and the proximity to free margins a muscle hinge flap was deemed most appropriate.  Using a sterile surgical marker, an appropriate hinge flap was drawn incorporating the defect. The area thus outlined was incised with a #15 scalpel blade.  The skin margins were undermined to an appropriate distance in all directions utilizing iris scissors.
Ear Star Wedge Flap Text: The defect edges were debeveled with a #15 blade scalpel.  Given the location of the defect and the proximity to free margins (helical rim) an ear star wedge flap was deemed most appropriate.  Using a sterile surgical marker, the appropriate flap was drawn incorporating the defect and placing the expected incisions between the helical rim and antihelix where possible.  The area thus outlined was incised through and through with a #15 scalpel blade.
O-T Advancement Flap Text: The defect edges were debeveled with a #15 scalpel blade.  Given the location of the defect, shape of the defect and the proximity to free margins an O-T advancement flap was deemed most appropriate.  Using a sterile surgical marker, an appropriate advancement flap was drawn incorporating the defect and placing the expected incisions within the relaxed skin tension lines where possible.    The area thus outlined was incised deep to adipose tissue with a #15 scalpel blade.  The skin margins were undermined to an appropriate distance in all directions utilizing iris scissors.
Modified Advancement Flap Text: The defect edges were debeveled with a #15 scalpel blade.  Given the location of the defect, shape of the defect and the proximity to free margins a modified advancement flap was deemed most appropriate.  Using a sterile surgical marker, an appropriate advancement flap was drawn incorporating the defect and placing the expected incisions within the relaxed skin tension lines where possible.    The area thus outlined was incised deep to adipose tissue with a #15 scalpel blade.  The skin margins were undermined to an appropriate distance in all directions utilizing iris scissors.
Hemostasis: Electrocautery
Detail Level: Detailed

## 2020-03-10 ENCOUNTER — APPOINTMENT (OUTPATIENT)
Age: 70
Setting detail: DERMATOLOGY
End: 2020-03-10

## 2020-03-10 DIAGNOSIS — Z48.02 ENCOUNTER FOR REMOVAL OF SUTURES: ICD-10-CM

## 2020-03-10 PROBLEM — E05.90 THYROTOXICOSIS, UNSPECIFIED WITHOUT THYROTOXIC CRISIS OR STORM: Status: ACTIVE | Noted: 2020-03-10

## 2020-03-10 PROCEDURE — 99024 POSTOP FOLLOW-UP VISIT: CPT

## 2020-03-10 PROCEDURE — OTHER MIPS QUALITY: OTHER

## 2020-03-10 PROCEDURE — OTHER COUNSELING: OTHER

## 2020-03-10 PROCEDURE — OTHER SUTURE REMOVAL (GLOBAL PERIOD): OTHER

## 2020-03-10 ASSESSMENT — LOCATION ZONE DERM: LOCATION ZONE: TRUNK

## 2020-03-10 ASSESSMENT — LOCATION DETAILED DESCRIPTION DERM: LOCATION DETAILED: RIGHT MEDIAL SUPERIOR CHEST

## 2020-03-10 ASSESSMENT — LOCATION SIMPLE DESCRIPTION DERM: LOCATION SIMPLE: CHEST

## 2020-03-10 NOTE — PROCEDURE: SUTURE REMOVAL (GLOBAL PERIOD)
Add 63454 Cpt? (Important Note: In 2017 The Use Of 63580 Is Being Tracked By Cms To Determine Future Global Period Reimbursement For Global Periods): yes
Detail Level: Detailed

## 2022-10-21 NOTE — PROCEDURE: FOLLOW UP FOR NEXT VISIT
Called at 3:30pm, patient asked that I call back in 30 min.  Called back at 4p, but Steve was still unable to talk due to an urgent issue.    Diabetes Medication(s)     Insulin       insulin detemir (LEVEMIR PEN) 100 UNIT/ML pen    Give 42 units of insulin at bedtime.  Max dose 44 units.        Reports she is currently taking 50 units of Levemir daily.  Highest BG reported as 182 mg/dL.    Recommended rescheduling for next week and again suggested we think about starting meal time insulin.  Steve states she does not want to start meal time insulin as taking insulin 4 times per day is too much.  Rescheduled visit for Tuesday 10/25.   Will reach out to provider to update.    Jenny Lizama RDN, LD  Outpatient Diabetes Education  Adult Diabetes Education Triage [phone]    
Detail Level: Simple
Instructions (Optional): SRT
